# Patient Record
Sex: FEMALE | Race: WHITE | NOT HISPANIC OR LATINO | Employment: OTHER | URBAN - NONMETROPOLITAN AREA
[De-identification: names, ages, dates, MRNs, and addresses within clinical notes are randomized per-mention and may not be internally consistent; named-entity substitution may affect disease eponyms.]

---

## 2017-03-29 ENCOUNTER — AMBULATORY - GICH (OUTPATIENT)
Dept: FAMILY MEDICINE | Facility: OTHER | Age: 72
End: 2017-03-29

## 2017-03-29 ENCOUNTER — AMBULATORY - GICH (OUTPATIENT)
Dept: LAB | Facility: OTHER | Age: 72
End: 2017-03-29

## 2017-03-29 DIAGNOSIS — I48.91 ATRIAL FIBRILLATION (H): ICD-10-CM

## 2017-03-29 LAB
ALT (SGPT) - HISTORICAL: 20 IU/L (ref 7–52)
AST SERPL-CCNC: 20 IU/L (ref 13–39)
TSH - HISTORICAL: 2.32 UIU/ML (ref 0.35–4.94)

## 2017-04-04 ENCOUNTER — AMBULATORY - GICH (OUTPATIENT)
Dept: SCHEDULING | Facility: OTHER | Age: 72
End: 2017-04-04

## 2017-04-05 ENCOUNTER — HISTORIC RESULTS (OUTPATIENT)
Dept: ADMINISTRATIVE | Age: 72
End: 2017-04-05

## 2017-04-24 ENCOUNTER — OFFICE VISIT - GICH (OUTPATIENT)
Dept: FAMILY MEDICINE | Facility: OTHER | Age: 72
End: 2017-04-24

## 2017-04-24 ENCOUNTER — HISTORY (OUTPATIENT)
Dept: FAMILY MEDICINE | Facility: OTHER | Age: 72
End: 2017-04-24

## 2017-04-24 DIAGNOSIS — T14.8XXA OTHER INJURY OF UNSPECIFIED BODY REGION: ICD-10-CM

## 2017-04-24 DIAGNOSIS — I42.8 OTHER CARDIOMYOPATHIES (CODE): ICD-10-CM

## 2017-04-24 DIAGNOSIS — I80.02 PHLEBITIS OF SUPERFICIAL VEIN OF LEFT LOWER EXTREMITY: ICD-10-CM

## 2017-06-27 ENCOUNTER — HISTORIC RESULTS (OUTPATIENT)
Dept: ADMINISTRATIVE | Age: 72
End: 2017-06-27

## 2017-07-21 ENCOUNTER — COMMUNICATION - GICH (OUTPATIENT)
Dept: FAMILY MEDICINE | Facility: OTHER | Age: 72
End: 2017-07-21

## 2017-07-21 DIAGNOSIS — I49.3 VENTRICULAR PREMATURE DEPOLARIZATION: ICD-10-CM

## 2017-08-28 ENCOUNTER — OFFICE VISIT - GICH (OUTPATIENT)
Dept: INTERNAL MEDICINE | Facility: OTHER | Age: 72
End: 2017-08-28

## 2017-08-28 ENCOUNTER — HISTORY (OUTPATIENT)
Dept: INTERNAL MEDICINE | Facility: OTHER | Age: 72
End: 2017-08-28

## 2017-08-28 DIAGNOSIS — M25.562 PAIN IN LEFT KNEE: ICD-10-CM

## 2017-08-28 DIAGNOSIS — N18.30 CHRONIC KIDNEY DISEASE, STAGE III (MODERATE) (H): ICD-10-CM

## 2017-08-28 DIAGNOSIS — G89.29 OTHER CHRONIC PAIN: ICD-10-CM

## 2017-08-28 DIAGNOSIS — I10 ESSENTIAL (PRIMARY) HYPERTENSION: ICD-10-CM

## 2017-08-28 DIAGNOSIS — I49.3 VENTRICULAR PREMATURE DEPOLARIZATION: ICD-10-CM

## 2017-08-28 DIAGNOSIS — Z01.818 ENCOUNTER FOR OTHER PREPROCEDURAL EXAMINATION: ICD-10-CM

## 2017-08-28 DIAGNOSIS — I42.8 OTHER CARDIOMYOPATHIES (CODE): ICD-10-CM

## 2017-08-28 DIAGNOSIS — E78.5 HYPERLIPIDEMIA: ICD-10-CM

## 2017-08-28 LAB
ABSOLUTE BASOPHILS - HISTORICAL: 0 THOU/CU MM
ABSOLUTE EOSINOPHILS - HISTORICAL: 0.2 THOU/CU MM
ABSOLUTE IMMATURE GRANULOCYTES(METAS,MYELOS,PROS) - HISTORICAL: 0 THOU/CU MM
ABSOLUTE LYMPHOCYTES - HISTORICAL: 3.3 THOU/CU MM (ref 0.9–2.9)
ABSOLUTE MONOCYTES - HISTORICAL: 0.6 THOU/CU MM
ABSOLUTE NEUTROPHILS - HISTORICAL: 4.2 THOU/CU MM (ref 1.7–7)
ANION GAP - HISTORICAL: 11 (ref 5–18)
BASOPHILS # BLD AUTO: 0.5 %
BUN SERPL-MCNC: 20 MG/DL (ref 7–25)
BUN/CREAT RATIO - HISTORICAL: 17
CALCIUM SERPL-MCNC: 9.3 MG/DL (ref 8.6–10.3)
CHLORIDE SERPLBLD-SCNC: 103 MMOL/L (ref 98–107)
CO2 SERPL-SCNC: 27 MMOL/L (ref 21–31)
CREAT SERPL-MCNC: 1.18 MG/DL (ref 0.7–1.3)
EOSINOPHIL NFR BLD AUTO: 2.5 %
ERYTHROCYTE [DISTWIDTH] IN BLOOD BY AUTOMATED COUNT: 13.4 % (ref 11.5–15.5)
GFR IF NOT AFRICAN AMERICAN - HISTORICAL: 45 ML/MIN/1.73M2
GLUCOSE SERPL-MCNC: 105 MG/DL (ref 70–105)
HCT VFR BLD AUTO: 38.3 % (ref 33–51)
HEMOGLOBIN: 13.1 G/DL (ref 12–16)
IMMATURE GRANULOCYTES(METAS,MYELOS,PROS) - HISTORICAL: 0.2 %
LYMPHOCYTES NFR BLD AUTO: 38.9 % (ref 20–44)
MCH RBC QN AUTO: 31.4 PG (ref 26–34)
MCHC RBC AUTO-ENTMCNC: 34.2 G/DL (ref 32–36)
MCV RBC AUTO: 92 FL (ref 80–100)
MONOCYTES NFR BLD AUTO: 7.3 %
NEUTROPHILS NFR BLD AUTO: 50.6 % (ref 42–72)
PLATELET # BLD AUTO: 235 THOU/CU MM (ref 140–440)
PMV BLD: 9.9 FL (ref 6.5–11)
POTASSIUM SERPL-SCNC: 3.4 MMOL/L (ref 3.5–5.1)
RED BLOOD COUNT - HISTORICAL: 4.17 MIL/CU MM (ref 4–5.2)
SODIUM SERPL-SCNC: 141 MMOL/L (ref 133–143)
WHITE BLOOD COUNT - HISTORICAL: 8.4 THOU/CU MM (ref 4.5–11)

## 2017-08-28 ASSESSMENT — PATIENT HEALTH QUESTIONNAIRE - PHQ9: SUM OF ALL RESPONSES TO PHQ QUESTIONS 1-9: 0

## 2017-09-27 ENCOUNTER — AMBULATORY - GICH (OUTPATIENT)
Dept: FAMILY MEDICINE | Facility: OTHER | Age: 72
End: 2017-09-27

## 2017-09-27 ENCOUNTER — AMBULATORY - GICH (OUTPATIENT)
Dept: LAB | Facility: OTHER | Age: 72
End: 2017-09-27

## 2017-09-27 DIAGNOSIS — Z79.899 OTHER LONG TERM (CURRENT) DRUG THERAPY: ICD-10-CM

## 2017-09-27 LAB
ALT (SGPT) - HISTORICAL: 20 IU/L (ref 7–52)
AST SERPL-CCNC: 21 IU/L (ref 13–39)
TSH - HISTORICAL: 2.39 UIU/ML (ref 0.35–4.94)

## 2017-10-28 ENCOUNTER — OFFICE VISIT - GICH (OUTPATIENT)
Dept: FAMILY MEDICINE | Facility: OTHER | Age: 72
End: 2017-10-28

## 2017-10-28 ENCOUNTER — HISTORY (OUTPATIENT)
Dept: FAMILY MEDICINE | Facility: OTHER | Age: 72
End: 2017-10-28

## 2017-10-28 DIAGNOSIS — J40 BRONCHITIS: ICD-10-CM

## 2017-12-27 NOTE — PROGRESS NOTES
Patient Information     Patient Name MRN Hailey Reina [id number] Female 1945      Progress Notes by Latasha Ye RN at 2017  9:06 AM     Author:  Latasha Ye RN Service:  (none) Author Type:  NURS - Nurse Clinician     Filed:  2017  9:06 AM Encounter Date:  2017 Status:  Signed     :  Latasha Ye RN (NURS - Nurse Clinician)            Patient was given results via phone, see phone note. Latasha Ye RN ....................  2017   9:06 AM

## 2017-12-27 NOTE — PROGRESS NOTES
Patient Information     Patient Name MRN Sex Hailey Spangler [id number] Female 1945      Progress Notes by Josselin Garrido PA-C at 10/28/2017 10:15 AM     Author:  Josselin Garrido PA-C Service:  (none) Author Type:  PHYS- Physician Assistant     Filed:  10/28/2017  6:20 PM Encounter Date:  10/28/2017 Status:  Signed     :  Josselin Garrido PA-C (PHYS- Physician Assistant)            SUBJECTIVE:    Hailey Shah is a 71 y.o. female who presents for upper respiratory infection.  HPI Comments: Hailey became ill 10 days ago with nasal congestion and coughing.  She has occasionally noticed some fevers in the last couple of days and she seems to be getting worse in the last 2 days.  She is coughing quite often and feels like she has mucus caught in her chest that she cannot get out.  She feels somewhat short of breath.  She also has some maxillary sinus tenderness and down over the ears.            Patient Active Problem List     Diagnosis  Code     RAYNAUD'S DISEASE I73.00     Hyperlipidemia E78.5     LOW BACK PAIN, CHRONIC M54.5     Family history of ischemic heart disease Z82.49     CKD (chronic kidney disease) stage 3, GFR 30-59 ml/min N18.3     Idiopathic cardiomyopathy (HC) I42.8     Arthralgia of right hip M25.551     At risk for amiodarone toxicity with long term use Z79.899     PVC's (premature ventricular contractions) I49.3     Hypertension I10     Hyperlipidemia E78.5   Atrial fibrillation    Current Outpatient Prescriptions on File Prior to Visit       Medication  Sig Dispense Refill     amiodarone 100 mg tablet Take 1 tablet by mouth once daily. 90 tablet 1     amLODIPine (NORVASC) 2.5 mg tablet Take 1 tablet by mouth once daily. 90 tablet 2     ascorbic acid (VITAMIN C) 500 mg tablet Take 1 tablet by mouth once daily.  0     aspirin enteric coated 81 mg tablet Take 1 tablet by mouth once daily with a meal.  0     Calcium-Cholecalciferol, D3, (CALCIUM 500 + D, D3,) 500-125 mg-unit Tab Take 1  "Tab by mouth 2 times daily.  0     furosemide (LASIX) 40 mg tablet Take 1 tablet by mouth every morning. 90 tablet 3     metoprolol succinate (TOPROL XL) 50 mg sustained-release tablet Take 1 tablet by mouth once daily. 90 tablet 4     multivitamin (MVI) tablet Take 1 tablet by mouth once daily.  0     omega-3 fatty acids-vitamin E (FISH OIL) 1,000 mg cap Take 1 capsule by mouth 2 times daily.  0     rosuvastatin (CRESTOR) 40 mg tablet Take 1 tablet by mouth at bedtime. 90 tablet 4     spironolactone (ALDACTONE) 25 mg tablet Take 1/2 tablet by mouth daily. 45 tablet 4     No current facility-administered medications on file prior to visit.      Allergies     Allergen  Reactions     Sulfa (Sulfonamide Antibiotics) *Unknown - Childhood Rxn     Tylenol W Codeine [Acetaminophen-Codeine] Nausea And Vomiting     SH: nonsmoker.     REVIEW OF SYSTEMS:  Review of Systems   Constitutional: Positive for chills, fever and malaise/fatigue.   HENT: Positive for congestion, ear discharge and sinus pain. Negative for sore throat.    Respiratory: Positive for cough.    Cardiovascular:        Some sternal chest pain just with coughing.     Gastrointestinal: Negative for nausea and vomiting.   Musculoskeletal: Negative for back pain, myalgias and neck pain.       OBJECTIVE:  /78  Pulse 80  Temp 100  F (37.8  C) (Tympanic)  Resp 16  Ht 1.626 m (5' 4\")  Wt 87.7 kg (193 lb 6.4 oz)  Breastfeeding? No  BMI 33.2 kg/m2    EXAM:   Physical Exam   Constitutional: She is well-developed, well-nourished, and in no distress.   She appears mildly ill.    HENT:   Right Ear: External ear normal.   Left Ear: External ear normal.   Mouth/Throat: Oropharynx is clear and moist.   Nose congested. Maxillary sinuses tender. TMs intact and gray.   Eyes: Conjunctivae are normal. Pupils are equal, round, and reactive to light. Right eye exhibits no discharge. Left eye exhibits no discharge.   Neck: Normal range of motion. Neck supple. "   Cardiovascular: Normal rate, regular rhythm and normal heart sounds.    Pulmonary/Chest: Effort normal and breath sounds normal. No respiratory distress. She has no wheezes.   She has coarse breath sounds but no julianna wheeze, rales, rhonchi.    Abdominal: Soft. Bowel sounds are normal. There is no tenderness.   Lymphadenopathy:     She has no cervical adenopathy.       ASSESSMENT/PLAN:    ICD-10-CM    1. Bronchitis J40 amoxicillin-clavulanate 875-125 mg tablet (AUGMENTIN)        Plan:   Due to worsening with fever after 10 days of illness, suspect bacterial etiology so will treat with antibiotics. Recommended rest, oral fluids, steam treatments and/or humidified bedroom air.  Follow up if not improving in the next few days, or sooner if higher fevers or other worsening symptoms. She understands and agrees with this plan.     Josselin Garrido PA-C ....................  10/28/2017   6:20 PM

## 2017-12-28 NOTE — PATIENT INSTRUCTIONS
Patient Information     Patient Name Hailey Westfall [id number] Female 1945      Patient Instructions by Josselin Garrido PA-C at 10/28/2017 10:15 AM     Author:  Josselin Garrido PA-C  Service:  (none) Author Type:  PHYS- Physician Assistant     Filed:  10/28/2017 10:40 AM  Encounter Date:  10/28/2017 Status:  Addendum     :  Josselin Garrido PA-C (PHYS- Physician Assistant)        Related Notes: Original Note by Josselin Garrido PA-C (PHYS- Physician Assistant) filed at 10/28/2017 10:40 AM            Hailey,  Because your lung problems are getting worse and you are now running fevers instead of getting better, we are opting to use an antibiotic.  Try drinking warm fluids and placing a  humidifier in your bedroom or just heat up water on the stove and breathe in the steam to loosen mucus.    Return if you get worse or if you are not improving within the next week.            Index Iraqi   Acute Bronchitis: Brief Version   What is acute bronchitis?  When you have acute bronchitis, the airways between your windpipe and your lungs are swollen and irritated. It is also called a chest cold.  What is the cause?  Acute bronchitis is most often caused by a virus, like a cold or the flu. Less often, it can be caused by bacteria.  Most of the time, it clears up in several days, but the cough can take longer to go away. It may take you longer to get better if:    You smoke cigarettes.    You have a heart or lung disease or other health problems.    There s a lot of air pollution or secondhand smoke where you live or work.  What are the symptoms?  You may:    Have a deep cough with yellowish or greenish mucus.    Feel pain in your chest when you breathe deeply or cough.    Wheeze or feel short of breath.    Have a fever or chills.  How can I take care of myself?  Rest at home and drink plenty of fluids to keep the mucus loose. Don t smoke, and stay away from others who are smoking. You should get better in  a several days.  If your symptoms are severe or you have heart or lung disease or diabetes, you may need to see your healthcare provider or take medicine.  How can I help prevent acute bronchitis?  You can lower your chances of getting bronchitis if you wash your hands after using the restroom, coughing, sneezing, or blowing your nose. Also wash your hands before eating or touching your eyes.  Developed by [employer]  Adult Advisor 2016.3 published by [employer]  Last modified: 2016-06-29  Last reviewed: 2016-06-08  This content is reviewed periodically and is subject to change as new health information becomes available. The information is intended to inform and educate and is not a replacement for medical evaluation, advice, diagnosis or treatment by a healthcare professional.  References   Adult Advisor 2016.3 Index    Copyright   2016 [employer] a division of [employer] All rights reserved.                Product 32 Application Directions: Apply to feet nightly

## 2017-12-28 NOTE — TELEPHONE ENCOUNTER
Patient Information     Patient Name MRN Hailey Reina [id number] Female 1945      Telephone Encounter by Fidelia Shultz RN at 2017  9:23 AM     Author:  Fidelia Shultz RN Service:  (none) Author Type:  NURS- Registered Nurse     Filed:  2017  9:24 AM Encounter Date:  2017 Status:  Signed     :  Fidelia Shultz RN (NURS- Registered Nurse)            Pharmacy notified.    Fidelia Shultz RN........2017 9:23 AM

## 2017-12-28 NOTE — TELEPHONE ENCOUNTER
Patient Information     Patient Name MRN Hailey Reina [id number] Female 1945      Telephone Encounter by Lilian Owens RN at 2017  2:32 PM     Author:  Lilian Owens RN Service:  (none) Author Type:  NURS- Registered Nurse     Filed:  2017  2:46 PM Encounter Date:  2017 Status:  Signed     :  Lilian Owens RN (NURS- Registered Nurse)            This is a Refill request from: [facility]  Name of Medication: Amiodarone  Quantity requested: 90  Last fill date: 2017  Due for refill: yes  Last visit with MARLA ERIC was on: 2017 in [facility]  PCP:  Marla Eric MD  Controlled Substance Agreement:  NA   Diagnosis r/t this medication request: PVC's     Unable to complete prescription refill per RN Medication Refill Policy.................... Lilian Owens RN ....................  2017   2:44 PM

## 2017-12-28 NOTE — PROGRESS NOTES
Patient Information     Patient Name MRN Hailey Reina [id number] Female 1945      Progress Notes by Latasha Ye RN at 2017  9:06 AM     Author:  Latasha Ye RN Service:  (none) Author Type:  NURS - Nurse Clinician     Filed:  2017  9:06 AM Encounter Date:  2017 Status:  Signed     :  Latasha Ye RN (NURS - Nurse Clinician)            Patient was given results via phone, see phone note. Latasha Ye RN ....................  2017   9:06 AM

## 2017-12-28 NOTE — TELEPHONE ENCOUNTER
Patient Information     Patient Name MRN Hailey Reina [id number] Female 1945      Telephone Encounter by Ester Eric MD at 2017  9:06 AM     Author:  Ester Eric MD Service:  (none) Author Type:  Physician     Filed:  2017  9:07 AM Encounter Date:  2017 Status:  Signed     :  Ester Eric MD (Physician)            I do not fill her amiodarone. This has been filled by her cardiology group!  Ester Eric MD  9:06 AM 2017

## 2017-12-30 NOTE — NURSING NOTE
Patient Information     Patient Name MRN Hailey Reina [id number] Female 1945      Nursing Note by Audrey Sims at 10/28/2017 10:15 AM     Author:  Audrey Sims Service:  (none) Author Type:  (none)     Filed:  10/28/2017 10:29 AM Encounter Date:  10/28/2017 Status:  Signed     :  Audrey Sims            Patient has been having URI sx for about a week, sinus pressure  Audrey Sims LPN...................10/28/2017   10:24 AM

## 2017-12-30 NOTE — NURSING NOTE
Patient Information     Patient Name MRN Sex Hailey Spangler [id number] Female 1945      Nursing Note by Yanira Maza LPN at 2017  4:00 PM     Author:  Yanira Maza LPN Service:  (none) Author Type:  NURS- Licensed Practical Nurse     Filed:  2017  3:56 PM Encounter Date:  2017 Status:  Signed     :  Yanira Maza LPN (NURS- Licensed Practical Nurse)            This patient presents today for a Preoperative exam for this procedure: left knee arthroscopy and debridement    Date of Surgery: 2017   Surgeon:  Dr. Vo   Facility:  [facility]     Yanira Maza LPN..................2017  3:49 PM

## 2017-12-30 NOTE — NURSING NOTE
Patient Information     Patient Name MRHailey Toledo [id number] Female 1945      Nursing Note by Stephanie Posey RN at 2017  8:30 AM     Author:  Stephanie Posey RN Service:  (none) Author Type:  NURS- Registered Nurse     Filed:  2017 10:23 AM Encounter Date:  2017 Status:  Signed     :  Stephanie Posey RN (NURS- Registered Nurse)            Patient denies SOB and chest pain. Patient stated she is feeling well.    Results faxed to Dr. Granados at [phone]. Fax confirmation received.     Stephanie Posey RN .............. 2017  10:01 AM

## 2018-01-04 NOTE — PATIENT INSTRUCTIONS
Patient Information     Patient Name MRHailey Toledo [id number] Female 1945      Patient Instructions by Ester Eric MD at 2017  3:03 PM     Author:  Ester Eric MD  Service:  (none) Author Type:  Physician     Filed:  2017  3:03 PM  Encounter Date:  2017 Status:  Addendum     :  Ester Eric MD (Physician)        Related Notes: Original Note by Ester Eric MD (Physician) filed at 2017  3:03 PM               Index Hebrew Related topics   Superficial Thrombophlebitis   ________________________________________________________________________  KEY POINTS    Superficial thrombophlebitis is swelling and irritation of a vein near the surface of your body caused by a small blood clot.    You may need anti-inflammatory medicine, warm, moist compresses on the inflamed area, and compression stockings.    Your healthcare provider will tell you what kinds of physical activity are safe for you as you heal.  ________________________________________________________________________  What is superficial thrombophlebitis?  Superficial thrombophlebitis (ST or SVT) is swelling and irritation of a vein near the surface of your body caused by a small blood clot. It is often in the arm or leg.  What is the cause?  A clot may form in a vein because blood flow in the vein slows down or stops. This may happen after:    Injury to the vein, such as a bruise    An IV (medicine or fluid given through a vein)  Often a clot develops in varicose veins, usually in the leg or arm. Varicose veins are enlarged veins close to the surface of the skin.  Things that increase your risk for blood clots also increase your risk for ST. For example:    Blood clotting disorders that you are born with    Pregnancy and delivery    Infections    Certain cancers    Sitting or standing for long periods or continued bed rest    Taking birth control pills or hormone replacement  therapy    Surgery especially orthopedic surgery or surgery in the pelvic area    Varicose veins  What are the symptoms?  Symptoms may include:    A tender cordlike vein that is very sensitive to touch or pressure    Redness, warmth, and swelling in the area around the vein  How is it diagnosed?  Your healthcare provider will ask about your symptoms and medical history and examine you. You may have an ultrasound or X-ray to check for clots in deeper veins, which are more serious than problems with the veins near the surface of your skin. Often, no tests are needed.  How is it treated?  Your healthcare provider may recommend that you:    Take an anti-inflammatory drug, such as aspirin or ibuprofen. Nonsteroidal anti-inflammatory medicines (NSAIDs), such as ibuprofen and aspirin, may cause stomach bleeding and other problems. These risks increase with age. Read the label and take as directed. Unless recommended by your healthcare provider, do not take for more than 10 days.    Put warm, moist compresses on the inflamed area. Be careful to avoid burns. If you are using a heating pad, do not lie on it or fall asleep with it plugged in.    Keep your legs up above your heart as much as possible when you sit or lie down.  Your provider may prescribe a blood thinner medicine, especially if you are at increased risk for developing a blood clot in a deep vein.  Unless you have a lot of pain, your healthcare provider may tell you stay active to avoid the superficial clot becoming a deep clot, which is a much more serious problem.  With proper treatment, ST usually lasts 1 to 2 weeks. In rare cases, the vein may get infected. If this happens, you may need to take antibiotics, or have a procedure to treat or remove the vein.  How can I take care of myself?  Follow the full course of treatment prescribed by your healthcare provider. Ask your provider:    How and when you will get your test results    How long it will take to  recover    If there are activities you should avoid and when you can return to your normal activities    How to take care of yourself at home    What symptoms or problems you should watch for and what to do if you have them  If you have varicose veins, ask your healthcare provider if you should wear special support stockings. Ask what type of stockings you may need, when you should start using them, and how often you should wear them.  Make sure you know when you should come back for a checkup. Keep all appointments for provider visits or tests.  Developed by [employer].  Adult Advisor 2016.3 published by [employer].  Last modified: 2016-07-26  Last reviewed: 2016-07-19  This content is reviewed periodically and is subject to change as new health information becomes available. The information is intended to inform and educate and is not a replacement for medical evaluation, advice, diagnosis or treatment by a healthcare professional.  References   Adult Advisor 2016.3 Index    Copyright   2016 [employer], a division of [employer] All rights reserved.

## 2018-01-04 NOTE — PROGRESS NOTES
"Patient Information     Patient Name MRN Hailey Reina [id number] Female 1945      Progress Notes by Ester Eric MD at 2017  3:00 PM     Author:  Ester Eric MD Service:  (none) Author Type:  Physician     Filed:  2017  3:07 PM Encounter Date:  2017 Status:  Signed     :  Ester Eric MD (Physician)            SUBJECTIVE:   Hailey Shah is a 71 y.o. female who had a lump on left leg that was there at least a month and thinks she hit it on the bottom of her bed     OBJECTIVE:   /82  Pulse 58  Ht 1.63 m (5' 4.17\")  Wt 85.1 kg (187 lb 9.6 oz)  BMI 32.03 kg/m2    Appears well, alert, oriented, pleasant and cooperative.  Skin of left lower leg with many superficial spider type veins.      ASSESSMENT:  1. Hematoma    2. Thrombophlebitis of leg, left, superficial    3. Idiopathic cardiomyopathy (HC)          PLAN:   Reassurance. Continue daily aspirin.  Follow up with cardiology at your discretion.   sEter Eric MD  3:06 PM 2017           "

## 2018-01-04 NOTE — NURSING NOTE
Patient Information     Patient Name MRN Hailey Reina [id number] Female 1945      Nursing Note by Babita Bradley RN at 3/29/2017  8:45 AM     Author:  Babita Bradley RN Service:  (none) Author Type:  NURS- Registered Nurse     Filed:  3/29/2017  8:40 AM Encounter Date:  3/29/2017 Status:  Signed     :  Babita Bradley RN (NURS- Registered Nurse)            Pt here for routine EKG at a year basis now. No symptoms today. Pt is on Amiodarone. Ekg completed  And sent to [facility] to be faxed to Dr Queen today. BABITA BRADLEY RN ....................  3/29/2017   8:39 AM

## 2018-01-16 PROBLEM — M54.50 LUMBAGO: Status: ACTIVE | Noted: 2018-01-16

## 2018-01-16 PROBLEM — Z82.49 FAMILY HISTORY OF ISCHEMIC HEART DISEASE: Status: ACTIVE | Noted: 2018-01-16

## 2018-01-16 PROBLEM — I10 HYPERTENSION: Status: ACTIVE | Noted: 2017-08-28

## 2018-01-16 PROBLEM — E78.5 HYPERLIPIDEMIA: Status: ACTIVE | Noted: 2017-08-28

## 2018-01-16 RX ORDER — DIPHENOXYLATE HYDROCHLORIDE AND ATROPINE SULFATE 2.5; .025 MG/1; MG/1
1 TABLET ORAL
COMMUNITY
Start: 2013-01-16

## 2018-01-16 RX ORDER — ROSUVASTATIN CALCIUM 40 MG/1
40 TABLET, COATED ORAL
COMMUNITY
Start: 2017-04-24 | End: 2018-05-29

## 2018-01-16 RX ORDER — ASPIRIN 81 MG/1
81 TABLET ORAL DAILY
COMMUNITY
Start: 2013-01-16 | End: 2024-01-09

## 2018-01-16 RX ORDER — AMLODIPINE BESYLATE 2.5 MG/1
2.5 TABLET ORAL DAILY
COMMUNITY
Start: 2017-09-26 | End: 2021-04-27

## 2018-01-16 RX ORDER — AMIODARONE HYDROCHLORIDE 100 MG/1
100 TABLET ORAL
COMMUNITY
Start: 2017-10-18 | End: 2020-01-16

## 2018-01-16 RX ORDER — ASCORBIC ACID 500 MG
500 TABLET ORAL DAILY
COMMUNITY
Start: 2013-01-16

## 2018-01-16 RX ORDER — FUROSEMIDE 40 MG
40 TABLET ORAL DAILY
COMMUNITY
Start: 2017-11-01 | End: 2024-01-09

## 2018-01-16 RX ORDER — SPIRONOLACTONE 25 MG/1
TABLET ORAL
COMMUNITY
Start: 2017-04-24 | End: 2018-07-25

## 2018-01-16 RX ORDER — METOPROLOL SUCCINATE 50 MG/1
50 TABLET, EXTENDED RELEASE ORAL
COMMUNITY
Start: 2017-04-24 | End: 2018-05-22

## 2018-01-25 VITALS
SYSTOLIC BLOOD PRESSURE: 112 MMHG | SYSTOLIC BLOOD PRESSURE: 132 MMHG | WEIGHT: 193.4 LBS | TEMPERATURE: 100 F | HEART RATE: 80 BPM | BODY MASS INDEX: 33.02 KG/M2 | WEIGHT: 197.4 LBS | DIASTOLIC BLOOD PRESSURE: 78 MMHG | RESPIRATION RATE: 16 BRPM | DIASTOLIC BLOOD PRESSURE: 74 MMHG | BODY MASS INDEX: 33.7 KG/M2 | HEART RATE: 64 BPM | HEIGHT: 64 IN | HEIGHT: 64 IN | OXYGEN SATURATION: 92 %

## 2018-01-25 VITALS
BODY MASS INDEX: 32.03 KG/M2 | DIASTOLIC BLOOD PRESSURE: 82 MMHG | HEART RATE: 58 BPM | SYSTOLIC BLOOD PRESSURE: 130 MMHG | HEIGHT: 64 IN | WEIGHT: 187.6 LBS

## 2018-01-30 ASSESSMENT — PATIENT HEALTH QUESTIONNAIRE - PHQ9: SUM OF ALL RESPONSES TO PHQ QUESTIONS 1-9: 0

## 2018-03-21 ENCOUNTER — MEDICAL CORRESPONDENCE (OUTPATIENT)
Dept: HEALTH INFORMATION MANAGEMENT | Facility: OTHER | Age: 73
End: 2018-03-21

## 2018-03-21 DIAGNOSIS — Z79.899 HIGH RISK MEDICATION USE: Primary | ICD-10-CM

## 2018-03-21 LAB
ALT SERPL W P-5'-P-CCNC: 17 U/L (ref 7–52)
AST SERPL W P-5'-P-CCNC: 19 U/L (ref 13–39)
TSH SERPL DL<=0.005 MIU/L-ACNC: 1.29 MU/L (ref 0.4–4)

## 2018-03-21 PROCEDURE — 84443 ASSAY THYROID STIM HORMONE: CPT

## 2018-03-21 PROCEDURE — 84450 TRANSFERASE (AST) (SGOT): CPT

## 2018-03-21 PROCEDURE — 84460 ALANINE AMINO (ALT) (SGPT): CPT

## 2018-03-21 PROCEDURE — 36415 COLL VENOUS BLD VENIPUNCTURE: CPT

## 2018-04-24 ENCOUNTER — TRANSFERRED RECORDS (OUTPATIENT)
Dept: HEALTH INFORMATION MANAGEMENT | Facility: OTHER | Age: 73
End: 2018-04-24

## 2018-05-22 DIAGNOSIS — I10 ESSENTIAL HYPERTENSION: Primary | ICD-10-CM

## 2018-05-25 RX ORDER — METOPROLOL SUCCINATE 50 MG/1
TABLET, EXTENDED RELEASE ORAL
Qty: 90 TABLET | Refills: 1 | Status: SHIPPED | OUTPATIENT
Start: 2018-05-25 | End: 2018-11-27

## 2018-05-25 NOTE — TELEPHONE ENCOUNTER
Prescription approved per [facility] Refill Protocol.  Lilian Owens RN.............................5/25/2018 10:25 AM

## 2018-06-15 ENCOUNTER — OFFICE VISIT (OUTPATIENT)
Dept: FAMILY MEDICINE | Facility: OTHER | Age: 73
End: 2018-06-15
Attending: PHYSICIAN ASSISTANT
Payer: COMMERCIAL

## 2018-06-15 VITALS
DIASTOLIC BLOOD PRESSURE: 72 MMHG | WEIGHT: 164 LBS | HEART RATE: 56 BPM | SYSTOLIC BLOOD PRESSURE: 116 MMHG | BODY MASS INDEX: 28.15 KG/M2

## 2018-06-15 DIAGNOSIS — L03.311 CELLULITIS, ABDOMINAL WALL: Primary | ICD-10-CM

## 2018-06-15 DIAGNOSIS — R22.42 LUMP OF SKIN OF LEFT LOWER EXTREMITY: ICD-10-CM

## 2018-06-15 PROCEDURE — 99213 OFFICE O/P EST LOW 20 MIN: CPT | Performed by: PHYSICIAN ASSISTANT

## 2018-06-15 PROCEDURE — G0463 HOSPITAL OUTPT CLINIC VISIT: HCPCS

## 2018-06-15 RX ORDER — DOXYCYCLINE 100 MG/1
100 CAPSULE ORAL 2 TIMES DAILY
Qty: 28 CAPSULE | Refills: 0 | Status: SHIPPED | OUTPATIENT
Start: 2018-06-15 | End: 2018-06-29

## 2018-06-15 NOTE — PATIENT INSTRUCTIONS
Cellulitis - Take the antibiotic as prescribed. Antibiotic has been sent to pharmacy. Please take full course of the antibiotic even if symptoms have completely resolved. This helps prevent against antibiotic resistance.     Watch for any signs of increasing infection (redness, pus, increased pain (may be along the tendon and back of hand if the hand is involved), increased swelling or fever). Call if any of these signs occur. Monitor for fevers or chills.    You may draw a line around the area of redness to monitor the area. Please return to clinic if redness is continuing to spread after 2-3 days.    Call or return to clinic as needed if your symptoms worsen or fail to improve as anticipated.    Encouraged to take ibuprofen for relief up to 4 times per day.    Encouraged to use heat 15 minutes at a time several times per day to decrease pain. Return to clinic with any change or worsening of symptoms.       Tick bites:  Monitor for fevers, chills, fatigue, bulls eye lesion, body aches or pains, joint pain, swelling, stomach concerns, pus, drainage, heart problems such as a slowed heart rate, headache, stiff neck, feeling of pain, weakness or numbness. Return to clinic with change/worsening of symptoms. Gave warning signs/symptoms. Gave tick treatment.    Discussed symptoms of lymes and instructed to be seen and evaluated if they develop. Encouraged prevention with use of mosquito spray with DEET and checking for ticks nightly. Instructed to return if not improving. Patient was agreeable to this plan. All questions were answered.

## 2018-06-15 NOTE — PROGRESS NOTES
Nursing Notes:   Breanna Perez LPN  6/15/2018  9:28 AM  Signed  Patient presents to clinic with but bite on left side of hip/stomach.    Kristina Chris HUSSEIN ...... 6/15/2018 9:21 AM        HPI:    Hailey Shah is a 72 year old female who presents for skin concern. Bite on left side of hip/stomach. Hot flashes in the past week. Getting bigger. Hot, red. Looser stool.  Possible tick bite a week ago.  The red area has been getting larger.  Left lower lateral abdomen.  No history of Lyme's in the past.  No fevers, chest pain, palpitations, problems breathing, stomachaches, nausea, vomiting, constipation, headaches, fatigue, body aches, muscle aches, joint pain or swelling.  No bull's-eye lesion.    Fell a month ago.  Still have a lump on the left medial ankle. Slipped on stairs. Hit cement.  .  No fracture concerns.  The lump is mildly tender.  Able to walk normally.  Was able to walk away from the incident without concerns.  Declines x-rays today.  No foot swelling.  No calf tenderness.    Past Medical History:   Diagnosis Date     Chronic diastolic heart failure (H)     2/24/2014,ECHO 10/30/2013 - Final Impressions:  1. Mild left ventricular enlargement with a mild decrease in systolic function, estimated ejection fraction 45%.  2. Mild right ventricular enlargement with normal systolic function.  3. Mild left atrial enlargement.  4. Mild mitral regurgitation.  5. Trace tricuspid regurgitation.  6. Mild left ventricular diastolic dysfunction.  7. When compared t*     Chronic kidney disease, stage III (moderate)     2/24/2014     Essential (primary) hypertension     No Comments Provided     Hyperlipidemia     No Comments Provided     Low back pain     Chronic low back pain secondary to injury, 1995, with chronic pain syndrome.     Other cardiomyopathies (CODE) (H)     1/29/2013,Diagnosed by angio and cath study 1/2013 at [facility]. EF of 35% at first study     Personal history of other medical  treatment (CODE)      with all vaginal deliveries     Raynaud's syndrome without gangrene     No Comments Provided     Tinnitus     No Comments Provided     Ventricular premature depolarization     3/25/2013     Ventricular tachycardia (H)     3/4/2014       Past Surgical History:   Procedure Laterality Date     ARTHROSCOPY SHOULDER      ,Right shoulder surgery for impingement     ARTHROSCOPY SHOULDER      , Left shoulder surgery for impingement     ARTHROSCOPY SHOULDER      ,and debridement, distal clavical excision     COLONOSCOPY      06,Inflammation noted, no further issues     COLONOSCOPY      2016,wnl no need f/u     FINGER SURGERY      10/89, left fourth finger     HYSTERECTOMY TOTAL ABDOMINAL, BILATERAL SALPINGO-OOPHORECTOMY, COMBINED      ,Soren-Clifton procedure     MAMMOPLASTY AUGMENTATION           OTHER SURGICAL HISTORY      2051,DILATION AND CURETTAGE,D&C for retained placentas x2     OTHER SURGICAL HISTORY      2086,ABLATION,PVC ablation procedure--not successful     REPAIR BLADDER      1999, with [facility] and Juan Miguel procedure with cystoscopy       Family History   Problem Relation Age of Onset     HEART DISEASE Mother      Heart Disease,CHF     Other - See Comments Father      Old age     Breast Cancer Sister      Cancer-breast     HEART DISEASE Brother      Heart Disease,MI     Hypertension Mother      Hypertension     Other - See Comments Mother      rheumatic fever     Blood Disease No family hx of      Blood Disease,no clotting disorders       Social History     Social History     Marital status:      Spouse name: N/A     Number of children: N/A     Years of education: N/A     Occupational History     Not on file.     Social History Main Topics     Smoking status: Never Smoker     Smokeless tobacco: Never Used      Comment: Quit smoking: 3-4 diet coke daily     Alcohol use No     Drug use: Not on file      Comment: Drug use: No     Sexual activity: Not  on file     Other Topics Concern     Not on file     Social History Narrative    , employed at [employer] as a -retired 1/18/2012  Children: Fariba Umana, 1963, [place], [place]                 Shreya Sierra, 1965, [place]                 Domniic Alyssa, 1967, [place]                 Fouzia Robbie, 1968, [place]       Current Outpatient Prescriptions   Medication Sig Dispense Refill     amiodarone (PACERONE/CODARONE) 100 MG TABS tablet Take 100 mg by mouth       amLODIPine (NORVASC) 2.5 MG tablet Take 2.5 mg by mouth       ascorbic acid (VITAMIN C) 500 MG tablet Take 500 mg by mouth       aspirin EC 81 MG EC tablet Take 81 mg by mouth       Calcium Carbonate-Vitamin D (CALCIUM 500 + D) 500-125 MG-UNIT TABS        doxycycline (VIBRAMYCIN) 100 MG capsule Take 1 capsule (100 mg) by mouth 2 times daily for 14 days 28 capsule 0     furosemide (LASIX) 40 MG tablet Take 40 mg by mouth       metoprolol succinate (TOPROL-XL) 50 MG 24 hr tablet TAKE 1 TABLET BY MOUTH ONCE DAILY 90 tablet 1     Multiple Vitamin (MULTI-VITAMINS) TABS Take 1 tablet by mouth       rosuvastatin (CRESTOR) 40 MG tablet TAKE 1 TABLET BY MOUTH AT BEDTIME 90 tablet 3     spironolactone (ALDACTONE) 25 MG tablet Take 1/2 tablet by mouth daily.         Allergies   Allergen Reactions     Acetaminophen-Codeine Nausea and Vomiting     Sulfa Drugs      Other reaction(s): *Unknown - Childhood Rxn       REVIEW OF SYSTEMS:  Refer to HPI.    EXAM:   Vitals:    /72 (BP Location: Right arm, Patient Position: Sitting, Cuff Size: Adult Regular)  Pulse 56  Wt 164 lb (74.4 kg)  BMI 28.15 kg/m2    General Appearance: Pleasant, alert, appropriate appearance for age. No acute distress  Chest/Respiratory Exam: Normal chest wall and respirations. Clear to auscultation.  Cardiovascular Exam: Regular rate and rhythm. S1, S2, no murmur, click, gallop, or rubs.  Skin: Left lower lateral abdomen has an erythematous papule with a bite lucy in the  center.  Papule is approximately 10 x 14 cm in diameter.  Warm to the touch.  No pus or drainage.  No pain with palpation.  Musculoskeletal Exam: Approximate 2 x 2 centimeter mildly tender lump appreciated on the medial side of the left lower leg.  No bruising appreciated.  No pain upon palpation of the tibia or fibula.  No foot swelling or calf tenderness with palpation.  No pain with left ankle flexion and extension.  No edema appreciated.  Foot Exam: Left and right foot: no lesions, nail hygiene good.  Neurologic Exam: Nonfocal, normal gross motor, tone coordination and no tremor.  Psychiatric Exam: Alert and oriented - appropriate affect.    PHQ Depression Screen  PHQ-9 SCORE 12/23/2015 4/26/2016 8/28/2017   Total Score 5 9 0       ASSESSMENT AND PLAN:    1. Cellulitis, abdominal wall    2. Lump of skin of left lower extremity        Abdominal cellulitis -started on doxycycline for cellulitis and possible tickborne illness.  Take the antibiotic as prescribed. Antibiotic has been sent to pharmacy. Please take full course of the antibiotic even if symptoms have completely resolved. This helps prevent against antibiotic resistance.     Watch for any signs of increasing infection (redness, pus, increased pain (may be along the tendon and back of hand if the hand is involved), increased swelling or fever). Call if any of these signs occur. Monitor for fevers or chills.    You may draw a line around the area of redness to monitor the area. Please return to clinic if redness is continuing to spread after 2-3 days.    Call or return to clinic as needed if your symptoms worsen or fail to improve as anticipated.    Encouraged to take ibuprofen for relief up to 4 times per day.    Encouraged to use heat 15 minutes at a time several times per day to decrease pain. Return to clinic with any change or worsening of symptoms.       Tick bites:  Monitor for fevers, chills, fatigue, bulls eye lesion, body aches or pains, joint  pain, swelling, stomach concerns, pus, drainage, heart problems such as a slowed heart rate, headache, stiff neck, feeling of pain, weakness or numbness. Return to clinic with change/worsening of symptoms. Gave warning signs/symptoms. Gave tick treatment.    Discussed symptoms of lymes and instructed to be seen and evaluated if they develop. Encouraged prevention with use of mosquito spray with DEET and checking for ticks nightly. Instructed to return if not improving. Patient was agreeable to this plan. All questions were answered.       Left lower leg lump:  Likely small hematoma that is slowly healing.  Declines x-ray at this time.  Gave warning signs and symptoms.  Encourage warm heat along with elevation several times a day.  No blood clot concerns or fracture concerns at this time.  Return to clinic with change/worsening of symptoms.     Patient Instructions   Cellulitis - Take the antibiotic as prescribed. Antibiotic has been sent to pharmacy. Please take full course of the antibiotic even if symptoms have completely resolved. This helps prevent against antibiotic resistance.     Watch for any signs of increasing infection (redness, pus, increased pain (may be along the tendon and back of hand if the hand is involved), increased swelling or fever). Call if any of these signs occur. Monitor for fevers or chills.    You may draw a line around the area of redness to monitor the area. Please return to clinic if redness is continuing to spread after 2-3 days.    Call or return to clinic as needed if your symptoms worsen or fail to improve as anticipated.    Encouraged to take ibuprofen for relief up to 4 times per day.    Encouraged to use heat 15 minutes at a time several times per day to decrease pain. Return to clinic with any change or worsening of symptoms.       Tick bites:  Monitor for fevers, chills, fatigue, bulls eye lesion, body aches or pains, joint pain, swelling, stomach concerns, pus, drainage,  heart problems such as a slowed heart rate, headache, stiff neck, feeling of pain, weakness or numbness. Return to clinic with change/worsening of symptoms. Gave warning signs/symptoms. Gave tick treatment.    Discussed symptoms of lymes and instructed to be seen and evaluated if they develop. Encouraged prevention with use of mosquito spray with DEET and checking for ticks nightly. Instructed to return if not improving. Patient was agreeable to this plan. All questions were answered.         Raegan Gomez PA-C..................6/15/2018 9:27 AM

## 2018-06-15 NOTE — NURSING NOTE
Patient presents to clinic with but bite on left side of hip/stomach.    Kristina Perez LPN ...... 6/15/2018 9:21 AM

## 2018-06-15 NOTE — MR AVS SNAPSHOT
After Visit Summary   6/15/2018    Hailey Shah    MRN: [id number]           Patient Information     Date Of Birth          1945        Visit Information        Provider Department      6/15/2018 9:15 AM Raegan Gomez PA-C [facility]        Today's Diagnoses     Cellulitis, abdominal wall    -  1      Care Instructions    Cellulitis - Take the antibiotic as prescribed. Antibiotic has been sent to pharmacy. Please take full course of the antibiotic even if symptoms have completely resolved. This helps prevent against antibiotic resistance.     Watch for any signs of increasing infection (redness, pus, increased pain (may be along the tendon and back of hand if the hand is involved), increased swelling or fever). Call if any of these signs occur. Monitor for fevers or chills.    You may draw a line around the area of redness to monitor the area. Please return to clinic if redness is continuing to spread after 2-3 days.    Call or return to clinic as needed if your symptoms worsen or fail to improve as anticipated.    Encouraged to take ibuprofen for relief up to 4 times per day.    Encouraged to use heat 15 minutes at a time several times per day to decrease pain. Return to clinic with any change or worsening of symptoms.       Tick bites:  Monitor for fevers, chills, fatigue, bulls eye lesion, body aches or pains, joint pain, swelling, stomach concerns, pus, drainage, heart problems such as a slowed heart rate, headache, stiff neck, feeling of pain, weakness or numbness. Return to clinic with change/worsening of symptoms. Gave warning signs/symptoms. Gave tick treatment.    Discussed symptoms of lymes and instructed to be seen and evaluated if they develop. Encouraged prevention with use of mosquito spray with DEET and checking for ticks nightly. Instructed to return if not improving. Patient was agreeable to this plan. All questions were answered.              "Follow-ups after your visit        Follow-up notes from your care team     Return if symptoms worsen or fail to improve.      Who to contact     If you have questions or need follow up information about today's clinic visit or your schedule please contact [facility] directly at [phone].  Normal or non-critical lab and imaging results will be communicated to you by MyChart, letter or phone within 4 business days after the clinic has received the results. If you do not hear from us within 7 days, please contact the clinic through MyChart or phone. If you have a critical or abnormal lab result, we will notify you by phone as soon as possible.  Submit refill requests through [employer] or call your pharmacy and they will forward the refill request to us. Please allow 3 business days for your refill to be completed.          Additional Information About Your Visit        MyChart Information     [employer] lets you send messages to your doctor, view your test results, renew your prescriptions, schedule appointments and more. To sign up, go to [email or web address] . Click on \"Log in\" on the left side of the screen, which will take you to the Welcome page. Then click on \"Sign up Now\" on the right side of the page.     You will be asked to enter the access code listed below, as well as some personal information. Please follow the directions to create your username and password.     Your access code is: [email or web address]  Expires: 2018  9:39 AM     Your access code will  in 90 days. If you need help or a new code, please call your [facility] clinic or [phone].        Care EveryWhere ID     This is your Care EveryWhere ID. This could be used by other organizations to access your [facility] medical records  [id number]        Your Vitals Were     Pulse BMI (Body Mass Index)                56 28.15 kg/m2           Blood Pressure from Last 3 Encounters:   06/15/18 116/72   10/28/17 132/78 "   08/28/17 112/74    Weight from Last 3 Encounters:   06/15/18 164 lb (74.4 kg)   10/28/17 193 lb 6.4 oz (87.7 kg)   08/28/17 197 lb 6.4 oz (89.5 kg)              Today, you had the following     No orders found for display         Today's Medication Changes          These changes are accurate as of 6/15/18  9:41 AM.  If you have any questions, ask your nurse or doctor.               Start taking these medicines.        Dose/Directions    doxycycline 100 MG capsule   Commonly known as:  VIBRAMYCIN   Used for:  Cellulitis, abdominal wall   Started by:  Raegan Gomez PA-C        Dose:  100 mg   Take 1 capsule (100 mg) by mouth 2 times daily for 14 days   Quantity:  28 capsule   Refills:  0            Where to get your medicines      These medications were sent to [facility] - [place], [place] - [address]  [address], [facility] [address]     Phone:  [phone]     doxycycline 100 MG capsule                Primary Care Provider Office Phone # Fax #    Ester Melvi Eric [phone]       [address]  [facility] [address]        Equal Access to Services     JAYESH ARNOLD AH: Hadii betty larao Soele, waaxda luqadaha, qaybta kaalmada adeegyada, gareth ge. So [facility] [phone].    ATENCIÓN: Si habla español, tiene a alvarado disposición servicios gratuitos de asistencia lingüística. Llame al [phone].    We comply with applicable federal civil rights laws and [place] laws. We do not discriminate on the basis of race, color, national origin, age, disability, sex, sexual orientation, or gender identity.            Thank you!     Thank you for choosing [facility]  for your care. Our goal is always to provide you with excellent care. Hearing back from our patients is one way we can continue to improve our services. Please take a few minutes to complete the written survey that you may receive in  the mail after your visit with us. Thank you!             Your Updated Medication List - Protect others around you: Learn how to safely use, store and throw away your medicines at www.disposemymeds.org.          This list is accurate as of 6/15/18  9:41 AM.  Always use your most recent med list.                   Brand Name Dispense Instructions for use Diagnosis    amiodarone 100 MG Tabs tablet    PACERONE/CODARONE     Take 100 mg by mouth        amLODIPine 2.5 MG tablet    NORVASC     Take 2.5 mg by mouth        ascorbic acid 500 MG tablet    VITAMIN C     Take 500 mg by mouth        aspirin 81 MG EC tablet      Take 81 mg by mouth        CALCIUM 500 + D 500-125 MG-UNIT Tabs   Generic drug:  Calcium Carbonate-Vitamin D           doxycycline 100 MG capsule    VIBRAMYCIN    28 capsule    Take 1 capsule (100 mg) by mouth 2 times daily for 14 days    Cellulitis, abdominal wall       furosemide 40 MG tablet    LASIX     Take 40 mg by mouth        metoprolol succinate 50 MG 24 hr tablet    TOPROL-XL    90 tablet    TAKE 1 TABLET BY MOUTH ONCE DAILY    Essential hypertension       MULTI-VITAMINS Tabs      Take 1 tablet by mouth        rosuvastatin 40 MG tablet    CRESTOR    90 tablet    TAKE 1 TABLET BY MOUTH AT BEDTIME    Mixed hyperlipidemia       spironolactone 25 MG tablet    ALDACTONE     Take 1/2 tablet by mouth daily.

## 2018-07-24 NOTE — PROGRESS NOTES
Patient Information     Patient Name  Hailey Shah MRN  [id number] Sex  Female   1945      Letter by Case Dooley MD at      Author:  Case Dooley MD Service:  (none) Author Type:  (none)    Filed:   Encounter Date:  2017 Status:  (Other)           Hailey Shah  [address] [facility] [address]          2017    Dear Ms. Shah:    Following are the tests completed during your last clinic visit:    Results for orders placed or performed in visit on 17      BASIC METABOLIC PANEL      Result  Value Ref Range    SODIUM 141 133 - 143 mmol/L    POTASSIUM 3.4 (L) 3.5 - 5.1 mmol/L    CHLORIDE 103 98 - 107 mmol/L    CO2,TOTAL 27 21 - 31 mmol/L    ANION GAP 11 5 - 18                    GLUCOSE 105 70 - 105 mg/dL    CALCIUM 9.3 8.6 - 10.3 mg/dL    BUN 20 7 - 25 mg/dL    CREATININE 1.18 0.70 - 1.30 mg/dL    BUN/CREAT RATIO           17                    GFR if African American 55 (L) >60 ml/min/1.73m2    GFR if not African American 45 (L) >60 ml/min/1.73m2   CBC WITH AUTO DIFFERENTIAL      Result  Value Ref Range    WHITE BLOOD COUNT         8.4 4.5 - 11.0 thou/cu mm    RED BLOOD COUNT           4.17 4.00 - 5.20 mil/cu mm    HEMOGLOBIN                13.1 12.0 - 16.0 g/dL    HEMATOCRIT                38.3 33.0 - 51.0 %    MCV                       92 80 - 100 fL    MCH                       31.4 26.0 - 34.0 pg    MCHC                      34.2 32.0 - 36.0 g/dL    RDW                       13.4 11.5 - 15.5 %    PLATELET COUNT            235 140 - 440 thou/cu mm    MPV                       9.9 6.5 - 11.0 fL    NEUTROPHILS               50.6 42.0 - 72.0 %    LYMPHOCYTES               38.9 20.0 - 44.0 %    MONOCYTES                 7.3 <12.0 %    EOSINOPHILS               2.5 <8.0 %    BASOPHILS                 0.5 <3.0 %    IMMATURE GRANULOCYTES(METAS,MYELOS,PROS) 0.2 %    ABSOLUTE NEUTROPHILS      4.2 1.7 - 7.0 thou/cu mm    ABSOLUTE LYMPHOCYTES      3.3 (H) 0.9 - 2.9 thou/cu mm     ABSOLUTE MONOCYTES        0.6 <0.9 thou/cu mm    ABSOLUTE EOSINOPHILS      0.2 <0.5 thou/cu mm    ABSOLUTE BASOPHILS        0.0 <0.3 thou/cu mm    ABSOLUTE IMMATURE GRANULOCYTES(METAS,MYELOS,PROS) 0.0 <=0.3 thou/cu mm         Your blood tests look acceptable. Congratulations on this report. Should you have any questions about your results, feel free to contact me.    Sincerely,      Case Dooley MD  Internal Medicine  [facility]

## 2018-07-24 NOTE — PROGRESS NOTES
Patient Information     Patient Name  Hailey Shah MRN  [id number] Sex  Female   1945      Letter by Ester Eric MD at      Author:  Ester Eric MD Service:  (none) Author Type:  (none)    Filed:   Encounter Date:  2017 Status:  (Other)           Hailey Shah  [address] [facility] [address]          2017    Dear Ms. Shah:    This letter is to remind you that you are due for your annual exam with Ester Eric MD. Your last comprehensive visit was more than 12 months ago.    A LIMITED refill of amiodarone 100 mg tablet has been called into your pharmacy. Additional refills require you to complete this appointment.    Please call the clinic at [phone] to schedule your appointment.    If you should require additional refills before your scheduled appointment, please contact your pharmacy and we will refill your medication until the date of your appointment.    If you are no longer seeing Ester Eric MD for primary care, please call to let us know. Doing so will remove you from our call/contact list.      Thank you for choosing [facility] for your health care needs.    Sincerely,    Refill RN  [facility]

## 2018-08-17 LAB — EJECTION FRACTION: 58 %

## 2018-09-11 DIAGNOSIS — I42.9 CARDIOMYOPATHY, IDIOPATHIC (H): Primary | ICD-10-CM

## 2018-09-11 DIAGNOSIS — Z79.899 HIGH RISK MEDICATION USE: ICD-10-CM

## 2018-09-11 LAB
ALT SERPL W P-5'-P-CCNC: 18 U/L (ref 7–52)
ANION GAP SERPL CALCULATED.3IONS-SCNC: 15 MMOL/L (ref 3–14)
AST SERPL W P-5'-P-CCNC: 23 U/L (ref 13–39)
BUN SERPL-MCNC: 18 MG/DL (ref 7–25)
CALCIUM SERPL-MCNC: 9.5 MG/DL (ref 8.6–10.3)
CHLORIDE SERPL-SCNC: 103 MMOL/L (ref 98–107)
CO2 SERPL-SCNC: 25 MMOL/L (ref 21–31)
CREAT SERPL-MCNC: 1.14 MG/DL (ref 0.6–1.2)
GFR SERPL CREATININE-BSD FRML MDRD: 47 ML/MIN/1.7M2
GLUCOSE SERPL-MCNC: 91 MG/DL (ref 70–105)
POTASSIUM SERPL-SCNC: 3.7 MMOL/L (ref 3.5–5.1)
SODIUM SERPL-SCNC: 143 MMOL/L (ref 134–144)
TSH SERPL DL<=0.05 MIU/L-ACNC: 3.77 IU/ML (ref 0.34–5.6)

## 2018-09-11 PROCEDURE — 80048 BASIC METABOLIC PNL TOTAL CA: CPT

## 2018-09-11 PROCEDURE — 84443 ASSAY THYROID STIM HORMONE: CPT

## 2018-09-11 PROCEDURE — 84460 ALANINE AMINO (ALT) (SGPT): CPT

## 2018-09-11 PROCEDURE — 36415 COLL VENOUS BLD VENIPUNCTURE: CPT

## 2018-09-11 PROCEDURE — 84450 TRANSFERASE (AST) (SGOT): CPT

## 2019-01-02 DIAGNOSIS — I10 ESSENTIAL HYPERTENSION: ICD-10-CM

## 2019-01-07 RX ORDER — METOPROLOL SUCCINATE 50 MG/1
TABLET, EXTENDED RELEASE ORAL
Qty: 30 TABLET | Refills: 0 | OUTPATIENT
Start: 2019-01-07

## 2019-01-07 NOTE — TELEPHONE ENCOUNTER
[facility] #[id number] sent Rx request for the following:     METOPROLOL SUCC 50MG ER TAB  Sig: TAKE 1 TABLET BY MOUTH EVERY DAY  Last Written Prescription Date:  11/28/18  Last Fill Quantity: 30,   # refills: 0    Last Office Visit: 6/15/18 (Raegan Gomez)  Future Office visit: None.    Per note to pharmacy with last refill, Dr. Whyte stated Pt needed to be seen for further refills. Refill request refused, with note to pharmacy, that Pt needs appointment. Unable to complete prescription refill per RN Medication Refill Policy. Stephanie Posey RN .............. 1/7/2019  10:25 AM

## 2019-03-11 ENCOUNTER — MEDICAL CORRESPONDENCE (OUTPATIENT)
Dept: LAB | Facility: OTHER | Age: 74
End: 2019-03-11

## 2019-03-11 DIAGNOSIS — Z79.899 HIGH RISK MEDICATION USE: ICD-10-CM

## 2019-03-11 LAB
ALT SERPL W P-5'-P-CCNC: 17 U/L (ref 7–52)
AST SERPL W P-5'-P-CCNC: 21 U/L (ref 13–39)
TSH SERPL DL<=0.05 MIU/L-ACNC: 3.52 IU/ML (ref 0.34–5.6)

## 2019-03-11 PROCEDURE — 84450 TRANSFERASE (AST) (SGOT): CPT

## 2019-03-11 PROCEDURE — 36415 COLL VENOUS BLD VENIPUNCTURE: CPT

## 2019-03-11 PROCEDURE — 84460 ALANINE AMINO (ALT) (SGPT): CPT

## 2019-03-11 PROCEDURE — 84443 ASSAY THYROID STIM HORMONE: CPT

## 2019-04-01 DIAGNOSIS — E78.2 MIXED HYPERLIPIDEMIA: ICD-10-CM

## 2019-04-01 NOTE — LETTER
April 5, 2019      Hailey Shah  [address] [facility] [address]        Dear Hailey,     This letter is to remind you that you are over-due for your annual exam with Ester Eric. Your last comprehensive visit was more than 12 months ago.    A LIMITED refill of Rosuvastatin 40 mg tablet has been called into your pharmacy. Additional refills require you to complete this appointment.    Please call the clinic at [phone] to schedule your appointment.    If you should require additional refills before your scheduled appointment, please contact your pharmacy and we will refill your medication until the date of your appointment.    If you are no longer seeing Ester Eric for primary care, please call to let us know. Doing so will remove you from our call/contact list.      Thank you for choosing [facility] for your health care needs.    Sincerely,    Refill RN  [facility]

## 2019-04-04 NOTE — TELEPHONE ENCOUNTER
[facility] #[id number] sent Rx request for the following:    From pharmacy: CAN WE GET A NEW RX FOR 90 DAYS FOR PATIENTS MEDSYNC? THANKS     ROSUVASTATIN 40MG TABLET   Sig: TAKE 1 TABLET BY MOUTH NIGHTLY AT BEDTIME  Last Prescription Date:   5/30/18  Last Fill Qty/Refills:         90, R-3 (at [facility])    Last Office Visit:              6/15/18 (JRO-insect bites)         8/28/17 (Physical)  Future Office visit:           None.    No changes noted to medication at 8/17/18 cardiology appointment.    Statins Protocol Failed4/4 8:25 AM   LDL on file in past 12 months     Patient overdue for annual exam and labs. Unable to reach Patient to assist her in scheduling appointment. Reminder letter sent. Prescription approved per [facility] Refill Protocol for 90 day destinee refill. Stephanie Posey RN .............. 4/5/2019  10:20 AM

## 2019-04-05 ENCOUNTER — TELEPHONE (OUTPATIENT)
Dept: FAMILY MEDICINE | Facility: OTHER | Age: 74
End: 2019-04-05

## 2019-04-05 RX ORDER — ROSUVASTATIN CALCIUM 40 MG/1
TABLET, COATED ORAL
Qty: 90 TABLET | Refills: 0 | Status: SHIPPED | OUTPATIENT
Start: 2019-04-05 | End: 2019-04-25

## 2019-04-05 NOTE — TELEPHONE ENCOUNTER
Contacted pt and relayed message from refill encounter 4/1/2019-refill for rosuvastatin-pt due for appt.    Pt verbalized understanding and states that she has been seeing cardiology [place] and that he may be able to fill medication but would also in agreement with scheduling an appt with PCP. Relayed to pt that rosuvastatin was filled today for limited 90 day supply.  Pt appreciative. Offered to transfer to scheduling.  PT will call back to schedule med management with Dr. Eric after she looks at her schedule.    Betzy Dean RN  ....................  4/5/2019   1:16 PM

## 2019-04-25 ENCOUNTER — OFFICE VISIT (OUTPATIENT)
Dept: FAMILY MEDICINE | Facility: OTHER | Age: 74
End: 2019-04-25
Attending: FAMILY MEDICINE
Payer: COMMERCIAL

## 2019-04-25 ENCOUNTER — HOSPITAL ENCOUNTER (OUTPATIENT)
Dept: GENERAL RADIOLOGY | Facility: OTHER | Age: 74
Discharge: HOME | End: 2019-04-25
Attending: FAMILY MEDICINE | Admitting: FAMILY MEDICINE
Payer: COMMERCIAL

## 2019-04-25 VITALS
WEIGHT: 182.2 LBS | TEMPERATURE: 98.4 F | DIASTOLIC BLOOD PRESSURE: 62 MMHG | HEART RATE: 76 BPM | SYSTOLIC BLOOD PRESSURE: 117 MMHG | BODY MASS INDEX: 31.27 KG/M2 | RESPIRATION RATE: 14 BRPM

## 2019-04-25 DIAGNOSIS — M17.0 PRIMARY OSTEOARTHRITIS OF BOTH KNEES: ICD-10-CM

## 2019-04-25 DIAGNOSIS — M17.0 PRIMARY OSTEOARTHRITIS OF BOTH KNEES: Primary | ICD-10-CM

## 2019-04-25 DIAGNOSIS — E78.2 MIXED HYPERLIPIDEMIA: ICD-10-CM

## 2019-04-25 PROCEDURE — G0463 HOSPITAL OUTPT CLINIC VISIT: HCPCS

## 2019-04-25 PROCEDURE — 99213 OFFICE O/P EST LOW 20 MIN: CPT | Performed by: FAMILY MEDICINE

## 2019-04-25 PROCEDURE — 73560 X-RAY EXAM OF KNEE 1 OR 2: CPT | Mod: LT

## 2019-04-25 PROCEDURE — G0463 HOSPITAL OUTPT CLINIC VISIT: HCPCS | Mod: 25

## 2019-04-25 RX ORDER — ROSUVASTATIN CALCIUM 40 MG/1
40 TABLET, COATED ORAL AT BEDTIME
Qty: 90 TABLET | Refills: 4 | Status: SHIPPED | OUTPATIENT
Start: 2019-04-25 | End: 2020-03-18

## 2019-04-25 ASSESSMENT — PAIN SCALES - GENERAL: PAINLEVEL: NO PAIN (1)

## 2019-04-25 NOTE — NURSING NOTE
"Chief Complaint   Patient presents with     Recheck Medication   Patient presents today for med check and BP check.   Initial /62 (BP Location: Right arm, Patient Position: Sitting, Cuff Size: Adult Regular)   Pulse 76   Temp 98.4  F (36.9  C) (Tympanic)   Resp 14   Wt 82.6 kg (182 lb 3.2 oz)   Breastfeeding? No   BMI 31.27 kg/m   Estimated body mass index is 31.27 kg/m  as calculated from the following:    Height as of 10/28/17: 1.626 m (5' 4\").    Weight as of this encounter: 82.6 kg (182 lb 3.2 oz).  Medication Reconciliation: complete    Ladonna Miguel LPN  "

## 2019-04-25 NOTE — PATIENT INSTRUCTIONS
Patient Education     Osteoarthritis  Osteoarthritis (also called degenerative joint disease) happens when the cartilage in a joint becomes damaged and worn. This may be due to age, wear and tear, overuse of the joint, or other problems. Osteoarthritis can affect any joint. But it is most common in hands, knees, spine, hips, and feet. Symptoms include joint stiffness, pain, and swelling.  Home care    When a joint is more sore than usual, rest it for a day or two.    Heat can help relieve stiffness. Take a hot bath or apply a heating pad for up to 30 minutes at a time. If symptoms are worse in the morning, using heat just after awakening can help relax the muscle and soothe the joints.     Ice helps relieve pain and swelling. It is often used after activity. Use a cold pack wrapped in a thin cloth on the joint for 10 to 15 minutes at a time.     Alternating hot and cold can also help relieve pain. Try this for 20 minutes at a time, several times per day.    Exercise helps prevent the muscles and ligaments around the joint from becoming weak. It also helps maintain function in the joint.  Be as active as you can. Talk to your healthcare provider about what activity program is best for you.    Excess weight puts a lot of extra strain on weight-bearing joints of the lower back, hips, knees, feet and ankles. If you are overweight, talk to your healthcare provider about a safe and effective weight loss program.    Use anti-inflammatory medicines as prescribed for pain. This includes acetaminophen or NSAIDs such as ibuprofen or naproxen. If needed, topical or injected medicines may be recommended. Talk to your healthcare provider if these options are not enough to manage your pain.    Talk with your healthcare provider about devices that might help improve your function and reduce pain.  Follow-up care  Follow up with your healthcare provider as advised by our staff.  When to seek medical advice  Call your healthcare  provider right away if any of these occur:    Redness or swelling of a painful joint    Discharge or pus from a painful joint    Fever of 100.4 F (38 C) or higher, or as directed by your healthcare provider    Worsening joint pain    Decreased ability to move the joint or bear weight on the joint  Date Last Reviewed: 3/1/2017    4570-3452 [employer]. [address] [place] [address]. All rights reserved. This information is not intended as a substitute for professional medical care. Always follow your healthcare professional's instructions.

## 2019-04-25 NOTE — PROGRESS NOTES
"  SUBJECTIVE:   Hailey Shah is a 73 year old female who presents to clinic today for the following health issues:     Patient is here today after receiving a letter stating that she needed to come in for follow-up and refills.  She actually gets most of her medications of cardiology.  Her Crestor was refilled by our RN staff it is usually filled by her provider in [place] and sees cardiology in late summer.  She also has her mammograms done in [place].      Has recently been having more issues with knee pain.  She has had scopes done in the past by Dr. Vo and we do not have those records.  Right knee is more bothersome than the left and sometimes it hurts to get up and weight-bear especially right away in the morning.  As she is up and moving it is a little bit better.  She thinks she will likely have to \"have something done\" in the future.  Suggested we update x-rays and that we could refer her locally since she does not want to travel to [place].    HPI    Patient Active Problem List   Diagnosis     Arthralgia of right hip     At risk for amiodarone toxicity with long term use     CKD (chronic kidney disease) stage 3, GFR 30-59 ml/min (H)     Family history of ischemic heart disease     Hyperlipidemia     Essential hypertension     Idiopathic cardiomyopathy (H)     Lumbago     PVC's (premature ventricular contractions)     Raynaud's disease     Arthropathy of shoulder region     Obesity     Pain in joint, lower leg     Raynaud's phenomenon (by history or observed)     RCT (rotator cuff tear)     Shoulder pain     Past Surgical History:   Procedure Laterality Date     ARTHROSCOPY SHOULDER Right 1989    Shoulder surgery for impingement     ARTHROSCOPY SHOULDER Left 1988     Left shoulder surgery for impingement     ARTHROSCOPY SHOULDER  08/2009    and debridement, distal clavical excision     Cardiac ablation for pvc      not successful     COLONOSCOPY      4/24/06,Inflammation noted, no further issues     " COLONOSCOPY  07/28/2016 7/28/2016,wnl no need f/u     DILATION AND CURETTAGE      D&C for retained placentas x2     FINGER SURGERY Left 10/1989    fourth finger, cyst removal     HYSTERECTOMY TOTAL ABDOMINAL, BILATERAL SALPINGO-OOPHORECTOMY, COMBINED      1991,Soren-Clifton procedure     MAMMOPLASTY AUGMENTATION      1983     REPAIR BLADDER      7/1999, with [facility] and Juan Miguel procedure with cystoscopy       Social History     Tobacco Use     Smoking status: Never Smoker     Smokeless tobacco: Never Used     Tobacco comment: Quit smoking: 3-4 diet coke daily   Substance Use Topics     Alcohol use: No     Alcohol/week: 0.0 oz     Family History   Problem Relation Age of Onset     Heart Disease Mother         Heart Disease,CHF     Hypertension Mother         Hypertension     Other - See Comments Mother         rheumatic fever     Other - See Comments Father         Old age     Breast Cancer Sister         Cancer-breast     Heart Disease Brother         Heart Disease,MI     Blood Disease No family hx of         Blood Disease,no clotting disorders         Current Outpatient Medications   Medication Sig Dispense Refill     amiodarone (PACERONE/CODARONE) 100 MG TABS tablet Take 100 mg by mouth       amLODIPine (NORVASC) 2.5 MG tablet Take 2.5 mg by mouth       ascorbic acid (VITAMIN C) 500 MG tablet Take 500 mg by mouth       aspirin EC 81 MG EC tablet Take 81 mg by mouth       Calcium Carbonate-Vitamin D (CALCIUM 500 + D) 500-125 MG-UNIT TABS        furosemide (LASIX) 40 MG tablet Take 40 mg by mouth       metoprolol succinate (TOPROL-XL) 50 MG 24 hr tablet TAKE 1 TABLET BY MOUTH EVERY DAY 30 tablet 0     Multiple Vitamin (MULTI-VITAMINS) TABS Take 1 tablet by mouth       rosuvastatin (CRESTOR) 40 MG tablet Take 1 tablet (40 mg) by mouth At Bedtime 90 tablet 4     spironolactone (ALDACTONE) 25 MG tablet TAKE 1/2 TABLET BY MOUTH DAILY 45 tablet 4     Allergies   Allergen Reactions     Acetaminophen-Codeine Nausea and  Vomiting     Sulfa Drugs      Other reaction(s): *Unknown - Childhood Rxn     Social History     Social History Narrative    , employed at [employer] as a -retired 1/18/2012      Children: Fariba Umana, 1963, Ravindra, [place], Shreya Sierra, 1965, Tricia Hong, Dominic Shah, 1967, Tricia Hong, Fouzia Avalos, 1968, Miles       Review of Systems     OBJECTIVE:     /62 (BP Location: Right arm, Patient Position: Sitting, Cuff Size: Adult Regular)   Pulse 76   Temp 98.4  F (36.9  C) (Tympanic)   Resp 14   Wt 82.6 kg (182 lb 3.2 oz)   Breastfeeding? No   BMI 31.27 kg/m    Body mass index is 31.27 kg/m .  Physical Exam   Constitutional: She appears well-developed. No distress.   Musculoskeletal:   No effusions of knees.  No significant valgus deformity but does have some widening of the joint spaces.   Nursing note and vitals reviewed.      Diagnostic Test Results: X-ray reading of the knees is pending    ASSESSMENT/PLAN:         ICD-10-CM    1. Primary osteoarthritis of both knees M17.0 XR Knee Standing 2v  Bilateral & 2v Right   2. Mixed hyperlipidemia E78.2 rosuvastatin (CRESTOR) 40 MG tablet     Plan:  Patient will be contacted with reading of her x-rays.  We will then arrange consult if desired to orthopedics.  Follow-up with cardiology annually.  No labs indicated today since her outside labs are reviewed.  Mammogram planned for tomorrow.  Ester Eric MD  [facility]    Portions of this dictation were created using the Dragon [employer] voice recognition system. Proofreading was completed but there may be errors in text.

## 2019-04-26 ENCOUNTER — TRANSFERRED RECORDS (OUTPATIENT)
Dept: HEALTH INFORMATION MANAGEMENT | Facility: OTHER | Age: 74
End: 2019-04-26

## 2019-04-29 DIAGNOSIS — G89.29 CHRONIC PAIN OF RIGHT KNEE: ICD-10-CM

## 2019-04-29 DIAGNOSIS — M17.0 PRIMARY OSTEOARTHRITIS OF BOTH KNEES: Primary | ICD-10-CM

## 2019-04-29 DIAGNOSIS — M25.561 CHRONIC PAIN OF RIGHT KNEE: ICD-10-CM

## 2019-06-10 ENCOUNTER — OFFICE VISIT (OUTPATIENT)
Dept: ORTHOPEDICS | Facility: OTHER | Age: 74
End: 2019-06-10
Attending: FAMILY MEDICINE
Payer: COMMERCIAL

## 2019-06-10 DIAGNOSIS — M17.0 PRIMARY OSTEOARTHRITIS OF BOTH KNEES: ICD-10-CM

## 2019-06-10 DIAGNOSIS — M25.561 CHRONIC PAIN OF RIGHT KNEE: ICD-10-CM

## 2019-06-10 DIAGNOSIS — G89.29 CHRONIC PAIN OF RIGHT KNEE: ICD-10-CM

## 2019-06-10 PROCEDURE — G0463 HOSPITAL OUTPT CLINIC VISIT: HCPCS

## 2019-06-13 NOTE — PROGRESS NOTES
VITALS:   Height:   64  Weight:   186  Pulse rate:  60  Blood Pressure:  112 / 72      CHIEF COMPLAINT: Bilateral Knee Pain, Right Worse than Left    PROBLEMS:   Patient has no noted problems.    PATIENT REPORTED MEDICATIONS:  SPIRONOLACTONE 25 MG ORAL TABLET (SPIRONOLACTONE) ; Route: ORAL  ROSUVASTATIN CALCIUM 40 MG ORAL TABLET (ROSUVASTATIN CALCIUM) ; Route: ORAL  MULTIVITAMIN ADULT ORAL TABLET (MULTIPLE VITAMINS-MINERALS) ; Route: ORAL  METOPROLOL SUCCINATE ER 50 MG ORAL TABLET EXTENDED RELEASE 24 HOUR (METOPROLOL SUCCINATE) ; Route: ORAL  FUROSEMIDE 40 MG ORAL TABLET (FUROSEMIDE) ; Route: ORAL  CALCIUM 500-125 MG-UNIT ORAL TABLET (CALCIUM CARBONATE-VITAMIN D) ; Route: ORAL  ASPIRIN EC 81 MG ORAL TABLET DELAYED RELEASE (ASPIRIN) ; Route: ORAL  ASCORBIC ACID 500 MG ORAL TABLET (ASCORBIC ACID) ; Route: ORAL  AMLODIPINE BESYLATE 2.5 MG ORAL TABLET (AMLODIPINE BESYLATE) ; Route: ORAL  AMIODARONE  MG ORAL TABLET (AMIODARONE HCL) ; Route: ORAL    PATIENT REPORTED ALLERGIES:  * ACETAMINOPHEN-CODEINE (SevereReaction: Nausea and Vomiting)  SULFA (SevereReaction: No reaction details noted)    RISK FACTORS:  Tobacco use:   never smoker  Passive smoke exposure: No  Alcohol Use:   No    HISTORY OF PRESENT ILLNESS:    Hailey Shah is a 73-year-old female with a history of bilateral partial medial meniscectomies in the past.  She has long-standing arthritis going back for years now.  She is having more pain when she is walking and when she is climbing stairs.  It can be sharp and dull all at the same time.  It has been going on for many, many years.  The severity of the pain varies depending upon what she is doing.    A complete review of systems is negative other than the history of present illness and past medical history.     The patient's health history form dated  06/10/2019 was reviewed and signed.  Past medical history, surgical history, social history, family history, and review of systems noted.     PAST MEDICAL  HISTORY:    High Blood Pressure  Heart Trouble  Arthritis    PAST ORTHOPEDIC SURGICAL HISTORY:    Shoulder  Knee    PAST SURGICAL HISTORY:    Fourth Finger Cyst Removal  Total Abdominal Hysterectomy/Bilateral Salpingo-Oophorectomy ([place]) 1991  Mammoplasty Augmentation 1983  Bladder Repair 07/1999, [facility] and Juan Miguel Procedure with Cystoscopy    FAMILY HISTORY:    Mother:  Heart Disease, CHF, Hypertension, Rheumatic Fever  Sister:  Breast Cancer  Brother:  Heart Disease, MI    SOCIAL HISTORY:   Occupation:  Retired  Marital Status:    Alcohol Use:  No  Tobacco Use:  Never  Secondhand Smoke Exposure:  No  History of HIV:  No  History of Hepatitis:  No    PHYSICAL EXAMINATION:    On examination today this is a 73-year-old female in no acute distress.  She is very pleasant on examination.  She has good range of motion of bilateral knees, full flexion and full extension.  She has a little bit of medial opening with valgus stress.  She otherwise has good endpoints with ligamentous testing bilateral knees.  She is tender to palpation over the medial joint line bilaterally.    X-RAY:  X-ray examination taken 23 April 2019 shows primarily medial compartment osteoarthritis of the bilateral knees.  The cartilage height is not completely obliterated as of yet, but she definitely has a varus appearance to the knees with loss of cartilage height in the medial compartment.    ASSESSMENT:    A 73-year-old female with knee osteoarthritis.      PROCEDURES:   Risks, benefits and alternatives of the procedure were reviewed with the patient.  After informed consent was obtained, the patient's right knee was prepped in a sterile fashion with alcohol.  Under sterile technique 40 mg Kenalog and 50 mg of lidocaine were injected in the knee using an anteromedial approach.  The patient tolerated the procedure well.    Risks, benefits and alternatives of the procedure were reviewed with the patient.  After informed consent was  obtained, the patient's left knee was prepped in a sterile fashion with alcohol.  Under sterile technique 40 mg Kenalog and 50 mg of lidocaine were injected in the knee using an anteromedial approach.  The patient tolerated the procedure well.     PLAN:   She does wish to have an injection today.  All the risks and benefits of a cortisone injection to each knee were discussed with her and she wished to proceed.  She did have good relief of her pain.  We will see her back on an as needed basis.    Dictated by Dawson Richardson MD  cc:  MD Dr. Yessenia Gallo at [facility]           D:  06/10/2019  T:  06/12/2019    Typed and/or reviewed and corrected by signing  below, and sent to the Physician for final review and signature.    This report was created using voice recording software and computer-generated templates. Although every effort has been made to review for and eliminate errors, some errors may still occur.         Electronically signed by Obey uKmar -  on 06/13/2019 at 1:17 PM    Electronically signed by Dawson Richardson MD on 06/13/2019 at 1:43 PM

## 2019-09-06 DIAGNOSIS — I49.9 VENTRICULAR ARRHYTHMIA: ICD-10-CM

## 2019-09-06 DIAGNOSIS — I49.3 VENTRICULAR PREMATURE BEATS: Primary | ICD-10-CM

## 2019-09-06 LAB
ALT SERPL W P-5'-P-CCNC: 18 U/L (ref 7–52)
AST SERPL W P-5'-P-CCNC: 20 U/L (ref 13–39)
TSH SERPL DL<=0.05 MIU/L-ACNC: 3.26 IU/ML (ref 0.34–5.6)

## 2019-09-06 PROCEDURE — 36415 COLL VENOUS BLD VENIPUNCTURE: CPT | Mod: ZL | Performed by: INTERNAL MEDICINE

## 2019-09-06 PROCEDURE — 84460 ALANINE AMINO (ALT) (SGPT): CPT | Mod: ZL | Performed by: INTERNAL MEDICINE

## 2019-09-06 PROCEDURE — 84450 TRANSFERASE (AST) (SGOT): CPT | Mod: ZL | Performed by: INTERNAL MEDICINE

## 2019-09-06 PROCEDURE — 84443 ASSAY THYROID STIM HORMONE: CPT | Mod: ZL | Performed by: INTERNAL MEDICINE

## 2019-09-18 DIAGNOSIS — I10 ESSENTIAL HYPERTENSION: ICD-10-CM

## 2019-09-23 RX ORDER — SPIRONOLACTONE 25 MG/1
TABLET ORAL
Qty: 45 TABLET | Refills: 4 | OUTPATIENT
Start: 2019-09-23

## 2019-09-23 NOTE — TELEPHONE ENCOUNTER
"Called and spoke with pharmacist, Suha, regarding refills of cardiology meds. Per LOV note of 4-25-19 with Dr. Eric, most of her meds are \"filled by her provider in [place]...cardiology.\"  Let pharmacy know to send to Dr. Thiago Granados as patient has not established with new provider here yet.   Stephanie Trevizo RN on 9/23/2019 at 9:26 AM    "

## 2019-10-30 ENCOUNTER — ALLIED HEALTH/NURSE VISIT (OUTPATIENT)
Dept: FAMILY MEDICINE | Facility: OTHER | Age: 74
End: 2019-10-30
Payer: COMMERCIAL

## 2019-10-30 DIAGNOSIS — Z23 NEED FOR PROPHYLACTIC VACCINATION AND INOCULATION AGAINST INFLUENZA: Primary | ICD-10-CM

## 2019-10-30 PROCEDURE — 90662 IIV NO PRSV INCREASED AG IM: CPT

## 2019-10-30 PROCEDURE — 90471 IMMUNIZATION ADMIN: CPT

## 2020-01-16 ENCOUNTER — OFFICE VISIT (OUTPATIENT)
Dept: FAMILY MEDICINE | Facility: OTHER | Age: 75
End: 2020-01-16
Attending: FAMILY MEDICINE
Payer: COMMERCIAL

## 2020-01-16 ENCOUNTER — HOSPITAL ENCOUNTER (OUTPATIENT)
Dept: GENERAL RADIOLOGY | Facility: OTHER | Age: 75
Discharge: HOME | End: 2020-01-16
Attending: FAMILY MEDICINE | Admitting: FAMILY MEDICINE
Payer: COMMERCIAL

## 2020-01-16 VITALS
OXYGEN SATURATION: 96 % | WEIGHT: 179 LBS | BODY MASS INDEX: 30.56 KG/M2 | TEMPERATURE: 97.6 F | DIASTOLIC BLOOD PRESSURE: 80 MMHG | HEIGHT: 64 IN | SYSTOLIC BLOOD PRESSURE: 124 MMHG | RESPIRATION RATE: 20 BRPM | HEART RATE: 55 BPM

## 2020-01-16 DIAGNOSIS — S22.31XA CLOSED FRACTURE OF ONE RIB OF RIGHT SIDE, INITIAL ENCOUNTER: ICD-10-CM

## 2020-01-16 DIAGNOSIS — R91.1 NODULE OF RIGHT LUNG: ICD-10-CM

## 2020-01-16 DIAGNOSIS — R07.89 RIGHT-SIDED CHEST WALL PAIN: ICD-10-CM

## 2020-01-16 DIAGNOSIS — R07.89 RIGHT-SIDED CHEST WALL PAIN: Primary | ICD-10-CM

## 2020-01-16 PROCEDURE — 71101 X-RAY EXAM UNILAT RIBS/CHEST: CPT | Mod: RT

## 2020-01-16 PROCEDURE — 99214 OFFICE O/P EST MOD 30 MIN: CPT | Performed by: FAMILY MEDICINE

## 2020-01-16 PROCEDURE — G0463 HOSPITAL OUTPT CLINIC VISIT: HCPCS | Mod: 25

## 2020-01-16 PROCEDURE — G0463 HOSPITAL OUTPT CLINIC VISIT: HCPCS

## 2020-01-16 RX ORDER — AMIODARONE HYDROCHLORIDE 100 MG/1
150 TABLET ORAL DAILY
Qty: 135 TABLET | Refills: 3 | COMMUNITY
Start: 2020-01-16 | End: 2024-01-09

## 2020-01-16 ASSESSMENT — MIFFLIN-ST. JEOR: SCORE: 1291.94

## 2020-01-16 ASSESSMENT — PAIN SCALES - GENERAL: PAINLEVEL: NO PAIN (1)

## 2020-01-16 NOTE — PROGRESS NOTES
"  SUBJECTIVE:   Nursing Notes:   Suha Diehl LPN  1/16/2020  9:02 AM  Sign at exiting of workspace  Chief Complaint   Patient presents with     Pain     side pain , establish care     Patient had been having right side pain and rates at a 1 on pain scale. She wants to discuss what it could have been .   Initial /80 (BP Location: Right arm, Patient Position: Sitting, Cuff Size: Adult Regular)   Pulse 55   Temp 97.6  F (36.4  C) (Tympanic)   Resp 20   Ht 1.626 m (5' 4\")   Wt 81.2 kg (179 lb)   SpO2 96%   BMI 30.73 kg/m    Estimated body mass index is 30.73 kg/m  as calculated from the following:    Height as of this encounter: 1.626 m (5' 4\").    Weight as of this encounter: 81.2 kg (179 lb).  Medication Reconciliation: complete    Suha Diehl LPN    Hailey Shah is a 74 year old female who presents to clinic today for a complaint of of right chest wall pain.  Had a right side pain that started about 6 weeks ago.  It was excruciating to begin with.  Couldn't lay on that side.  Denies any trauma.  Did not have any cough or cold symptoms at that time.  She doesn't recall doing any other exertional activities that would have caused a pain like this.  About 2 weeks ago, she remembered that she had a similar pain from shingles in a similar location.  She did not get any sores this time around.  If she just barely touched the area, it hurt tremendously.  There was no itching.  It has gradually tapered off and is almost gone now.  The pain initially was present with moving, but later was present whether she was moving or not.      Follows with Dr. Granados in [place] for her heart.    HPI    I personally reviewed medications/allergies/history listed below:    Patient Active Problem List    Diagnosis Date Noted     Family history of ischemic heart disease 01/16/2018     Priority: Medium     Overview:   Brother had fatal myocardial infarction at age 60.       Lumbago 01/16/2018     Priority: Medium     " Overview:   Chronic low back pain secondary to injury, 1995, with chronic pain syndrome.       Hyperlipidemia 08/28/2017     Priority: Medium     Overview:   IMO Update 10/11       Essential hypertension 08/28/2017     Priority: Medium     Overview:   IMO Update       PVC's (premature ventricular contractions) 12/14/2016     Priority: Medium     At risk for amiodarone toxicity with long term use 06/09/2016     Priority: Medium     Arthralgia of right hip 04/26/2016     Priority: Medium     Idiopathic cardiomyopathy (H) 09/24/2014     Priority: Medium     CKD (chronic kidney disease) stage 3, GFR 30-59 ml/min (H) 02/24/2014     Priority: Medium     Raynaud's disease 01/25/2012     Priority: Medium     Arthropathy of shoulder region 05/28/2010     Priority: Medium     Overview:   IMO Update 10/11       RCT (rotator cuff tear) 05/28/2010     Priority: Medium     Overview:   IMO Update 10/11       Shoulder pain 04/15/2010     Priority: Medium     Obesity 11/12/2008     Priority: Medium     Overview:   Updated per 10/1/17 IMO import       Pain in joint, lower leg 11/12/2008     Priority: Medium     Overview:   IMO Update 10/11       Raynaud's phenomenon (by history or observed) 11/12/2008     Priority: Medium     Overview:   IMO Update 10/11       Past Medical History:   Diagnosis Date     Chronic diastolic heart failure (H)     2/24/2014,ECHO 10/30/2013 - Final Impressions:  1. Mild left ventricular enlargement with a mild decrease in systolic function, estimated ejection fraction 45%.  2. Mild right ventricular enlargement with normal systolic function.  3. Mild left atrial enlargement.  4. Mild mitral regurgitation.  5. Trace tricuspid regurgitation.  6. Mild left ventricular diastolic dysfunction.  7. When compared t*     Chronic kidney disease, stage III (moderate) (H)     2/24/2014     Essential (primary) hypertension     No Comments Provided     Hyperlipidemia     No Comments Provided     Low back pain     Chronic  low back pain secondary to injury, , with chronic pain syndrome.     Other cardiomyopathies (CODE)     2013,Diagnosed by angio and cath study 2013 at [facility]. EF of 35% at first study     Personal history of other medical treatment (CODE)      with all vaginal deliveries     Raynaud's syndrome without gangrene     No Comments Provided     Tinnitus     No Comments Provided     Ventricular premature depolarization     3/25/2013     Ventricular tachycardia (H)     3/4/2014      Past Surgical History:   Procedure Laterality Date     ARTHROSCOPY SHOULDER Right     Shoulder surgery for impingement     ARTHROSCOPY SHOULDER Left      Left shoulder surgery for impingement     ARTHROSCOPY SHOULDER  2009    and debridement, distal clavical excision     Cardiac ablation for pvc      not successful     COLONOSCOPY      06,Inflammation noted, no further issues     COLONOSCOPY  2016,wnl no need f/u     DILATION AND CURETTAGE      D&C for retained placentas x2     FINGER SURGERY Left 10/1989    fourth finger, cyst removal     HYSTERECTOMY TOTAL ABDOMINAL, BILATERAL SALPINGO-OOPHORECTOMY, COMBINED      ,Soren-Clifton procedure     MAMMOPLASTY AUGMENTATION           REPAIR BLADDER      1999, with [facility] and Juan Miguel procedure with cystoscopy     Family History   Problem Relation Age of Onset     Heart Disease Mother         Heart Disease,CHF     Hypertension Mother         Hypertension     Other - See Comments Mother         rheumatic fever     Other - See Comments Father         Old age     Breast Cancer Sister         Cancer-breast     Heart Disease Brother         Heart Disease,MI     Blood Disease No family hx of         Blood Disease,no clotting disorders     Social History     Tobacco Use     Smoking status: Never Smoker     Smokeless tobacco: Never Used     Tobacco comment: Quit smoking: 3-4 diet coke daily   Substance Use Topics     Alcohol use: No     Alcohol/week: 0.0  "standard drinks     Social History     Social History Narrative    . Gerardo - .  Retired from [employer] as a -retired 1/18/2012.  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    Children: Fariba Dong, 1963, Ravindra, [place], Shreyaemma Esquivel, 1965, Tricia HongDominic, 1967, [place], Fouzia Robbie, 1968, [place]     Current Outpatient Medications   Medication Sig Dispense Refill     amiodarone (PACERONE/CODARONE) 100 MG TABS tablet Take 1.5 tablets (150 mg) by mouth daily Take 1 1/2 tablet daily 135 tablet 3     amLODIPine (NORVASC) 2.5 MG tablet Take 2.5 mg by mouth       ascorbic acid (VITAMIN C) 500 MG tablet Take 500 mg by mouth       aspirin EC 81 MG EC tablet Take 81 mg by mouth       Calcium Carbonate-Vitamin D (CALCIUM 500 + D) 500-125 MG-UNIT TABS        furosemide (LASIX) 40 MG tablet Take 40 mg by mouth       metoprolol succinate (TOPROL-XL) 50 MG 24 hr tablet TAKE 1 TABLET BY MOUTH EVERY DAY 30 tablet 0     Multiple Vitamin (MULTI-VITAMINS) TABS Take 1 tablet by mouth       rosuvastatin (CRESTOR) 40 MG tablet Take 1 tablet (40 mg) by mouth At Bedtime 90 tablet 4     spironolactone (ALDACTONE) 25 MG tablet TAKE 1/2 TABLET BY MOUTH DAILY 45 tablet 4     Allergies   Allergen Reactions     Acetaminophen-Codeine Nausea and Vomiting     Sulfa Drugs      Other reaction(s): *Unknown - Childhood Rxn       Review of Systems   Constitutional: Negative for chills and fever.   Respiratory: Negative for cough, chest tightness and shortness of breath.    Cardiovascular: Positive for chest pain (right lateral thoracic region). Negative for peripheral edema.   Psychiatric/Behavioral: Negative for mood changes. The patient is not nervous/anxious.         OBJECTIVE:     /80 (BP Location: Right arm, Patient Position: Sitting, Cuff Size: Adult Regular)   Pulse 55   Temp 97.6  F (36.4  C) (Tympanic)   Resp 20   Ht 1.626 m (5' 4\")   Wt 81.2 kg (179 lb)   SpO2 96%   BMI 30.73 " kg/m    Body mass index is 30.73 kg/m .  Physical Exam  Constitutional:       Appearance: Normal appearance.   HENT:      Head: Normocephalic and atraumatic.   Eyes:      Extraocular Movements: Extraocular movements intact.      Pupils: Pupils are equal, round, and reactive to light.   Neck:      Musculoskeletal: Normal range of motion and neck supple.   Cardiovascular:      Rate and Rhythm: Normal rate and regular rhythm.      Pulses: Normal pulses.      Heart sounds: No murmur.   Pulmonary:      Effort: Pulmonary effort is normal.      Breath sounds: Normal breath sounds. No wheezing, rhonchi or rales.   Chest:      Chest wall: Tenderness (point tenderness in right lateral lower chest wall. ) present.   Musculoskeletal:      Right lower leg: No edema.      Left lower leg: No edema.   Lymphadenopathy:      Cervical: No cervical adenopathy.   Skin:     Capillary Refill: Capillary refill takes less than 2 seconds.   Neurological:      Mental Status: She is alert.   Psychiatric:         Mood and Affect: Mood normal.         Behavior: Behavior normal.           PHQ-9 SCORE 12/23/2015 4/26/2016 8/28/2017   PHQ-9 Total Score 5 9 0       PHQ-2 Score:     PHQ-2 ( 1999 Pfizer) 4/25/2019 6/15/2018   Q1: Little interest or pleasure in doing things 0 0   Q2: Feeling down, depressed or hopeless 0 0   PHQ-2 Score 0 0         I personally reviewed results withpatient as listed below:   Diagnostic Test Results:  PROCEDURE: XR RIBS & CHEST RT 3VW 1/16/2020 9:52 AM     HISTORY: Right-sided chest wall pain     COMPARISONS: None.     TECHNIQUE: PA view of chest and 3 views of right ribs.     FINDINGS: Heart and pulmonary vasculature are normal. There is some  elongation of the thoracic aorta. No acute infiltrate or effusion is  seen.     There is a nodular density at the right lung base, possibly a nipple  shadow.. Recommend repeat image with nipple markers in place.     There is a subtle fracture through the far lateral right ninth  rib.  There is no pneumothorax.                                                                        IMPRESSION:   1. Subtle right posterolateral ninth rib fracture.  2. Possible nipple shadow right lung base.     SYED OROZCO MD      ASSESSMENT/PLAN:       ICD-10-CM    1. Right-sided chest wall pain R07.89 XR Ribs & Chest Rt 3vw     CT Chest w/o Contrast   2. Closed fracture of one rib of right side, initial encounter S22.31XA CT Chest w/o Contrast   3. Nodule of right lung R91.1 CT Chest w/o Contrast       1.  She does have a 9th rib fracture on the right side, which may explain the pain she has.  The nodule she has that was marked on her chest x-ray in her opinion looks too high for it to be a nipple.  I am concerned that she has a completely unprovoked rib fracture and a possible lung nodule in close proximity.  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  Will evaluate further with CT of her chest.  2.  See #1.  3.  See #1.    Roxana Maki MD  [facility]    Portions of this dictation were created using the Dragon Nuance voice recognition system. Proofreading was completed but there may be errors in text.

## 2020-01-16 NOTE — NURSING NOTE
"Chief Complaint   Patient presents with     Pain     side pain , establish care     Patient had been having right side pain and rates at a 1 on pain scale. She wants to discuss what it could have been .   Initial /80 (BP Location: Right arm, Patient Position: Sitting, Cuff Size: Adult Regular)   Pulse 55   Temp 97.6  F (36.4  C) (Tympanic)   Resp 20   Ht 1.626 m (5' 4\")   Wt 81.2 kg (179 lb)   SpO2 96%   BMI 30.73 kg/m   Estimated body mass index is 30.73 kg/m  as calculated from the following:    Height as of this encounter: 1.626 m (5' 4\").    Weight as of this encounter: 81.2 kg (179 lb).  Medication Reconciliation: complete    Suha Diehl LPN  "

## 2020-01-17 ASSESSMENT — ENCOUNTER SYMPTOMS
CHEST TIGHTNESS: 0
FEVER: 0
NERVOUS/ANXIOUS: 0
CHILLS: 0
SHORTNESS OF BREATH: 0
COUGH: 0

## 2020-01-20 ENCOUNTER — HOSPITAL ENCOUNTER (OUTPATIENT)
Dept: CT IMAGING | Facility: OTHER | Age: 75
Discharge: HOME | End: 2020-01-20
Attending: FAMILY MEDICINE | Admitting: FAMILY MEDICINE
Payer: COMMERCIAL

## 2020-01-20 DIAGNOSIS — S22.31XA CLOSED FRACTURE OF ONE RIB OF RIGHT SIDE, INITIAL ENCOUNTER: ICD-10-CM

## 2020-01-20 DIAGNOSIS — R07.89 RIGHT-SIDED CHEST WALL PAIN: ICD-10-CM

## 2020-01-20 DIAGNOSIS — R91.1 NODULE OF RIGHT LUNG: ICD-10-CM

## 2020-01-20 DIAGNOSIS — R07.89 RIGHT-SIDED CHEST WALL PAIN: Primary | ICD-10-CM

## 2020-01-20 PROCEDURE — 71250 CT THORAX DX C-: CPT

## 2020-01-22 ENCOUNTER — HOSPITAL ENCOUNTER (OUTPATIENT)
Dept: PET IMAGING | Facility: HOSPITAL | Age: 75
Discharge: HOME | End: 2020-01-22
Attending: FAMILY MEDICINE | Admitting: FAMILY MEDICINE
Payer: COMMERCIAL

## 2020-01-22 DIAGNOSIS — R91.1 NODULE OF RIGHT LUNG: ICD-10-CM

## 2020-01-22 DIAGNOSIS — R07.89 RIGHT-SIDED CHEST WALL PAIN: ICD-10-CM

## 2020-01-22 DIAGNOSIS — S22.31XA CLOSED FRACTURE OF ONE RIB OF RIGHT SIDE, INITIAL ENCOUNTER: ICD-10-CM

## 2020-01-22 PROCEDURE — 34300033 ZZH RX 343: Performed by: FAMILY MEDICINE

## 2020-01-22 PROCEDURE — 78815 PET IMAGE W/CT SKULL-THIGH: CPT | Mod: TC,PI

## 2020-01-22 PROCEDURE — A9552 F18 FDG: HCPCS | Performed by: FAMILY MEDICINE

## 2020-01-22 RX ADMIN — FLUDEOXYGLUCOSE F-18 13.55 MCI.: 500 INJECTION, SOLUTION INTRAVENOUS at 10:41

## 2020-01-23 DIAGNOSIS — S22.31XA CLOSED FRACTURE OF ONE RIB OF RIGHT SIDE, INITIAL ENCOUNTER: Primary | ICD-10-CM

## 2020-01-23 DIAGNOSIS — R91.1 NODULE OF RIGHT LUNG: ICD-10-CM

## 2020-02-10 ENCOUNTER — TELEPHONE (OUTPATIENT)
Dept: FAMILY MEDICINE | Facility: OTHER | Age: 75
End: 2020-02-10

## 2020-02-25 ENCOUNTER — TRANSFERRED RECORDS (OUTPATIENT)
Dept: HEALTH INFORMATION MANAGEMENT | Facility: OTHER | Age: 75
End: 2020-02-25

## 2020-02-25 DIAGNOSIS — R91.8 PULMONARY NODULES: Primary | ICD-10-CM

## 2020-02-27 DIAGNOSIS — R91.8 PULMONARY NODULES: ICD-10-CM

## 2020-02-27 LAB
ERYTHROCYTE [SEDIMENTATION RATE] IN BLOOD BY WESTERGREN METHOD: 14 MM/H (ref 1–15)
RHEUMATOID FACT SER NEPH-ACNC: <14 IU/ML (ref 0–20)

## 2020-02-27 PROCEDURE — 85652 RBC SED RATE AUTOMATED: CPT | Mod: ZL | Performed by: INTERNAL MEDICINE

## 2020-02-27 PROCEDURE — 86612 BLASTOMYCES ANTIBODY: CPT | Mod: ZL | Performed by: INTERNAL MEDICINE

## 2020-02-27 PROCEDURE — 86200 CCP ANTIBODY: CPT | Mod: ZL | Performed by: INTERNAL MEDICINE

## 2020-02-27 PROCEDURE — 36415 COLL VENOUS BLD VENIPUNCTURE: CPT | Mod: ZL | Performed by: INTERNAL MEDICINE

## 2020-02-27 PROCEDURE — 86431 RHEUMATOID FACTOR QUANT: CPT | Mod: ZL | Performed by: INTERNAL MEDICINE

## 2020-02-27 PROCEDURE — 86038 ANTINUCLEAR ANTIBODIES: CPT | Mod: ZL | Performed by: INTERNAL MEDICINE

## 2020-02-27 PROCEDURE — 87449 NOS EACH ORGANISM AG IA: CPT | Mod: ZL | Performed by: INTERNAL MEDICINE

## 2020-02-27 PROCEDURE — 86255 FLUORESCENT ANTIBODY SCREEN: CPT | Mod: ZL | Performed by: INTERNAL MEDICINE

## 2020-02-27 PROCEDURE — 86635 COCCIDIOIDES ANTIBODY: CPT | Mod: ZL | Performed by: INTERNAL MEDICINE

## 2020-02-27 PROCEDURE — 87385 HISTOPLASMA CAPSUL AG IA: CPT | Mod: ZL | Performed by: INTERNAL MEDICINE

## 2020-02-28 LAB
ANA SER QL IF: NEGATIVE
ANCA AB PATTERN SER IF-IMP: NORMAL
C-ANCA TITR SER IF: NORMAL {TITER}
CCP AB SER IA-ACNC: 1 U/ML

## 2020-03-02 LAB
B DERMAT AB SER QL ID: NORMAL
COCCIDIOIDES AB SPEC QL ID: NORMAL
LAB SCANNED RESULT: NORMAL
LAB SCANNED RESULT: NORMAL

## 2020-03-03 ENCOUNTER — TELEPHONE (OUTPATIENT)
Dept: FAMILY MEDICINE | Facility: OTHER | Age: 75
End: 2020-03-03

## 2020-03-03 DIAGNOSIS — Z79.899 HIGH RISK MEDICATION USE: Primary | ICD-10-CM

## 2020-03-03 DIAGNOSIS — Z78.0 POSTMENOPAUSE: Primary | ICD-10-CM

## 2020-03-03 LAB — LAB SCANNED RESULT: NORMAL

## 2020-03-03 NOTE — TELEPHONE ENCOUNTER
Wondering if [facility] would order a bone density test. States she spoke with patient and patient is willing to have one done.

## 2020-03-03 NOTE — TELEPHONE ENCOUNTER
Attempted to call Stephanie at [facility].  Jim Velazquez LPN,..............3/3/2020 1:15 PM

## 2020-03-05 NOTE — TELEPHONE ENCOUNTER
Attempted to reach Stephanie at [facility]. Got secure VM so Left message letting her know the Dexa was ordered for the patient.  Jim Velazquez LPN,..............3/5/2020 2:30 PM

## 2020-03-06 DIAGNOSIS — Z79.899 HIGH RISK MEDICATION USE: ICD-10-CM

## 2020-03-06 DIAGNOSIS — R91.8 PULMONARY NODULES: ICD-10-CM

## 2020-03-06 LAB
ALT SERPL W P-5'-P-CCNC: 14 U/L (ref 7–52)
AST SERPL W P-5'-P-CCNC: 19 U/L (ref 13–39)
TSH SERPL DL<=0.05 MIU/L-ACNC: 4.35 IU/ML (ref 0.34–5.6)

## 2020-03-06 PROCEDURE — 36415 COLL VENOUS BLD VENIPUNCTURE: CPT | Mod: ZL | Performed by: INTERNAL MEDICINE

## 2020-03-06 PROCEDURE — 84443 ASSAY THYROID STIM HORMONE: CPT | Mod: ZL | Performed by: INTERNAL MEDICINE

## 2020-03-06 PROCEDURE — 84450 TRANSFERASE (AST) (SGOT): CPT | Mod: ZL | Performed by: INTERNAL MEDICINE

## 2020-03-06 PROCEDURE — 84460 ALANINE AMINO (ALT) (SGPT): CPT | Mod: ZL | Performed by: INTERNAL MEDICINE

## 2020-03-24 ENCOUNTER — VIRTUAL VISIT (OUTPATIENT)
Dept: FAMILY MEDICINE | Facility: OTHER | Age: 75
End: 2020-03-24
Attending: FAMILY MEDICINE
Payer: COMMERCIAL

## 2020-03-24 VITALS
WEIGHT: 178 LBS | DIASTOLIC BLOOD PRESSURE: 60 MMHG | BODY MASS INDEX: 30.55 KG/M2 | HEART RATE: 66 BPM | SYSTOLIC BLOOD PRESSURE: 112 MMHG

## 2020-03-24 DIAGNOSIS — R91.1 NODULE OF RIGHT LUNG: ICD-10-CM

## 2020-03-24 DIAGNOSIS — Z78.0 POSTMENOPAUSE: Primary | ICD-10-CM

## 2020-03-24 DIAGNOSIS — S22.31XA CLOSED FRACTURE OF ONE RIB OF RIGHT SIDE, INITIAL ENCOUNTER: ICD-10-CM

## 2020-03-24 PROCEDURE — 99213 OFFICE O/P EST LOW 20 MIN: CPT | Mod: TEL | Performed by: FAMILY MEDICINE

## 2020-03-24 NOTE — PROGRESS NOTES
"Hailey Shah is a 74 year old female who is being evaluated via a billable telephone visit.      The patient has been notified of following:     \"This telephone visit will be conducted via a call between you and your physician/provider. We have found that certain health care needs can be provided without the need for a physical exam.  This service lets us provide the care you need with a short phone conversation.  If a prescription is necessary we can send it directly to your pharmacy.  If lab work is needed we can place an order for that and you can then stop by our lab to have the test done at a later time.    If during the course of the call the physician/provider feels a telephone visit is not appropriate, you will not be charged for this service.\"     Hailey Shah complains of    Chief Complaint   Patient presents with     Follow Up     Rib Fracture       I have reviewed and updated the patient's Past Medical History, Social History, Family History and Medication List.    ALLERGIES  Acetaminophen-codeine and Sulfa drugs      Additional provider notes:     Hailey is following up with me today regarding her right sided rib fracture and the ensuing follow up.  She had a nodule in her right lung on the chest x-ray.  She went on to have a CT of her chest, which noted a 1.6 cm right lower lobe nodule.  This prompted a PET CT which showed a 15 x9 mm bilobed subpleural nodule in her right lower lobe.  This was felt to have a benign appearance.  She had had right sided right fractures of her 8th and 9th ribs, which were unprovoked.  Because of the nodule, she had been referred to pulmonology.  She had seen Dr. Louis Grijalva on 2/27/2020.  He had done testing for histoplasma, blastomyces, coccidioides, ANCA, CCP, rheumatoid factor, ESR and NATALIE.  All of this testing was normal.   He felt that her lung lesion looks benign, but recommended that she have another x-ray in 2 months to ensure that it is not enlarging.  She " had been contacted by her insurance to have a DEXA scan scheduled.  She has had one in the past, but it was quite a few years ago, before she had turned 64.  She would like this ordered.      Assessment/Plan:  1. Postmenopause  DEXA scan ordered as noted.  She is post-menopausal and also has had unprovoked rib fractures as noted above.  - DX Hip/Pelvis/Spine; Future    2. Closed fracture of one rib of right side, initial encounter  See #1.    3. Nodule of right lung  As noted above, she will be following up with Dr. Louis Grijalva at the end of May to repeat imaging of her chest to follow up on this nodule.      Phone call duration:  7 minutes    Roxana Maki MD

## 2020-04-24 ENCOUNTER — HOSPITAL ENCOUNTER (OUTPATIENT)
Dept: CT IMAGING | Facility: OTHER | Age: 75
Discharge: HOME | End: 2020-04-24
Attending: INTERNAL MEDICINE | Admitting: INTERNAL MEDICINE
Payer: COMMERCIAL

## 2020-04-24 PROCEDURE — 71250 CT THORAX DX C-: CPT

## 2020-04-28 ENCOUNTER — TRANSFERRED RECORDS (OUTPATIENT)
Dept: HEALTH INFORMATION MANAGEMENT | Facility: OTHER | Age: 75
End: 2020-04-28

## 2020-04-28 DIAGNOSIS — R91.8 PULMONARY NODULES: Primary | ICD-10-CM

## 2020-06-24 ENCOUNTER — TELEPHONE (OUTPATIENT)
Dept: FAMILY MEDICINE | Facility: OTHER | Age: 75
End: 2020-06-24

## 2020-06-24 DIAGNOSIS — E78.2 MIXED HYPERLIPIDEMIA: ICD-10-CM

## 2020-06-24 RX ORDER — ROSUVASTATIN CALCIUM 40 MG/1
40 TABLET, COATED ORAL AT BEDTIME
Qty: 90 TABLET | Refills: 1 | Status: SHIPPED | OUTPATIENT
Start: 2020-06-24 | End: 2020-12-11

## 2020-06-24 NOTE — LETTER
[facility]  [address]  [place] [id number] [phone]      June 24, 2020    Hailey Shah  [address]  [facility] [address]    Dear Hailey Shah,     We recently received a refill request for rosuvastatin (Crestor) 40 mg tablet prescribed by Roxana Maki.      We cannot refill this due to annual labs are needed.    In order to get any additional refills, call our scheduling center at [phone]  to request a visit with your primary care provider for an annual physical and labs.    Thank you,     Refill Nurse

## 2020-06-24 NOTE — TELEPHONE ENCOUNTER
"Requested Prescriptions   Pending Prescriptions Disp Refills     rosuvastatin (CRESTOR) 40 MG tablet 90 tablet 1     Sig: Take 1 tablet (40 mg) by mouth At Bedtime       Statins Protocol Failed - 6/24/2020 10:57 AM        Failed - LDL on file in past 12 months     Recent Labs   Lab Test 04/26/16  1115   LDL 85             Passed - No abnormal creatine kinase in past 12 months     No lab results found.             Passed - Recent (12 mo) or future (30 days) visit within the authorizing provider's specialty     Patient has had an office visit with the authorizing provider or a provider within the authorizing providers department within the previous 12 mos or has a future within next 30 days. See \"Patient Info\" tab in inbasket, or \"Choose Columns\" in Meds & Orders section of the refill encounter.              Passed - Medication is active on med list        Passed - Patient is age 18 or older        Passed - No active pregnancy on record        Passed - No positive pregnancy test in past 12 months         Last Written Prescription Date:  3/18/20  Last Fill Quantity: 90,   # refills: 0  Last Office Visit: 3/24/20 Zoey Laguerre  Future Office visit: none    Routing refill request to provider for review/approval because:  Unable to complete prescription refill per RN Medication Refill Policy.  Letter sent to patient re: need for annual exam along with labs--LDLro.  Brenda J. Goodell, RN on 6/24/2020 at 3:31 PM        "

## 2020-06-30 ENCOUNTER — HOSPITAL ENCOUNTER (OUTPATIENT)
Dept: BONE DENSITY | Facility: OTHER | Age: 75
Discharge: HOME | End: 2020-06-30
Attending: FAMILY MEDICINE | Admitting: FAMILY MEDICINE
Payer: COMMERCIAL

## 2020-06-30 DIAGNOSIS — Z78.0 POSTMENOPAUSE: ICD-10-CM

## 2020-06-30 PROCEDURE — 77080 DXA BONE DENSITY AXIAL: CPT

## 2020-07-22 ENCOUNTER — HOSPITAL ENCOUNTER (OUTPATIENT)
Dept: GENERAL RADIOLOGY | Facility: OTHER | Age: 75
End: 2020-07-22
Attending: PHYSICIAN ASSISTANT
Payer: COMMERCIAL

## 2020-07-22 ENCOUNTER — OFFICE VISIT (OUTPATIENT)
Dept: FAMILY MEDICINE | Facility: OTHER | Age: 75
End: 2020-07-22
Attending: PHYSICIAN ASSISTANT
Payer: COMMERCIAL

## 2020-07-22 VITALS
DIASTOLIC BLOOD PRESSURE: 80 MMHG | OXYGEN SATURATION: 98 % | TEMPERATURE: 98.6 F | WEIGHT: 179 LBS | SYSTOLIC BLOOD PRESSURE: 126 MMHG | HEART RATE: 56 BPM | RESPIRATION RATE: 18 BRPM | BODY MASS INDEX: 30.73 KG/M2

## 2020-07-22 DIAGNOSIS — M54.2 NECK PAIN: Primary | ICD-10-CM

## 2020-07-22 DIAGNOSIS — M25.511 ACUTE PAIN OF RIGHT SHOULDER: ICD-10-CM

## 2020-07-22 PROCEDURE — 25000132 ZZH RX MED GY IP 250 OP 250 PS 637: Performed by: PHYSICIAN ASSISTANT

## 2020-07-22 PROCEDURE — G0463 HOSPITAL OUTPT CLINIC VISIT: HCPCS

## 2020-07-22 PROCEDURE — 73030 X-RAY EXAM OF SHOULDER: CPT | Mod: RT

## 2020-07-22 PROCEDURE — 99213 OFFICE O/P EST LOW 20 MIN: CPT | Performed by: PHYSICIAN ASSISTANT

## 2020-07-22 PROCEDURE — 72040 X-RAY EXAM NECK SPINE 2-3 VW: CPT

## 2020-07-22 RX ORDER — ACETAMINOPHEN 500 MG
1000 TABLET ORAL ONCE
Status: COMPLETED | OUTPATIENT
Start: 2020-07-22 | End: 2020-07-22

## 2020-07-22 RX ADMIN — ACETAMINOPHEN 1000 MG: 500 TABLET, FILM COATED ORAL at 11:36

## 2020-07-22 ASSESSMENT — PATIENT HEALTH QUESTIONNAIRE - PHQ9: 5. POOR APPETITE OR OVEREATING: NOT AT ALL

## 2020-07-22 ASSESSMENT — ANXIETY QUESTIONNAIRES
6. BECOMING EASILY ANNOYED OR IRRITABLE: NOT AT ALL
3. WORRYING TOO MUCH ABOUT DIFFERENT THINGS: NOT AT ALL
2. NOT BEING ABLE TO STOP OR CONTROL WORRYING: NOT AT ALL
GAD7 TOTAL SCORE: 0
IF YOU CHECKED OFF ANY PROBLEMS ON THIS QUESTIONNAIRE, HOW DIFFICULT HAVE THESE PROBLEMS MADE IT FOR YOU TO DO YOUR WORK, TAKE CARE OF THINGS AT HOME, OR GET ALONG WITH OTHER PEOPLE: NOT DIFFICULT AT ALL
5. BEING SO RESTLESS THAT IT IS HARD TO SIT STILL: NOT AT ALL
1. FEELING NERVOUS, ANXIOUS, OR ON EDGE: NOT AT ALL
7. FEELING AFRAID AS IF SOMETHING AWFUL MIGHT HAPPEN: NOT AT ALL

## 2020-07-22 ASSESSMENT — PAIN SCALES - GENERAL: PAINLEVEL: EXTREME PAIN (9)

## 2020-07-22 NOTE — NURSING NOTE
"Coming inn with right shoulder pain that started this morning   Sharp pain with just sitting   Pain that could drop her to her knees with movement  No injury    Chief Complaint   Patient presents with     Shoulder Pain     this morning, getting worse       Initial /80   Pulse 56   Temp 98.6  F (37  C)   Resp 18   Wt 81.2 kg (179 lb)   SpO2 98%   BMI 30.73 kg/m   Estimated body mass index is 30.73 kg/m  as calculated from the following:    Height as of 1/16/20: 1.626 m (5' 4\").    Weight as of this encounter: 81.2 kg (179 lb).  Medication Reconciliation: complete    Hortencia Zamora, LPN  "

## 2020-07-22 NOTE — PATIENT INSTRUCTIONS
Right shoulder and neck pain  XR cervical spine: IMPRESSION: Diffuse degenerative changes of the cervical spine. No  evidence of acute fracture.  XR right shoulder: IMPRESSION: Calcific tendinopathy or bursitis. Osteoarthritis.      Will treat symptomatically for neck strain/shoulder strain likely from washing floor or possibly from sleeping wrong.   Apply ice 10-20 minutes 4-5 times daily, after 2 days can apply heat  Scheduled tylenol  650-1000 mg every 4-6 hours, max 4000 mg/day  OTC topical voltaren to affected areas, take as directed  Can also try topical icyhot, take as directed  Follow up with PCP if symptoms persist or worsen    Patient Education     Neck Pain  There are several possible causes of neck pain when there is no injury:    You can get a minor ligament sprain or muscle strain from a sudden minor neck movement. Sleeping with your neck in an awkward position can also cause this.    Some people respond to emotional stress by tensing the muscles of their neck, shoulders, and upper back. Chronic spasm in these muscles can cause neck pain and sometimes headaches.    Gradual wear and tear of the joints in the spine can cause degenerative arthritis. This can be a source of occasional or chronic neck pain.    The spinal disks may bulge and put pressure on a nearby spinal nerve. This can happen as a natural result of aging or repeated small injuries to the neck. The spinal disks are the cushions between each spinal bone. This causes tingling, pain, or numbness that spreads from the neck to the shoulder, arm, or hand on one side.  Acute neck pain usually gets better in 1 to 2 weeks. Neck pain related to disk disease, arthritis in the spinal joints, or spinal stenosis can become chronic and last for months or years. Spinal stenosis is narrowing of the spinal canal.  X-rays are usually not ordered for the initial evaluation of neck pain. However, X-rays may be done if you had a forceful physical injury, such  as a car accident or fall. If pain continues and doesn t respond to medical treatment, X-rays and other tests may be done at a later time.  Home care    Rest and relax the muscles. Use a comfortable pillow that supports the head. It should also help keep the spine in a neutral position. The position of the head should not be tilted forward or backward. A rolled up towel may help for a custom fit.    Some people find relief with heat. Heat can be applied with either a warm shower or bath or a moist towel heated in the microwave and massage. Others prefer cold packs. You can make an ice pack by filling a plastic bag that seals at the top with ice cubes or crushed ice and then wrapping it with a thin towel. Try both and use the method that feels best for 15 to 20 minutes, several times a day.    Whether using ice or heat, be careful that you do not injure your skin. Never put ice directly on the skin. Always wrap the ice in a towel or other type of cloth.This is very important, especially in people with poor skin sensations.     Try to reduce your stress level. Emotional stress can lead to neck muscle tension and get in the way of or delay the healing process.    You may use over-the-counter pain medicine to control pain, unless another medicine was prescribed. If you have chronic liver or kidney disease or ever had a stomach ulcer or GI bleeding, talk with your healthcare provider before using these medicines.  Follow-up care  Follow up with your healthcare provider if your symptoms do not show signs of improvement after one week. Physical therapy or further tests may be needed.  If X-rays, CT scans, or MRI scans were taken, you will be told of any new findings that may affect your care.  Call 911  Call 911 if you have:    Sudden weakness or numbness in one or both arms    Neck swelling, difficulty or painful swallowing    Difficulty breathing    Chest pain  When to seek medical advice  Call your healthcare provider  right away if any of these occur:    Pain becomes worse or spreads into one or both arm    Increasing headache    Fever of 100.4 F (38 C) or higher, or as directed by your healthcare provider  Date Last Reviewed: 7/1/2016 2000-2019 [employer]. [address] [place] [id number]. All rights reserved. This information is not intended as a substitute for professional medical care. Always follow your healthcare professional's instructions.           Patient Education     Tendonitis  A tendon is the thick fibrous cord that joins muscle to bone and allows joints to move. When a tendon becomes inflamed, it is called tendonitis. This can occur from overuse, injury, or infection. This usually involves the shoulders, forearm, wrist, hands and feet. Symptoms include pain, swelling and tenderness to the touch. Moving the joint increases the pain.  It takes 4 to 6 weeks or more for tendonitis to heal. It is treated by preventing motion of the tendon, occasionally with a splint or brace, and the use of anti-inflammatory medicine.  Home care    Some people find relief with ice packs. These can be crushed or cubed ice in a plastic bag or a bag of frozen vegetables wrapped in a thin towel. Other people get better relief with heat. This can include a hot shower, hot bath, or a moist towel warmed in a microwave. Try each and use the method that feels best, for 15 to 20 minutes several times a day.    Rest the inflamed joint and protect it from movement.    You may use over-the-counter ibuprofen or naproxen to treat pain and inflammation, unless another medicine was prescribed. If you can't take these medicines, acetaminophen may help with the pain, but does not treat inflammation. If you have chronic liver or kidney disease or ever had a stomach ulcer or gastrointestinal bleeding, talk with your doctor before using these medicines.    As your symptoms improve, begin gradual motion at the involved  joint.  Follow-up care  Follow up with your healthcare provider if you are not improving after 5 to 7 days of treatment.  When to seek medical advice  Call your healthcare provider right away if any of these occur:    Redness over the painful area    Increasing pain or swelling at the joint    Fever lasting 24 to 48 hours or chills, or as advised by your healthcare provider  Date Last Reviewed: 5/1/2018 2000-2019 [employer]. [address] [place] [address], [place] [id number]. All rights reserved. This information is not intended as a substitute for professional medical care. Always follow your healthcare professional's instructions.           Patient Education     Osteoarthritis  Osteoarthritis (also called degenerative joint disease) happens when the cartilage in a joint becomes damaged and worn. This may be due to age, wear and tear, overuse of the joint, or other problems. Osteoarthritis can affect any joint. But it is most common in hands, knees, spine, hips, and feet. Symptoms include joint stiffness, pain, and swelling.  Home care    When a joint is more sore than usual, rest it for a day or two.    Heat can help relieve stiffness. Take a hot bath or apply a heating pad for up to 30 minutes at a time. If symptoms are worse in the morning, using heat just after awakening can help relax the muscle and soothe the joints.     Ice helps relieve pain and swelling. It is often used after activity. Use a cold pack wrapped in a thin cloth on the joint for 10 to 15 minutes at a time.     Alternating hot and cold can also help relieve pain. Try this for 20 minutes at a time, several times per day.    Exercise helps prevent the muscles and ligaments around the joint from becoming weak. It also helps maintain function in the joint.  Be as active as you can. Talk to your healthcare provider about what activity program is best for you.    Excess weight puts a lot of extra strain on weight-bearing joints of the lower  back, hips, knees, feet and ankles. If you are overweight, talk to your healthcare provider about a safe and effective weight loss program.    Use anti-inflammatory medicines as prescribed for pain. This includes acetaminophen or NSAIDs such as ibuprofen or naproxen. If needed, topical or injected medicines may be recommended. Talk to your healthcare provider if these options are not enough to manage your pain.    Talk with your healthcare provider about devices that might help improve your function and reduce pain.  Follow-up care  Follow up with your healthcare provider as advised by our staff.  When to seek medical advice  Call your healthcare provider right away if any of these occur:    Redness or swelling of a painful joint    Discharge or pus from a painful joint    Fever of 100.4 F (38 C) or higher, or as directed by your healthcare provider    Worsening joint pain    Decreased ability to move the joint or bear weight on the joint  Date Last Reviewed: 3/1/2017    0758-1584 [employer]. [address] [place] [address], [place] [address]. All rights reserved. This information is not intended as a substitute for professional medical care. Always follow your healthcare professional's instructions.

## 2020-07-22 NOTE — PROGRESS NOTES
HPI:    Hailey Shah is a 74 year old female who presents to clinic today for evaluation of right neck and right shoulder pain  Onset  She was uncomfortable sleeping last night and when she woke up this AM it was worse  Associated symptoms: pain right shoulder. Quality: ache, throb, sharp jab. Frequency: constant ache 4/5 out of 10 to 10/10 sharp jab with arm/shoulder movement.   Mechanism of injury: she washed the floors yesterday. She denies any fall or trauma. No prior episodes. No known history of osteopetrosis. She did have a rib fracture without knowing it once so she wanted to be seen for possible fracture.   Previous surgery for shoulder impingement on right & left    Past Medical History:   Diagnosis Date     Chronic diastolic heart failure (H)     2014,ECHO 10/30/2013 - Final Impressions:  1. Mild left ventricular enlargement with a mild decrease in systolic function, estimated ejection fraction 45%.  2. Mild right ventricular enlargement with normal systolic function.  3. Mild left atrial enlargement.  4. Mild mitral regurgitation.  5. Trace tricuspid regurgitation.  6. Mild left ventricular diastolic dysfunction.  7. When compared t*     Chronic kidney disease, stage III (moderate) (H)     2014     Essential (primary) hypertension     No Comments Provided     Hyperlipidemia     No Comments Provided     Low back pain     Chronic low back pain secondary to injury, , with chronic pain syndrome.     Other cardiomyopathies (CODE)     2013,Diagnosed by angio and cath study 2013 at [facility]. EF of 35% at first study     Personal history of other medical treatment (CODE)      with all vaginal deliveries     Raynaud's syndrome without gangrene     No Comments Provided     Tinnitus     No Comments Provided     Ventricular premature depolarization     3/25/2013     Ventricular tachycardia (H)     3/4/2014       Past Surgical History:   Procedure Laterality Date     ARTHROSCOPY SHOULDER Right  1989    Shoulder surgery for impingement     ARTHROSCOPY SHOULDER Left 1988     Left shoulder surgery for impingement     ARTHROSCOPY SHOULDER  08/2009    and debridement, distal clavical excision     Cardiac ablation for pvc      not successful     COLONOSCOPY      4/24/06,Inflammation noted, no further issues     COLONOSCOPY  07/28/2016 7/28/2016,wnl no need f/u     DILATION AND CURETTAGE      D&C for retained placentas x2     FINGER SURGERY Left 10/1989    fourth finger, cyst removal     HYSTERECTOMY TOTAL ABDOMINAL, BILATERAL SALPINGO-OOPHORECTOMY, COMBINED      1991,Soren-Clifton procedure     MAMMOPLASTY AUGMENTATION      1983     REPAIR BLADDER      7/1999, with [facility] and Juan Miguel procedure with cystoscopy       Current Outpatient Medications   Medication Sig Dispense Refill     amiodarone (PACERONE/CODARONE) 100 MG TABS tablet Take 1.5 tablets (150 mg) by mouth daily Take 1 1/2 tablet daily 135 tablet 3     amLODIPine (NORVASC) 2.5 MG tablet Take 2.5 mg by mouth       ascorbic acid (VITAMIN C) 500 MG tablet Take 500 mg by mouth       aspirin EC 81 MG EC tablet Take 81 mg by mouth       Calcium Carbonate-Vitamin D (CALCIUM 500 + D) 500-125 MG-UNIT TABS        furosemide (LASIX) 40 MG tablet Take 40 mg by mouth       metoprolol succinate (TOPROL-XL) 50 MG 24 hr tablet TAKE 1 TABLET BY MOUTH EVERY DAY 30 tablet 0     Multiple Vitamin (MULTI-VITAMINS) TABS Take 1 tablet by mouth       rosuvastatin (CRESTOR) 40 MG tablet Take 1 tablet (40 mg) by mouth At Bedtime 90 tablet 1     spironolactone (ALDACTONE) 25 MG tablet TAKE 1/2 TABLET BY MOUTH DAILY 45 tablet 4       Allergies   Allergen Reactions     Acetaminophen-Codeine Nausea and Vomiting     Sulfa Drugs      Other reaction(s): *Unknown - Childhood Rxn       ROS:  General: feels well, no fever  HENT: negative  Respiratory: engative  Musculoskeletal: injury   Skin: no rash or tick bites    EXAM:  Vitals:    07/22/20 1056   BP: 126/80   Pulse: 56   Resp: 18    Temp: 98.6  F (37  C)   SpO2: 98%   Weight: 81.2 kg (179 lb)     General appearance: well appearing female in moderate distress/tears with movement of neck and shoulder  Head: normocephalic, atraumatic  Neck: no noted abnormality. TTP cervical and cervical paraspinals on right side. Painful ROM, no radiculopathy  Respiratory: normal respiration  Musculoskeletal: Spine - no noted abnormality. Normal posture and gait. Non tender Thoracic and lumbar spine.   Shoulder: holding right shoulder slightly forward. No other noted abnormality. TTP trapezius, AC joint and biceps groove.   ROM: limited extension due to pain  Dermatological: no rashes or lesions  Psychological: normal affect, alert and pleasant    Results for orders placed or performed in visit on 07/22/20   XR Shoulder Right G/E 3 Views     Status: None    Narrative    PROCEDURE:  XR SHOULDER RT G/E 3 VW    HISTORY: pain AC joint, biceps groove; Acute pain of right shoulder.    COMPARISON:  3/14/2014.    TECHNIQUE:  4 views right shoulder.    FINDINGS:  No fracture or dislocation is identified. Multiple calcific  deposits project over the greater tuberosity, some of which are  chronic. There is moderate degenerative hypertrophy at the  acromioclavicular joint. There is mild glenohumeral osteophytosis.       Impression    IMPRESSION: Calcific tendinopathy or bursitis. Osteoarthritis.      OPAL TRAVIS MD   XR Cervical Spine 2/3 Views     Status: None    Narrative    PROCEDURE: XR CERVICAL SPINE 2/3 VWS    HISTORY: c -spine TTP; Neck pain.    COMPARISON: None.    TECHNIQUE: 3 views of the cervical spine were obtained.    FINDINGS: No acute fracture or subluxation is seen. There is trace  degenerative anterolisthesis of C4 on C5. The cervical lordosis is  otherwise maintained. The odontoid is intact. Diffuse degenerative  changes are present, with mild disc height loss and uncovertebral  hypertrophy most pronounced at C5-6. Diffuse facet hypertrophy  is  present.      Impression    IMPRESSION: Diffuse degenerative changes of the cervical spine. No  evidence of acute fracture.    OPAL TRAVIS MD         ASSESSMENT AND PLAN:     Diagnosis Comments   1. Neck pain  XR Cervical Spine 2/3 Views, acetaminophen (TYLENOL) tablet 1,000 mg    2. Acute pain of right shoulder  XR Shoulder Right G/E 3 Views, acetaminophen (TYLENOL) tablet 1,000 mg      Right shoulder and neck pain on exam  Tylenol 1000 mg and ice pack given in clinic  XR cervical spine: IMPRESSION: Diffuse degenerative changes of the cervical spine. No  evidence of acute fracture.  XR right shoulder: IMPRESSION: Calcific tendinopathy or bursitis. Osteoarthritis.      Will treat symptomatically for neck strain/shoulder strain likely from washing floor or possibly from sleeping wrong.   Apply ice 10-20 minutes 4-5 times daily, after 2 days can apply heat  Scheduled tylenol  650-1000 mg every 4-6 hours, max 4000 mg/day  OTC topical voltaren to affected areas, take as directed  Can also try topical icyhot, take as directed  Follow up with PCP if symptoms persist or worsen  Patient received verbal and written instruction including review of warning signs    Mary García PA-C on 7/22/2020 at 2:01 PM

## 2020-07-23 ASSESSMENT — ANXIETY QUESTIONNAIRES: GAD7 TOTAL SCORE: 0

## 2020-07-27 ENCOUNTER — OFFICE VISIT (OUTPATIENT)
Dept: FAMILY MEDICINE | Facility: OTHER | Age: 75
End: 2020-07-27
Attending: FAMILY MEDICINE
Payer: COMMERCIAL

## 2020-07-27 VITALS
SYSTOLIC BLOOD PRESSURE: 128 MMHG | DIASTOLIC BLOOD PRESSURE: 76 MMHG | WEIGHT: 178.25 LBS | TEMPERATURE: 98.5 F | BODY MASS INDEX: 30.43 KG/M2 | HEIGHT: 64 IN | HEART RATE: 60 BPM | RESPIRATION RATE: 16 BRPM

## 2020-07-27 DIAGNOSIS — M25.511 CHRONIC RIGHT SHOULDER PAIN: ICD-10-CM

## 2020-07-27 DIAGNOSIS — G89.29 CHRONIC RIGHT SHOULDER PAIN: ICD-10-CM

## 2020-07-27 DIAGNOSIS — M54.2 NECK PAIN: Primary | ICD-10-CM

## 2020-07-27 PROCEDURE — 99213 OFFICE O/P EST LOW 20 MIN: CPT | Performed by: FAMILY MEDICINE

## 2020-07-27 PROCEDURE — G0463 HOSPITAL OUTPT CLINIC VISIT: HCPCS

## 2020-07-27 ASSESSMENT — ENCOUNTER SYMPTOMS
ARTHRALGIAS: 1
CHILLS: 0
NECK PAIN: 1
SHORTNESS OF BREATH: 0
FEVER: 0
COUGH: 0

## 2020-07-27 ASSESSMENT — MIFFLIN-ST. JEOR: SCORE: 1288.54

## 2020-07-27 ASSESSMENT — PAIN SCALES - GENERAL: PAINLEVEL: MILD PAIN (3)

## 2020-07-27 NOTE — NURSING NOTE
Patient presents to clinic for follow up with ongoing pain and swelling to neck and shoulders.  Pain rated at 3 and movement makes pain worsen.  Medication Reconciliation: complete    Stephanie Miller LPN

## 2020-08-07 ENCOUNTER — TRANSFERRED RECORDS (OUTPATIENT)
Dept: HEALTH INFORMATION MANAGEMENT | Facility: OTHER | Age: 75
End: 2020-08-07

## 2020-08-10 DIAGNOSIS — I42.9 CARDIOMYOPATHY, IDIOPATHIC (H): Primary | ICD-10-CM

## 2020-08-11 DIAGNOSIS — I42.9 CARDIOMYOPATHY, UNSPECIFIED TYPE (H): Primary | ICD-10-CM

## 2020-08-17 DIAGNOSIS — I42.9 CARDIOMYOPATHY, UNSPECIFIED TYPE (H): ICD-10-CM

## 2020-08-17 LAB
CREAT SERPL-MCNC: 1.89 MG/DL (ref 0.6–1.2)
GFR SERPL CREATININE-BSD FRML MDRD: 26 ML/MIN/{1.73_M2}
POTASSIUM SERPL-SCNC: 3.9 MMOL/L (ref 3.5–5.1)
SODIUM SERPL-SCNC: 137 MMOL/L (ref 134–144)

## 2020-08-17 PROCEDURE — 84132 ASSAY OF SERUM POTASSIUM: CPT | Mod: ZL

## 2020-08-17 PROCEDURE — 84295 ASSAY OF SERUM SODIUM: CPT | Mod: ZL

## 2020-08-17 PROCEDURE — 36415 COLL VENOUS BLD VENIPUNCTURE: CPT | Mod: ZL

## 2020-08-17 PROCEDURE — 82565 ASSAY OF CREATININE: CPT | Mod: ZL

## 2020-08-25 DIAGNOSIS — N18.30 CHRONIC KIDNEY DISEASE, STAGE III (MODERATE) (H): Primary | ICD-10-CM

## 2020-08-27 ENCOUNTER — ALLIED HEALTH/NURSE VISIT (OUTPATIENT)
Dept: FAMILY MEDICINE | Facility: OTHER | Age: 75
End: 2020-08-27
Attending: FAMILY MEDICINE
Payer: COMMERCIAL

## 2020-08-27 VITALS — SYSTOLIC BLOOD PRESSURE: 118 MMHG | DIASTOLIC BLOOD PRESSURE: 60 MMHG

## 2020-08-27 DIAGNOSIS — I10 ESSENTIAL HYPERTENSION: Primary | ICD-10-CM

## 2020-08-27 DIAGNOSIS — I42.9 CARDIOMYOPATHY, IDIOPATHIC (H): ICD-10-CM

## 2020-08-27 LAB
CREAT SERPL-MCNC: 1.16 MG/DL (ref 0.6–1.2)
GFR SERPL CREATININE-BSD FRML MDRD: 46 ML/MIN/{1.73_M2}
POTASSIUM SERPL-SCNC: 3.4 MMOL/L (ref 3.5–5.1)
SODIUM SERPL-SCNC: 141 MMOL/L (ref 134–144)

## 2020-08-27 PROCEDURE — 36415 COLL VENOUS BLD VENIPUNCTURE: CPT | Mod: ZL | Performed by: INTERNAL MEDICINE

## 2020-08-27 PROCEDURE — 84295 ASSAY OF SERUM SODIUM: CPT | Mod: ZL | Performed by: INTERNAL MEDICINE

## 2020-08-27 PROCEDURE — 84132 ASSAY OF SERUM POTASSIUM: CPT | Mod: ZL | Performed by: INTERNAL MEDICINE

## 2020-08-27 PROCEDURE — 82565 ASSAY OF CREATININE: CPT | Mod: ZL | Performed by: INTERNAL MEDICINE

## 2020-08-27 NOTE — NURSING NOTE
Manual by nurse 118/60  Patients own machine 129/60    Kristina Perez LPN ...... 8/27/2020 10:09 AM

## 2020-09-17 DIAGNOSIS — I49.3 VENTRICULAR PREMATURE BEATS: Primary | ICD-10-CM

## 2020-09-17 DIAGNOSIS — Z91.89 PERSONAL HISTORY OF POISONING, PRESENTING HAZARDS TO HEALTH: ICD-10-CM

## 2020-09-17 DIAGNOSIS — Z79.899 ENCOUNTER FOR LONG-TERM (CURRENT) USE OF OTHER MEDICATIONS: ICD-10-CM

## 2020-09-23 ENCOUNTER — ALLIED HEALTH/NURSE VISIT (OUTPATIENT)
Dept: FAMILY MEDICINE | Facility: OTHER | Age: 75
End: 2020-09-23
Attending: INTERNAL MEDICINE
Payer: COMMERCIAL

## 2020-09-23 DIAGNOSIS — I49.9 CARDIAC ARRHYTHMIA, UNSPECIFIED CARDIAC ARRHYTHMIA TYPE: Primary | ICD-10-CM

## 2020-09-23 DIAGNOSIS — I49.3 VENTRICULAR PREMATURE BEATS: Primary | ICD-10-CM

## 2020-09-23 DIAGNOSIS — N18.30 CHRONIC KIDNEY DISEASE, STAGE III (MODERATE) (H): ICD-10-CM

## 2020-09-23 LAB
CREAT SERPL-MCNC: 1.21 MG/DL (ref 0.6–1.2)
GFR SERPL CREATININE-BSD FRML MDRD: 43 ML/MIN/{1.73_M2}

## 2020-09-23 PROCEDURE — 93005 ELECTROCARDIOGRAM TRACING: CPT | Performed by: REGISTERED NURSE

## 2020-09-23 PROCEDURE — 36415 COLL VENOUS BLD VENIPUNCTURE: CPT | Mod: ZL | Performed by: INTERNAL MEDICINE

## 2020-09-23 PROCEDURE — 93010 ELECTROCARDIOGRAM REPORT: CPT | Performed by: INTERNAL MEDICINE

## 2020-09-23 PROCEDURE — 82565 ASSAY OF CREATININE: CPT | Mod: ZL | Performed by: INTERNAL MEDICINE

## 2020-09-23 PROCEDURE — G0463 HOSPITAL OUTPT CLINIC VISIT: HCPCS | Mod: 25 | Performed by: REGISTERED NURSE

## 2020-09-23 NOTE — PROGRESS NOTES
EKG  Verified patient's name and . Patient stated reason for visit today is to have EKG completed. Written order from Dr Thiago Granados at [facility] received via fax. EKG order placed. EKG completed and transferred to [facility] EKG reading pool. Written order sent to HIM be scanned into EHR. Copy of EKG faxed to # [phone] as requested.       Jolly Perdomo RN, BSN on 2020 at 10:55 AM

## 2020-09-24 DIAGNOSIS — I49.3 VENTRICULAR PREMATURE BEATS: ICD-10-CM

## 2020-09-24 DIAGNOSIS — N18.30 CHRONIC KIDNEY DISEASE, STAGE III (MODERATE) (H): ICD-10-CM

## 2020-09-24 LAB
ALT SERPL W P-5'-P-CCNC: 19 U/L (ref 7–52)
AST SERPL W P-5'-P-CCNC: 25 U/L (ref 13–39)
TSH SERPL DL<=0.05 MIU/L-ACNC: 3.09 IU/ML (ref 0.34–5.6)

## 2020-09-24 PROCEDURE — 84450 TRANSFERASE (AST) (SGOT): CPT | Mod: ZL

## 2020-09-24 PROCEDURE — 84460 ALANINE AMINO (ALT) (SGPT): CPT | Mod: ZL

## 2020-09-24 PROCEDURE — 84443 ASSAY THYROID STIM HORMONE: CPT | Mod: ZL

## 2020-09-25 LAB — INTERPRETATION ECG - MUSE: NORMAL

## 2020-10-05 ENCOUNTER — ALLIED HEALTH/NURSE VISIT (OUTPATIENT)
Dept: FAMILY MEDICINE | Facility: OTHER | Age: 75
End: 2020-10-05
Attending: FAMILY MEDICINE
Payer: COMMERCIAL

## 2020-10-05 DIAGNOSIS — Z23 NEED FOR PROPHYLACTIC VACCINATION AND INOCULATION AGAINST INFLUENZA: Primary | ICD-10-CM

## 2020-10-05 PROCEDURE — 90662 IIV NO PRSV INCREASED AG IM: CPT

## 2020-10-05 NOTE — PROGRESS NOTES
Immunization Documentation    Prior to Immunization administration, verified patients identity using patient's name and date of birth. Please see IMMUNIZATIONS  and order for additional information.  Patient / Parent instructed to remain in clinic for 15 minutes and report any adverse reaction to staff immediately.    Was entire vial of medication used? Yes  Vial/Syringe: Doug King LPN  10/5/2020   9:15 AM

## 2020-10-11 ENCOUNTER — APPOINTMENT (OUTPATIENT)
Dept: CT IMAGING | Facility: OTHER | Age: 75
End: 2020-10-11
Attending: EMERGENCY MEDICINE
Payer: COMMERCIAL

## 2020-10-11 ENCOUNTER — HOSPITAL ENCOUNTER (EMERGENCY)
Facility: OTHER | Age: 75
Discharge: HOME | End: 2020-10-11
Attending: EMERGENCY MEDICINE | Admitting: EMERGENCY MEDICINE
Payer: COMMERCIAL

## 2020-10-11 VITALS
HEIGHT: 64 IN | TEMPERATURE: 97.8 F | HEART RATE: 55 BPM | DIASTOLIC BLOOD PRESSURE: 57 MMHG | OXYGEN SATURATION: 97 % | RESPIRATION RATE: 16 BRPM | SYSTOLIC BLOOD PRESSURE: 122 MMHG | WEIGHT: 178 LBS | BODY MASS INDEX: 30.39 KG/M2

## 2020-10-11 DIAGNOSIS — S22.42XA: ICD-10-CM

## 2020-10-11 LAB
ALBUMIN SERPL-MCNC: 4.1 G/DL (ref 3.5–5.7)
ALP SERPL-CCNC: 40 U/L (ref 34–104)
ALT SERPL W P-5'-P-CCNC: 17 U/L (ref 7–52)
ANION GAP SERPL CALCULATED.3IONS-SCNC: 7 MMOL/L (ref 3–14)
AST SERPL W P-5'-P-CCNC: 22 U/L (ref 13–39)
BASOPHILS # BLD AUTO: 0.1 10E9/L (ref 0–0.2)
BASOPHILS NFR BLD AUTO: 0.6 %
BILIRUB SERPL-MCNC: 0.5 MG/DL (ref 0.3–1)
BUN SERPL-MCNC: 17 MG/DL (ref 7–25)
CALCIUM SERPL-MCNC: 8.8 MG/DL (ref 8.6–10.3)
CHLORIDE SERPL-SCNC: 107 MMOL/L (ref 98–107)
CO2 SERPL-SCNC: 29 MMOL/L (ref 21–31)
CREAT SERPL-MCNC: 1.29 MG/DL (ref 0.6–1.2)
DIFFERENTIAL METHOD BLD: ABNORMAL
EOSINOPHIL # BLD AUTO: 0.1 10E9/L (ref 0–0.7)
EOSINOPHIL NFR BLD AUTO: 1.7 %
ERYTHROCYTE [DISTWIDTH] IN BLOOD BY AUTOMATED COUNT: 13.5 % (ref 10–15)
GFR SERPL CREATININE-BSD FRML MDRD: 40 ML/MIN/{1.73_M2}
GLUCOSE SERPL-MCNC: 145 MG/DL (ref 70–105)
HCT VFR BLD AUTO: 34.9 % (ref 35–47)
HGB BLD-MCNC: 11.6 G/DL (ref 11.7–15.7)
IMM GRANULOCYTES # BLD: 0.1 10E9/L (ref 0–0.4)
IMM GRANULOCYTES NFR BLD: 0.8 %
LYMPHOCYTES # BLD AUTO: 2.9 10E9/L (ref 0.8–5.3)
LYMPHOCYTES NFR BLD AUTO: 34.9 %
MCH RBC QN AUTO: 30.9 PG (ref 26.5–33)
MCHC RBC AUTO-ENTMCNC: 33.2 G/DL (ref 31.5–36.5)
MCV RBC AUTO: 93 FL (ref 78–100)
MONOCYTES # BLD AUTO: 0.6 10E9/L (ref 0–1.3)
MONOCYTES NFR BLD AUTO: 7.3 %
NEUTROPHILS # BLD AUTO: 4.6 10E9/L (ref 1.6–8.3)
NEUTROPHILS NFR BLD AUTO: 54.7 %
PLATELET # BLD AUTO: 202 10E9/L (ref 150–450)
POTASSIUM SERPL-SCNC: 3.5 MMOL/L (ref 3.5–5.1)
PROT SERPL-MCNC: 6.6 G/DL (ref 6.4–8.9)
RBC # BLD AUTO: 3.75 10E12/L (ref 3.8–5.2)
SODIUM SERPL-SCNC: 143 MMOL/L (ref 134–144)
WBC # BLD AUTO: 8.4 10E9/L (ref 4–11)

## 2020-10-11 PROCEDURE — 85025 COMPLETE CBC W/AUTO DIFF WBC: CPT | Performed by: EMERGENCY MEDICINE

## 2020-10-11 PROCEDURE — 250N000011 HC RX IP 250 OP 636: Performed by: EMERGENCY MEDICINE

## 2020-10-11 PROCEDURE — 96376 TX/PRO/DX INJ SAME DRUG ADON: CPT | Mod: XU | Performed by: EMERGENCY MEDICINE

## 2020-10-11 PROCEDURE — 250N000013 HC RX MED GY IP 250 OP 250 PS 637: Performed by: EMERGENCY MEDICINE

## 2020-10-11 PROCEDURE — 36415 COLL VENOUS BLD VENIPUNCTURE: CPT | Performed by: EMERGENCY MEDICINE

## 2020-10-11 PROCEDURE — 74177 CT ABD & PELVIS W/CONTRAST: CPT

## 2020-10-11 PROCEDURE — 99283 EMERGENCY DEPT VISIT LOW MDM: CPT | Performed by: EMERGENCY MEDICINE

## 2020-10-11 PROCEDURE — 71260 CT THORAX DX C+: CPT

## 2020-10-11 PROCEDURE — 255N000002 HC RX 255 OP 636: Performed by: EMERGENCY MEDICINE

## 2020-10-11 PROCEDURE — 96374 THER/PROPH/DIAG INJ IV PUSH: CPT | Mod: XU | Performed by: EMERGENCY MEDICINE

## 2020-10-11 PROCEDURE — 80053 COMPREHEN METABOLIC PANEL: CPT | Performed by: EMERGENCY MEDICINE

## 2020-10-11 PROCEDURE — 72125 CT NECK SPINE W/O DYE: CPT

## 2020-10-11 PROCEDURE — 99285 EMERGENCY DEPT VISIT HI MDM: CPT | Mod: 25 | Performed by: EMERGENCY MEDICINE

## 2020-10-11 RX ORDER — IODIXANOL 320 MG/ML
100 INJECTION, SOLUTION INTRAVASCULAR ONCE
Status: COMPLETED | OUTPATIENT
Start: 2020-10-11 | End: 2020-10-11

## 2020-10-11 RX ORDER — FENTANYL CITRATE 50 UG/ML
50 INJECTION, SOLUTION INTRAMUSCULAR; INTRAVENOUS ONCE
Status: COMPLETED | OUTPATIENT
Start: 2020-10-11 | End: 2020-10-11

## 2020-10-11 RX ORDER — HYDROCODONE BITARTRATE AND ACETAMINOPHEN 5; 325 MG/1; MG/1
1-2 TABLET ORAL EVERY 6 HOURS PRN
Qty: 18 TABLET | Refills: 0 | Status: SHIPPED | OUTPATIENT
Start: 2020-10-11 | End: 2020-10-15 | Stop reason: ALTCHOICE

## 2020-10-11 RX ORDER — HYDROCODONE BITARTRATE AND ACETAMINOPHEN 5; 325 MG/1; MG/1
1 TABLET ORAL ONCE
Status: COMPLETED | OUTPATIENT
Start: 2020-10-11 | End: 2020-10-11

## 2020-10-11 RX ADMIN — HYDROCODONE BITARTRATE AND ACETAMINOPHEN 1 TABLET: 5; 325 TABLET ORAL at 16:32

## 2020-10-11 RX ADMIN — FENTANYL CITRATE 50 MCG: 50 INJECTION, SOLUTION INTRAMUSCULAR; INTRAVENOUS at 16:31

## 2020-10-11 RX ADMIN — FENTANYL CITRATE 50 MCG: 50 INJECTION, SOLUTION INTRAMUSCULAR; INTRAVENOUS at 15:11

## 2020-10-11 RX ADMIN — IODIXANOL 100 ML: 320 INJECTION, SOLUTION INTRAVASCULAR at 15:37

## 2020-10-11 ASSESSMENT — ENCOUNTER SYMPTOMS
VOMITING: 0
FEVER: 0
DYSURIA: 0
AGITATION: 0
CHEST TIGHTNESS: 0
WOUND: 0
NAUSEA: 0
CHILLS: 0
LIGHT-HEADEDNESS: 0
SHORTNESS OF BREATH: 0
NECK PAIN: 1
ARTHRALGIAS: 1

## 2020-10-11 ASSESSMENT — MIFFLIN-ST. JEOR: SCORE: 1287.4

## 2020-10-11 NOTE — ED TRIAGE NOTES
Pt here with , pt fell in her garden on some cement tiers, pt reports that she is having pain all the way from her neck to her hips, pt reports the greatest pain is in her left sided low back and chest, pt into bay 3 via w.c

## 2020-10-11 NOTE — ED PROVIDER NOTES
History     Chief Complaint   Patient presents with     Fall     Chest Pain     Back Pain     HPI  Hailey Shah is a 74 year old female who is here with her  after having fallen at home.  They were outside working on their tiered garden area.  She was standing on 1 that was about 2 feet above the lower one.  She fell off of this and landed onto the paving stones on the lower wall.  Landed right on her left side.  She was eventually able to get up with assistance and able to walk, however she complains of severe pain all on her left side down to her hip area.  Also some pain in her neck that is in both sides of the neck and also in the spine.  No blow to the head, no loss of consciousness.    Allergies:  Allergies   Allergen Reactions     Acetaminophen-Codeine Nausea and Vomiting     Sulfa Drugs      Other reaction(s): *Unknown - Childhood Rxn       Problem List:    Patient Active Problem List    Diagnosis Date Noted     Family history of ischemic heart disease 01/16/2018     Priority: Medium     Overview:   Brother had fatal myocardial infarction at age 60.       Lumbago 01/16/2018     Priority: Medium     Overview:   Chronic low back pain secondary to injury, 1995, with chronic pain syndrome.       Hyperlipidemia 08/28/2017     Priority: Medium     Overview:   IMO Update 10/11       Essential hypertension 08/28/2017     Priority: Medium     Overview:   IMO Update       PVC's (premature ventricular contractions) 12/14/2016     Priority: Medium     At risk for amiodarone toxicity with long term use 06/09/2016     Priority: Medium     Arthralgia of right hip 04/26/2016     Priority: Medium     Idiopathic cardiomyopathy (H) 09/24/2014     Priority: Medium     CKD (chronic kidney disease) stage 3, GFR 30-59 ml/min 02/24/2014     Priority: Medium     Raynaud's disease 01/25/2012     Priority: Medium     Arthropathy of shoulder region 05/28/2010     Priority: Medium     Overview:   IMO Update 10/11       RCT  (rotator cuff tear) 05/28/2010     Priority: Medium     Overview:   IMO Update 10/11       Shoulder pain 04/15/2010     Priority: Medium     Obesity 11/12/2008     Priority: Medium     Overview:   Updated per 10/1/17 IMO import       Pain in joint, lower leg 11/12/2008     Priority: Medium     Overview:   IMO Update 10/11       Raynaud's phenomenon (by history or observed) 11/12/2008     Priority: Medium     Overview:   IMO Update 10/11          Past Medical History:    Past Medical History:   Diagnosis Date     Chronic diastolic heart failure (H)      Chronic kidney disease, stage III (moderate) (H)      Essential (primary) hypertension      Hyperlipidemia      Low back pain      Other cardiomyopathies (CODE)      Personal history of other medical treatment (CODE)      Raynaud's syndrome without gangrene      Tinnitus      Ventricular premature depolarization      Ventricular tachycardia (H)        Past Surgical History:    Past Surgical History:   Procedure Laterality Date     ARTHROSCOPY SHOULDER Right 1989    Shoulder surgery for impingement     ARTHROSCOPY SHOULDER Left 1988     Left shoulder surgery for impingement     ARTHROSCOPY SHOULDER  08/2009    and debridement, distal clavical excision     Cardiac ablation for pvc      not successful     COLONOSCOPY      4/24/06,Inflammation noted, no further issues     COLONOSCOPY  07/28/2016 7/28/2016,wnl no need f/u     DILATION AND CURETTAGE      D&C for retained placentas x2     FINGER SURGERY Left 10/1989    fourth finger, cyst removal     HYSTERECTOMY TOTAL ABDOMINAL, BILATERAL SALPINGO-OOPHORECTOMY, COMBINED      1991,Soren-Clifton procedure     MAMMOPLASTY AUGMENTATION      1983     REPAIR BLADDER      7/1999, with [facility] and Juan Miguel procedure with cystoscopy       Family History:    Family History   Problem Relation Age of Onset     Heart Disease Mother         Heart Disease,CHF     Hypertension Mother         Hypertension     Other - See Comments Mother       "   rheumatic fever     Other - See Comments Father         Old age     Breast Cancer Sister         Cancer-breast     Heart Disease Brother         Heart Disease,MI     Blood Disease No family hx of         Blood Disease,no clotting disorders       Social History:  Marital Status:   [2]  Social History     Tobacco Use     Smoking status: Never Smoker     Smokeless tobacco: Never Used     Tobacco comment: Quit smoking: 3-4 diet coke daily   Substance Use Topics     Alcohol use: No     Alcohol/week: 0.0 standard drinks     Drug use: Never     Comment: Drug use: No        Medications:         HYDROcodone-acetaminophen (NORCO) 5-325 MG tablet       amiodarone (PACERONE/CODARONE) 100 MG TABS tablet       amLODIPine (NORVASC) 2.5 MG tablet       ascorbic acid (VITAMIN C) 500 MG tablet       aspirin EC 81 MG EC tablet       Calcium Carbonate-Vitamin D (CALCIUM 500 + D) 500-125 MG-UNIT TABS       furosemide (LASIX) 40 MG tablet       metoprolol succinate (TOPROL-XL) 50 MG 24 hr tablet       Multiple Vitamin (MULTI-VITAMINS) TABS       rosuvastatin (CRESTOR) 40 MG tablet       spironolactone (ALDACTONE) 25 MG tablet          Review of Systems   Constitutional: Negative for chills and fever.   HENT: Negative for congestion.    Eyes: Negative for visual disturbance.   Respiratory: Negative for chest tightness and shortness of breath.    Cardiovascular: Negative for chest pain.   Gastrointestinal: Negative for nausea and vomiting.   Genitourinary: Negative for dysuria.   Musculoskeletal: Positive for arthralgias and neck pain.   Skin: Negative for wound.   Neurological: Negative for light-headedness.   Psychiatric/Behavioral: Negative for agitation.       Physical Exam   BP: 138/60  Pulse: 60  Temp: 97.8  F (36.6  C)  Resp: 16  Height: 162.6 cm (5' 4\")  Weight: 80.7 kg (178 lb)  SpO2: 97 %      Physical Exam  Vitals signs and nursing note reviewed.   Constitutional:       Appearance: She is well-developed.   HENT:      " Head: Normocephalic and atraumatic.      Mouth/Throat:      Mouth: Mucous membranes are moist.   Eyes:      Conjunctiva/sclera: Conjunctivae normal.   Cardiovascular:      Rate and Rhythm: Normal rate and regular rhythm.      Heart sounds: Normal heart sounds.   Pulmonary:      Effort: Pulmonary effort is normal.      Breath sounds: Normal breath sounds.      Comments: Very tender to palpation left lower and lateral ribs.  Abdominal:      General: Abdomen is flat.      Tenderness: There is no abdominal tenderness.   Skin:     General: Skin is warm and dry.   Neurological:      Mental Status: She is alert and oriented to person, place, and time.   Psychiatric:         Mood and Affect: Mood normal.         Behavior: Behavior normal.         ED Course        Procedures                 Results for orders placed or performed during the hospital encounter of 10/11/20 (from the past 24 hour(s))   CBC with platelets differential   Result Value Ref Range    WBC 8.4 4.0 - 11.0 10e9/L    RBC Count 3.75 (L) 3.8 - 5.2 10e12/L    Hemoglobin 11.6 (L) 11.7 - 15.7 g/dL    Hematocrit 34.9 (L) 35.0 - 47.0 %    MCV 93 78 - 100 fl    MCH 30.9 26.5 - 33.0 pg    MCHC 33.2 31.5 - 36.5 g/dL    RDW 13.5 10.0 - 15.0 %    Platelet Count 202 150 - 450 10e9/L    Diff Method Automated Method     % Neutrophils 54.7 %    % Lymphocytes 34.9 %    % Monocytes 7.3 %    % Eosinophils 1.7 %    % Basophils 0.6 %    % Immature Granulocytes 0.8 %    Absolute Neutrophil 4.6 1.6 - 8.3 10e9/L    Absolute Lymphocytes 2.9 0.8 - 5.3 10e9/L    Absolute Monocytes 0.6 0.0 - 1.3 10e9/L    Absolute Eosinophils 0.1 0.0 - 0.7 10e9/L    Absolute Basophils 0.1 0.0 - 0.2 10e9/L    Abs Immature Granulocytes 0.1 0 - 0.4 10e9/L   Comprehensive metabolic panel   Result Value Ref Range    Sodium 143 134 - 144 mmol/L    Potassium 3.5 3.5 - 5.1 mmol/L    Chloride 107 98 - 107 mmol/L    Carbon Dioxide 29 21 - 31 mmol/L    Anion Gap 7 3 - 14 mmol/L    Glucose 145 (H) 70 - 105 mg/dL     Urea Nitrogen 17 7 - 25 mg/dL    Creatinine 1.29 (H) 0.60 - 1.20 mg/dL    GFR Estimate 40 (L) >60 mL/min/[1.73_m2]    GFR Estimate If Black 49 (L) >60 mL/min/[1.73_m2]    Calcium 8.8 8.6 - 10.3 mg/dL    Bilirubin Total 0.5 0.3 - 1.0 mg/dL    Albumin 4.1 3.5 - 5.7 g/dL    Protein Total 6.6 6.4 - 8.9 g/dL    Alkaline Phosphatase 40 34 - 104 U/L    ALT 17 7 - 52 U/L    AST 22 13 - 39 U/L   CT Cervical Spine w/o Contrast    Narrative    PROCEDURE: CT CERVICAL SPINE W/O CONTRAST     HISTORY: C-spine trauma, low clinical risk (NEXUS/CCR).    TECHNIQUE: Helical noncontrast CT images of the cervical spine.    COMPARISON: 7/22/20.    FINDINGS:     No acute fracture is identified. The vertebral bodies are normal in  height. The cervical lordosis is preserved. The C1-2 articulation and  the craniocervical junction are intact.     Degenerative changes are mild. No high-grade spinal stenosis is  present.     The paravertebral soft tissues are unremarkable.       Impression    IMPRESSION: No evidence of acute cervical spine fracture.    OPAL TRAVIS MD   CT Chest w Contrast    Addendum: 10/11/2020    Acute fractures of the left sixth, seventh and eighth ribs are seen,  without associated left pneumothorax or significant contusion.    OPAL TRAVIS MD      Narrative    PROCEDURE: CT CHEST W CONTRAST, CT ABDOMEN PELVIS W CONTRAST    HISTORY: Rib fx suspected, post trauma    COMPARISON: 1/20/2020    TECHNIQUE:  Initial pre-contrast  and localizer images were  obtained.  Contrast enhanced helical CT of the chest, abdomen and  pelvis was performed.  Routine transaxial and post-processed  (multiplanar and/or MIP) reformations were obtained.    MEDS/CONTRAST: 100ml visipaque 320    FINDINGS:   CHEST:    No periaortic or mediastinal hematoma is seen. No evidence of acute  aortic injury is seen. The heart is enlarged, unchanged. No  pericardial effusion is present. No abnormal lymphadenopathy is  present. A 17 mm  nodule in the right lower lobe is unchanged.  Dependent atelectasis is seen.    No acute fracture or suspicious osseous lesion is seen.    ABDOMEN/PELVIS:      Numerous calcific gallstones are seen. Early excreted contrast is  seen. There is a left renal cortical cyst. The liver, spleen,  pancreas, and adrenal glands are unremarkable. No evidence of solid  organ laceration or hematoma is seen.     The bladder is unremarkable.  There are no abnormally dilated loops of  bowel. No abnormal lymphadenopathy is present. No free air or free  fluid is seen. The infrarenal aorta is dilated up to 3.1 cm.    No acute fracture or suspicious osseous lesion is seen.      Impression    IMPRESSION:     No evidence of acute traumatic injury to the chest, abdomen or pelvis.    OPAL TRAVIS MD   CT Abdomen Pelvis w Contrast    Addendum: 10/11/2020    Acute fractures of the left sixth, seventh and eighth ribs are seen,  without associated left pneumothorax or significant contusion.    OPAL TRAVIS MD      Narrative    PROCEDURE: CT CHEST W CONTRAST, CT ABDOMEN PELVIS W CONTRAST    HISTORY: Rib fx suspected, post trauma    COMPARISON: 1/20/2020    TECHNIQUE:  Initial pre-contrast  and localizer images were  obtained.  Contrast enhanced helical CT of the chest, abdomen and  pelvis was performed.  Routine transaxial and post-processed  (multiplanar and/or MIP) reformations were obtained.    MEDS/CONTRAST: 100ml visipaque 320    FINDINGS:   CHEST:    No periaortic or mediastinal hematoma is seen. No evidence of acute  aortic injury is seen. The heart is enlarged, unchanged. No  pericardial effusion is present. No abnormal lymphadenopathy is  present. A 17 mm nodule in the right lower lobe is unchanged.  Dependent atelectasis is seen.    No acute fracture or suspicious osseous lesion is seen.    ABDOMEN/PELVIS:      Numerous calcific gallstones are seen. Early excreted contrast is  seen. There is a left renal cortical  cyst. The liver, spleen,  pancreas, and adrenal glands are unremarkable. No evidence of solid  organ laceration or hematoma is seen.     The bladder is unremarkable.  There are no abnormally dilated loops of  bowel. No abnormal lymphadenopathy is present. No free air or free  fluid is seen. The infrarenal aorta is dilated up to 3.1 cm.    No acute fracture or suspicious osseous lesion is seen.      Impression    IMPRESSION:     No evidence of acute traumatic injury to the chest, abdomen or pelvis.    OPAL TRAVIS MD       Medications   fentaNYL (PF) (SUBLIMAZE) injection 50 mcg (50 mcg Intravenous Given 10/11/20 1511)   iodixanol (VISIPAQUE 320) injection 100 mL (100 mLs Intravenous Given 10/11/20 1537)   fentaNYL (PF) (SUBLIMAZE) injection 50 mcg (50 mcg Intravenous Given 10/11/20 1631)   HYDROcodone-acetaminophen (NORCO) 5-325 MG per tablet 1 tablet (1 tablet Oral Given 10/11/20 1632)       Assessments & Plan (with Medical Decision Making)     I have reviewed the nursing notes.    I have reviewed the findings, diagnosis, plan and need for follow up with the patient.  Fractures of the left sixth through eighth ribs as above.  No other acute findings on CT scanning.  She has broken ribs in the past, so she says she knows what to expect.  She is feeling better after some fentanyl here.  We will send her home with prescription for some hydrocodone.  We discussed possible constipation and sedation from these.  Also discussed the need to occasionally take some deep breaths to prevent atelectasis and possible pneumonia.  Follow-up in clinic or ER if worse or not improving.    New Prescriptions    HYDROCODONE-ACETAMINOPHEN (NORCO) 5-325 MG TABLET    Take 1-2 tablets by mouth every 6 hours as needed for pain       Final diagnoses:   Closed fracture of three ribs, left, initial encounter       10/11/2020   [facility] [place]     Joey Hall MD  10/11/20 2941

## 2020-10-11 NOTE — ED AVS SNAPSHOT
[facility]  [address]  [place] [address]  Phone: [phone]  Fax: [phone]                                    Hialey Shah   MRN: [id number]    Department: [facility]   Date of Visit: 10/11/2020           After Visit Summary Signature Page    I have received my discharge instructions, and my questions have been answered. I have discussed any challenges I see with this plan with the nurse or doctor.    ..........................................................................................................................................  Patient/Patient Representative Signature      ..........................................................................................................................................  Patient Representative Print Name and Relationship to Patient    ..................................................               ................................................  Date                                   Time    ..........................................................................................................................................  Reviewed by Signature/Title    ...................................................              ..............................................  Date                                               Time          22EPIC Rev 08/18

## 2020-10-13 ENCOUNTER — NURSE TRIAGE (OUTPATIENT)
Dept: FAMILY MEDICINE | Facility: OTHER | Age: 75
End: 2020-10-13

## 2020-10-13 DIAGNOSIS — R11.2 NAUSEA AND VOMITING, INTRACTABILITY OF VOMITING NOT SPECIFIED, UNSPECIFIED VOMITING TYPE: Primary | ICD-10-CM

## 2020-10-13 RX ORDER — ONDANSETRON 4 MG/1
4 TABLET, FILM COATED ORAL EVERY 8 HOURS PRN
Qty: 30 TABLET | Refills: 3 | Status: SHIPPED | OUTPATIENT
Start: 2020-10-13 | End: 2020-10-27

## 2020-10-13 NOTE — TELEPHONE ENCOUNTER
Patient notified of response and recommendations per Roxana Maki MD and verbally understood.  She will call with any other questions or concerns.    Stephanie Miller LPN............10/13/2020 1:19 PM

## 2020-10-13 NOTE — TELEPHONE ENCOUNTER
Patient was in the ER on Sunday and has three broken ribs.  She is throwing up, nausea and states her BP is low.

## 2020-10-13 NOTE — TELEPHONE ENCOUNTER
"S-(situation): Patient call with concern of nausea and low BP.    B-(background): Fall on 10/11/2020, presented to ER, 3 broken rips.    A-(assessment): Patient prescribed HYDROcodone-acetaminophen (NORCO) 5-325 MG tablet, Take 1-2 tablets by mouth every 6 hours as needed for pain.  Patient has only been able to take 1 tablet at a time. Due to nausea. Nausea and vomiting began 10/12/2020 after attempting to take ibuprofen 600 mg in between hydrocodone dosing. Has not since taken ibuprofen, hydrocodone is also causing patient nausea and vomiting. Patient states vomiting 3 times yesterday, once today. Nausea is constant, vomiting occurs if trying to eat or drink. Only had water in sips since Sunday. Vomits after attempting any food intake. Urinating appropriately. Patient states pain is controlled as long as she is not moving, rating 5/10 currently. Needs assistance from  to get out of bed. Has primarily been laying down since Sunday. BP reading 99/56 yesterday, 93/56 this morning and 116/65 currently. Spasms have started to occur on side of fall, muscle spasms.Patient states \"muscles are extremely sore especially when vomiting occurs.\"    R-(recommendations): Per protocol, discussed dehydration risk, pain control and attempting to eat small amount of bland food prior to medication administration. Patient states she has tried this with no success, vomiting always occurs. Discussed mobility and risk associated with low mobility and current injury including pneumonia, injury healing, pain control. Patient states she feels if nausea was better controlled, pain control and oral intake would be improved.        Additional Information    Vomiting occurs    Vomiting a prescription medication    Protocols used: NAUSEA-A-OH, VOMITING-A-OH    Will route to PCP for recommendation and review. Patient utilizes [facility] for Rx needs.  Lorena Chandra RN ....................  10/13/2020   9:36 AM        "

## 2020-10-13 NOTE — TELEPHONE ENCOUNTER
Prescription for zofran 4 mg by mouth every 8 hrs as needed for nausea/vomiting sent to her pharmacy.  She should try to take a dose 30 minutes prior to trying to eat/drink to see if this helps her keep things down more successfully.  Roxana Maki MD

## 2020-10-15 ENCOUNTER — APPOINTMENT (OUTPATIENT)
Dept: GENERAL RADIOLOGY | Facility: OTHER | Age: 75
End: 2020-10-15
Attending: PHYSICIAN ASSISTANT
Payer: COMMERCIAL

## 2020-10-15 ENCOUNTER — NURSE TRIAGE (OUTPATIENT)
Dept: FAMILY MEDICINE | Facility: OTHER | Age: 75
End: 2020-10-15

## 2020-10-15 ENCOUNTER — HOSPITAL ENCOUNTER (EMERGENCY)
Facility: OTHER | Age: 75
Discharge: HOME | End: 2020-10-15
Attending: PHYSICIAN ASSISTANT | Admitting: PHYSICIAN ASSISTANT
Payer: COMMERCIAL

## 2020-10-15 VITALS
TEMPERATURE: 99.3 F | RESPIRATION RATE: 20 BRPM | HEART RATE: 52 BPM | BODY MASS INDEX: 30.39 KG/M2 | HEIGHT: 64 IN | OXYGEN SATURATION: 93 % | SYSTOLIC BLOOD PRESSURE: 136 MMHG | WEIGHT: 178 LBS | DIASTOLIC BLOOD PRESSURE: 70 MMHG

## 2020-10-15 DIAGNOSIS — T50.905A DRUG-INDUCED NAUSEA AND VOMITING: ICD-10-CM

## 2020-10-15 DIAGNOSIS — R07.89 LEFT-SIDED CHEST WALL PAIN: ICD-10-CM

## 2020-10-15 DIAGNOSIS — J98.11 ATELECTASIS: ICD-10-CM

## 2020-10-15 DIAGNOSIS — R11.2 DRUG-INDUCED NAUSEA AND VOMITING: ICD-10-CM

## 2020-10-15 DIAGNOSIS — S22.42XD CLOSED FRACTURE OF MULTIPLE RIBS OF LEFT SIDE WITH ROUTINE HEALING, SUBSEQUENT ENCOUNTER: ICD-10-CM

## 2020-10-15 PROCEDURE — 250N000011 HC RX IP 250 OP 636: Performed by: PHYSICIAN ASSISTANT

## 2020-10-15 PROCEDURE — 71101 X-RAY EXAM UNILAT RIBS/CHEST: CPT | Mod: LT

## 2020-10-15 PROCEDURE — 250N000013 HC RX MED GY IP 250 OP 250 PS 637: Performed by: PHYSICIAN ASSISTANT

## 2020-10-15 PROCEDURE — 99283 EMERGENCY DEPT VISIT LOW MDM: CPT | Performed by: PHYSICIAN ASSISTANT

## 2020-10-15 RX ORDER — IBUPROFEN 600 MG/1
600 TABLET, FILM COATED ORAL EVERY 6 HOURS PRN
Qty: 60 TABLET | Refills: 0 | Status: SHIPPED | OUTPATIENT
Start: 2020-10-15 | End: 2020-10-27

## 2020-10-15 RX ORDER — AZITHROMYCIN 250 MG/1
TABLET, FILM COATED ORAL
Qty: 6 TABLET | Refills: 0 | Status: SHIPPED | OUTPATIENT
Start: 2020-10-15 | End: 2020-10-27

## 2020-10-15 RX ORDER — PROCHLORPERAZINE MALEATE 5 MG
5 TABLET ORAL EVERY 6 HOURS PRN
Qty: 30 TABLET | Refills: 0 | Status: SHIPPED | OUTPATIENT
Start: 2020-10-15 | End: 2020-10-27

## 2020-10-15 RX ORDER — ONDANSETRON 4 MG/1
4 TABLET, ORALLY DISINTEGRATING ORAL EVERY 6 HOURS PRN
Qty: 30 TABLET | Refills: 0 | Status: SHIPPED | OUTPATIENT
Start: 2020-10-15 | End: 2020-10-27

## 2020-10-15 RX ORDER — ONDANSETRON 4 MG/1
4 TABLET, ORALLY DISINTEGRATING ORAL ONCE
Status: COMPLETED | OUTPATIENT
Start: 2020-10-15 | End: 2020-10-15

## 2020-10-15 RX ORDER — AZITHROMYCIN 250 MG/1
500 TABLET, FILM COATED ORAL ONCE
Status: COMPLETED | OUTPATIENT
Start: 2020-10-15 | End: 2020-10-15

## 2020-10-15 RX ORDER — IBUPROFEN 400 MG/1
800 TABLET, FILM COATED ORAL ONCE
Status: COMPLETED | OUTPATIENT
Start: 2020-10-15 | End: 2020-10-15

## 2020-10-15 RX ORDER — HYDROCODONE BITARTRATE AND ACETAMINOPHEN 5; 325 MG/1; MG/1
1 TABLET ORAL EVERY 6 HOURS PRN
Qty: 20 TABLET | Refills: 0 | Status: SHIPPED | OUTPATIENT
Start: 2020-10-15 | End: 2020-10-27

## 2020-10-15 RX ORDER — OXYCODONE AND ACETAMINOPHEN 5; 325 MG/1; MG/1
1 TABLET ORAL ONCE
Status: DISCONTINUED | OUTPATIENT
Start: 2020-10-15 | End: 2020-10-15 | Stop reason: HOSPADM

## 2020-10-15 RX ORDER — HYDROCODONE BITARTRATE AND ACETAMINOPHEN 5; 325 MG/1; MG/1
1 TABLET ORAL ONCE
Status: COMPLETED | OUTPATIENT
Start: 2020-10-15 | End: 2020-10-15

## 2020-10-15 RX ORDER — PROCHLORPERAZINE MALEATE 5 MG
5 TABLET ORAL ONCE
Status: COMPLETED | OUTPATIENT
Start: 2020-10-15 | End: 2020-10-15

## 2020-10-15 RX ADMIN — ONDANSETRON 4 MG: 4 TABLET, ORALLY DISINTEGRATING ORAL at 12:34

## 2020-10-15 RX ADMIN — PROCHLORPERAZINE MALEATE 5 MG: 5 TABLET ORAL at 14:09

## 2020-10-15 RX ADMIN — HYDROCODONE BITARTRATE AND ACETAMINOPHEN 1 TABLET: 5; 325 TABLET ORAL at 13:48

## 2020-10-15 RX ADMIN — IBUPROFEN 800 MG: 400 TABLET ORAL at 13:03

## 2020-10-15 RX ADMIN — AZITHROMYCIN MONOHYDRATE 500 MG: 250 TABLET ORAL at 13:48

## 2020-10-15 ASSESSMENT — ENCOUNTER SYMPTOMS
WHEEZING: 0
COUGH: 0
SEIZURES: 0
RHINORRHEA: 0
FACIAL SWELLING: 0
NAUSEA: 0
FREQUENCY: 0
SORE THROAT: 0
VOMITING: 0
WEAKNESS: 0
FACIAL ASYMMETRY: 0
TROUBLE SWALLOWING: 0
COLOR CHANGE: 0
CONSTIPATION: 0
EYE PAIN: 0
FEVER: 0
CHEST TIGHTNESS: 0
DIZZINESS: 0
DIARRHEA: 0
NECK STIFFNESS: 0
APPETITE CHANGE: 0
HEADACHES: 0
SHORTNESS OF BREATH: 0
NECK PAIN: 0
SPEECH DIFFICULTY: 0
DYSURIA: 0
BACK PAIN: 0
TREMORS: 0
ACTIVITY CHANGE: 0
LIGHT-HEADEDNESS: 0
ABDOMINAL PAIN: 0
STRIDOR: 0
FATIGUE: 0

## 2020-10-15 ASSESSMENT — MIFFLIN-ST. JEOR: SCORE: 1287.4

## 2020-10-15 NOTE — TELEPHONE ENCOUNTER
Patient notified of providers response.  Yanna Tierney LPN........................10/15/2020  11:23 AM

## 2020-10-15 NOTE — TELEPHONE ENCOUNTER
Pt states that she is still in a lot of pain and she keeps throwing up the pain medication and anti-nausea medication

## 2020-10-15 NOTE — TELEPHONE ENCOUNTER
I would recommend she be seen in clinic or the ER today for recheck to have her symptoms and pain addressed since she is lightheaded and dizzy.    Raegan Gomez PA-C.......... 10/15/2020 11:14 AM

## 2020-10-15 NOTE — ED TRIAGE NOTES
"Pt had a fall on Sunday and was seen in the ED. Pt has a large contusion on left flank. Pt presents to ED today with a chief complaint of nausea and not able to eat or drink much since. Pt states \"Feel sick to my stomach\".   "

## 2020-10-15 NOTE — ED PROVIDER NOTES
History     Chief Complaint   Patient presents with     Nausea     Rib Pain     HPI  Hailey Shah is a 74 year old female who was seen in the emergency department 4 days ago on 10/11/2020 due to a fall in her garden.  She sustained multiple rib fractures to her sixth, seventh and eighth ribs on the left lateral side of her chest wall.  She reports that she woke up last night with severe pain.  She took an antinausea tablet that she had but shortly afterwards vomited up her pain pill as well.  She also reports she is getting low on pain medications.  She is here for further evaluation denies any significant shortness of breath.  No fever or chills.  No sore throat, cough, or flulike symptoms.    Allergies:  Allergies   Allergen Reactions     Acetaminophen-Codeine Nausea and Vomiting     Sulfa Drugs      Other reaction(s): *Unknown - Childhood Rxn       Problem List:    Patient Active Problem List    Diagnosis Date Noted     Family history of ischemic heart disease 01/16/2018     Priority: Medium     Overview:   Brother had fatal myocardial infarction at age 60.       Lumbago 01/16/2018     Priority: Medium     Overview:   Chronic low back pain secondary to injury, 1995, with chronic pain syndrome.       Hyperlipidemia 08/28/2017     Priority: Medium     Overview:   IMO Update 10/11       Essential hypertension 08/28/2017     Priority: Medium     Overview:   IMO Update       PVC's (premature ventricular contractions) 12/14/2016     Priority: Medium     At risk for amiodarone toxicity with long term use 06/09/2016     Priority: Medium     Arthralgia of right hip 04/26/2016     Priority: Medium     Idiopathic cardiomyopathy (H) 09/24/2014     Priority: Medium     CKD (chronic kidney disease) stage 3, GFR 30-59 ml/min 02/24/2014     Priority: Medium     Raynaud's disease 01/25/2012     Priority: Medium     Arthropathy of shoulder region 05/28/2010     Priority: Medium     Overview:   IMO Update 10/11       RCT  (rotator cuff tear) 05/28/2010     Priority: Medium     Overview:   IMO Update 10/11       Shoulder pain 04/15/2010     Priority: Medium     Obesity 11/12/2008     Priority: Medium     Overview:   Updated per 10/1/17 IMO import       Pain in joint, lower leg 11/12/2008     Priority: Medium     Overview:   IMO Update 10/11       Raynaud's phenomenon (by history or observed) 11/12/2008     Priority: Medium     Overview:   IMO Update 10/11          Past Medical History:    Past Medical History:   Diagnosis Date     Chronic diastolic heart failure (H)      Chronic kidney disease, stage III (moderate)      Essential (primary) hypertension      Hyperlipidemia      Low back pain      Other cardiomyopathies (CODE)      Personal history of other medical treatment (CODE)      Raynaud's syndrome without gangrene      Tinnitus      Ventricular premature depolarization      Ventricular tachycardia (H)        Past Surgical History:    Past Surgical History:   Procedure Laterality Date     ARTHROSCOPY SHOULDER Right 1989    Shoulder surgery for impingement     ARTHROSCOPY SHOULDER Left 1988     Left shoulder surgery for impingement     ARTHROSCOPY SHOULDER  08/2009    and debridement, distal clavical excision     Cardiac ablation for pvc      not successful     COLONOSCOPY      4/24/06,Inflammation noted, no further issues     COLONOSCOPY  07/28/2016 7/28/2016,wnl no need f/u     DILATION AND CURETTAGE      D&C for retained placentas x2     FINGER SURGERY Left 10/1989    fourth finger, cyst removal     HYSTERECTOMY TOTAL ABDOMINAL, BILATERAL SALPINGO-OOPHORECTOMY, COMBINED      1991,Soren-Clifton procedure     MAMMOPLASTY AUGMENTATION      1983     REPAIR BLADDER      7/1999, with [facility] and Juan Miguel procedure with cystoscopy       Family History:    Family History   Problem Relation Age of Onset     Heart Disease Mother         Heart Disease,CHF     Hypertension Mother         Hypertension     Other - See Comments Mother          rheumatic fever     Other - See Comments Father         Old age     Breast Cancer Sister         Cancer-breast     Heart Disease Brother         Heart Disease,MI     Blood Disease No family hx of         Blood Disease,no clotting disorders       Social History:  Marital Status:   [2]  Social History     Tobacco Use     Smoking status: Never Smoker     Smokeless tobacco: Never Used     Tobacco comment: Quit smoking: 3-4 diet coke daily   Substance Use Topics     Alcohol use: No     Alcohol/week: 0.0 standard drinks     Drug use: Never     Comment: Drug use: No        Medications:         aspirin EC 81 MG EC tablet       HYDROcodone-acetaminophen (NORCO) 5-325 MG tablet       metoprolol succinate (TOPROL-XL) 50 MG 24 hr tablet       rosuvastatin (CRESTOR) 40 MG tablet       spironolactone (ALDACTONE) 25 MG tablet       amiodarone (PACERONE/CODARONE) 100 MG TABS tablet       amLODIPine (NORVASC) 2.5 MG tablet       ascorbic acid (VITAMIN C) 500 MG tablet       Calcium Carbonate-Vitamin D (CALCIUM 500 + D) 500-125 MG-UNIT TABS       furosemide (LASIX) 40 MG tablet       Multiple Vitamin (MULTI-VITAMINS) TABS       ondansetron (ZOFRAN) 4 MG tablet          Review of Systems   Constitutional: Negative for activity change, appetite change, fatigue and fever.   HENT: Negative for drooling, facial swelling, rhinorrhea, sore throat and trouble swallowing.    Eyes: Negative for pain and visual disturbance.   Respiratory: Negative for cough, chest tightness, shortness of breath, wheezing and stridor.    Cardiovascular: Positive for chest pain. Negative for leg swelling.        Palpable left lateral chest wall pain   Gastrointestinal: Negative for abdominal pain, constipation, diarrhea, nausea and vomiting.   Genitourinary: Negative for dysuria, frequency and urgency.   Musculoskeletal: Negative for back pain, neck pain and neck stiffness.   Skin: Negative for color change.   Neurological: Negative for dizziness, tremors,  "seizures, facial asymmetry, speech difficulty, weakness, light-headedness and headaches.       Physical Exam   BP: (!) 152/83  Pulse: 59  Temp: 99.1  F (37.3  C)  Resp: 20  Height: 162.6 cm (5' 4\")  Weight: 80.7 kg (178 lb)(stated)  SpO2: 97 %      Physical Exam  Constitutional:       General: She is not in acute distress.     Appearance: She is not ill-appearing, toxic-appearing or diaphoretic.   HENT:      Head: No raccoon eyes or Gary's sign.      Jaw: No trismus.      Right Ear: No drainage or tenderness.      Left Ear: No drainage or tenderness.      Nose: Nose normal.   Eyes:      General: No scleral icterus.     Extraocular Movements: Extraocular movements intact.      Right eye: Normal extraocular motion and no nystagmus.      Left eye: Normal extraocular motion and no nystagmus.      Pupils: Pupils are equal, round, and reactive to light.      Right eye: Pupil is reactive and not sluggish.      Left eye: Pupil is reactive and not sluggish.      Funduscopic exam:     Right eye: No AV nicking, arteriolar narrowing or papilledema. Red reflex present.         Left eye: No AV nicking, arteriolar narrowing or papilledema. Red reflex present.  Neck:      Musculoskeletal: Normal range of motion. Normal range of motion. No neck rigidity, pain with movement, spinous process tenderness or muscular tenderness.      Vascular: No JVD.      Trachea: No tracheal deviation.   Cardiovascular:      Rate and Rhythm: Normal rate and regular rhythm.      Pulses: Normal pulses.   Pulmonary:      Effort: Pulmonary effort is normal. No respiratory distress.      Breath sounds: Normal breath sounds. No stridor. No wheezing.      Comments: Lung sounds clear.  SaO2 is  No respiratory distress or tachypnea noted.  Abdominal:      General: There is no distension.      Palpations: There is no mass.      Tenderness: There is no abdominal tenderness. There is no right CVA tenderness, left CVA tenderness, guarding or rebound. "   Musculoskeletal: Normal range of motion.         General: No tenderness or deformity.   Lymphadenopathy:      Cervical: No cervical adenopathy.      Right cervical: No superficial cervical adenopathy.     Left cervical: No superficial cervical adenopathy.   Skin:     General: Skin is warm and dry.      Capillary Refill: Capillary refill takes less than 2 seconds.   Neurological:      Mental Status: She is alert and oriented to person, place, and time.      GCS: GCS eye subscore is 4. GCS verbal subscore is 5. GCS motor subscore is 6.      Motor: No tremor or seizure activity.      Coordination: Coordination normal.      Gait: Gait normal.         ED Course     Results for orders placed or performed during the hospital encounter of 10/15/20 (from the past 24 hour(s))   XR Ribs & Chest Lt 3v    Narrative    PROCEDURE: XR RIBS & CHEST LT 3VW 10/15/2020 1:28 PM    HISTORY: rib fractures    COMPARISONS: None.    TECHNIQUE: PA view of the chest and 3 views of left ribs.    FINDINGS: Heart is mildly enlarged. Right lung and pleural space are  clear. There is a small-to-moderate size left pleural effusion with  some adjacent patchy airspace disease.    There is a mildly displaced left posterolateral sixth rib fracture. No  other fracture is seen and there is no pneumothorax.         Impression    IMPRESSION: Left posterolateral sixth rib fracture with joint effusion  and patchy airspace disease in the left lower lobe.    SYED OROZCO MD       Medications   oxyCODONE-acetaminophen (PERCOCET) 5-325 MG per tablet 1 tablet (1 tablet Oral Not Given 10/15/20 1310)   ondansetron (ZOFRAN-ODT) ODT tab 4 mg (4 mg Oral Given 10/15/20 1234)   ibuprofen (ADVIL/MOTRIN) tablet 800 mg (800 mg Oral Given 10/15/20 1303)   HYDROcodone-acetaminophen (NORCO) 5-325 MG per tablet 1 tablet (1 tablet Oral Given 10/15/20 1348)   azithromycin (ZITHROMAX) tablet 500 mg (500 mg Oral Given 10/15/20 1348)   prochlorperazine (COMPAZINE) tablet 5 mg  (5 mg Oral Given 10/15/20 9175)       Assessments & Plan (with Medical Decision Making)     I have reviewed the nursing notes.    I have reviewed the findings, diagnosis, plan and need for follow up with the patient.      New Prescriptions    AZITHROMYCIN (ZITHROMAX Z-MEDHAT) 250 MG TABLET    Two tablets on the first day, then one tablet daily for the next 4 days    HYDROCODONE-ACETAMINOPHEN (NORCO) 5-325 MG TABLET    Take 1 tablet by mouth every 6 hours as needed for moderate to severe pain    IBUPROFEN (ADVIL/MOTRIN) 600 MG TABLET    Take 1 tablet (600 mg) by mouth every 6 hours as needed for moderate pain    ONDANSETRON (ZOFRAN ODT) 4 MG ODT TAB    Take 1 tablet (4 mg) by mouth every 6 hours as needed for nausea (take orally 30 minutes prior to pain medications)    PROCHLORPERAZINE (COMPAZINE) 5 MG TABLET    Take 1 tablet (5 mg) by mouth every 6 hours as needed for nausea or vomiting (take orally 30 minutes prior to pain medications)       Final diagnoses:   Closed fracture of multiple ribs of left side with routine healing, subsequent encounter   Left-sided chest wall pain   Drug-induced nausea and vomiting - from narcotics   Atelectasis     Afebrile.  Vital signs stable.  Patient with a fall 2 days ago with left-sided chest wall tenderness and multiple fractured ribs left lateral sixth-eighth.  Now with continued pain.  She tried taking her pain pills however this caused increased nausea and episodes of emesis.  She is here for further evaluation at this time.  X-ray show continued fractures to the left lateral sixth, seventh and eighth ribs.  and new patchy airspace disease in the left lower lobe.  The patient was instructed on incentive spirometry due to this atelectasis.  She was given azithromycin as well.  The patient was given Zofran here and 30 minutes later was given hydrocodone and Motrin.  She had some continued nausea and she was given Compazine as well.  Gradually with time she is feeling much better  with the above treatment and feels she can return home.  Continue to monitor and return if there is any concern for further evaluation as needed.  Rx for azithromycin, Norco, Motrin, Zofran, Compazine.  10/15/2020   [facility] CenterTom ch PA-C  10/15/20 5419

## 2020-10-15 NOTE — TELEPHONE ENCOUNTER
"S-(situation): Per call center-Pt states that she is still in a lot of pain and she keeps throwing up the pain medication and anti-nausea medication    B-(background): Please refer 10/11/2020 ED visit. Dx. Close fracture of 3 ribs.and 10/13/2020 Triage encounter-Prescription for zofran 4 mg by mouth every 8 hrs as needed for nausea/vomiting sent to her pharmacy.  She should try to take a dose 30 minutes prior to trying to eat/drink to see if this helps her keep things down more successfully.  Roxana Maki MD    A-(assessment): At 0430 this morning I took the antinausea medication and threw it up 3 minutes later and then I threw up again and it contained the the pain pills I took last night at 1115 pm. I have been throwing up about 3 times a day. I am able to keep water down. Last urinated at 0730 this morning. I am little dizzy/ lightheaded and need help to get out of bed because the pain but it gets better after I am up and sit for awhile. Rates pain at 11/10. I have been deep breathing\".     R-(recommendations): Protocol recommends patient go to the ED, \" I really don't want to want to if I don't have to. Is there anything else I can do\"? Writer informed patient that PCP is out of clinic and offered to send to [facility] for review and advisement.     Pt requests physician consideration and a callback today please    Meghna Mendez RN on 10/15/2020 at 8:40 AM      Reason for Disposition    MODERATE vomiting (e.g., 3 - 5 times/day) and age > 60    Additional Information    Negative: Shock suspected (e.g., cold/pale/clammy skin, too weak to stand, low BP, rapid pulse)    Negative: Difficult to awaken or acting confused (e.g., disoriented, slurred speech)    Negative: Sounds like a life-threatening emergency to the triager    Negative: Vomiting occurs only while coughing    Negative: Pregnant < 20 Weeks and nausea/vomiting began in early pregnancy (i.e., 4-8 weeks pregnant)    Negative: Chest pain    Negative: " Headache is main symptom    Negative: SEVERE vomiting (e.g., 6 or more times/day)    Protocols used: VOMITING-A-OH

## 2020-10-15 NOTE — ED AVS SNAPSHOT
[facility]  [address]  [place] [address]  Phone: [phone]  Fax: [phone]                                    Hailey Shah   MRN: [id number]    Department: [facility]   Date of Visit: 10/15/2020           After Visit Summary Signature Page    I have received my discharge instructions, and my questions have been answered. I have discussed any challenges I see with this plan with the nurse or doctor.    ..........................................................................................................................................  Patient/Patient Representative Signature      ..........................................................................................................................................  Patient Representative Print Name and Relationship to Patient    ..................................................               ................................................  Date                                   Time    ..........................................................................................................................................  Reviewed by Signature/Title    ...................................................              ..............................................  Date                                               Time          22EPIC Rev 08/18

## 2020-10-27 ENCOUNTER — OFFICE VISIT (OUTPATIENT)
Dept: FAMILY MEDICINE | Facility: OTHER | Age: 75
End: 2020-10-27
Attending: FAMILY MEDICINE
Payer: COMMERCIAL

## 2020-10-27 VITALS
RESPIRATION RATE: 18 BRPM | DIASTOLIC BLOOD PRESSURE: 70 MMHG | BODY MASS INDEX: 29.21 KG/M2 | HEART RATE: 56 BPM | TEMPERATURE: 97.3 F | SYSTOLIC BLOOD PRESSURE: 120 MMHG | HEIGHT: 64 IN | WEIGHT: 171.13 LBS | OXYGEN SATURATION: 97 %

## 2020-10-27 DIAGNOSIS — S22.42XD CLOSED FRACTURE OF MULTIPLE RIBS OF LEFT SIDE WITH ROUTINE HEALING, SUBSEQUENT ENCOUNTER: Primary | ICD-10-CM

## 2020-10-27 DIAGNOSIS — K59.03 DRUG-INDUCED CONSTIPATION: ICD-10-CM

## 2020-10-27 PROCEDURE — G0463 HOSPITAL OUTPT CLINIC VISIT: HCPCS

## 2020-10-27 PROCEDURE — 99213 OFFICE O/P EST LOW 20 MIN: CPT | Performed by: FAMILY MEDICINE

## 2020-10-27 RX ORDER — IBUPROFEN 600 MG/1
600 TABLET, FILM COATED ORAL EVERY 6 HOURS PRN
Qty: 60 TABLET | Refills: 2 | Status: SHIPPED | OUTPATIENT
Start: 2020-10-27 | End: 2020-12-14

## 2020-10-27 ASSESSMENT — ENCOUNTER SYMPTOMS
FEVER: 0
NERVOUS/ANXIOUS: 0
SHORTNESS OF BREATH: 0
COUGH: 0
CHILLS: 0

## 2020-10-27 ASSESSMENT — PAIN SCALES - GENERAL: PAINLEVEL: MODERATE PAIN (5)

## 2020-10-27 ASSESSMENT — MIFFLIN-ST. JEOR: SCORE: 1256.22

## 2020-10-27 NOTE — PATIENT INSTRUCTIONS
You could try magnesium citrate to help with the constipation and also start miralax 1 capful daily with 8 oz of liquid.

## 2020-10-27 NOTE — NURSING NOTE
Patient presents to clinic for follow up ER visits on 10/11 and 10/15/20 after fall.  She did fracture left ribs and is experiencing constant pain rated at 5.    Medication Reconciliation: complete    Stephanie Miller LPN

## 2020-10-27 NOTE — PROGRESS NOTES
SUBJECTIVE:   Nursing Notes:   Stephanie Miller LPN  10/27/2020  9:59 AM  Sign at exiting of workspace  Patient presents to clinic for follow up ER visits on 10/11 and 10/15/20 after fall.  She did fracture left ribs and is experiencing constant pain rated at 5.    Medication Reconciliation: complete    Stephanie Miller LPN        Hailey Shah is a 74 year old female who presents to clinic today for follow up of 2 Emergency Department visits on 10/11/2020 and 10/15/2020.  She had been seen on 10/11/2020 after having fallen at home.  She and her  had been working on a tiered garden.  She had fallen off of about a 2 foot ledge and landed on a paving stone of a lower garden ledge on her left side.  She had pain on her left torso and left hip area as well as her neck and spine.  Fractures of her left 6th-8th ribs were seen.  She was given a prescription for hydrocodone and was discharged to home.  She returned to the Emergency Department on 10/15/2020 with nausea/vomiting related to the rib pain and also the pain medication.  Chest x-ray was felt to show atelectasis, but no pneumonia.  She was treated however with zithromax.  She has had zofran available to use for the nausea as well as compazine.  She had used the norco for the first week and just ibuprofen since then.  At night, takes a tylenol PM + a regular tylenol.  During the day, she is taking ibuprofen.  The pain is better now compared to what it was like 2 weeks ago.  Not eating great.  Her appetite has been decreased.  She has a sense of pressure across her upper abdomen.  Gallstones were seen on CT.  Doesn't recall having had any abdomen pain episodes prior to this.  Has not had normal bowel movements for a couple of weeks.  Has been 3-4 days since her last bowel movement.    HPI    I personally reviewed medications/allergies/history listed below:    Patient Active Problem List    Diagnosis Date Noted     Family history of ischemic heart disease  01/16/2018     Priority: Medium     Overview:   Brother had fatal myocardial infarction at age 60.       Lumbago 01/16/2018     Priority: Medium     Overview:   Chronic low back pain secondary to injury, 1995, with chronic pain syndrome.       Hyperlipidemia 08/28/2017     Priority: Medium     Overview:   IMO Update 10/11       Essential hypertension 08/28/2017     Priority: Medium     Overview:   IMO Update       PVC's (premature ventricular contractions) 12/14/2016     Priority: Medium     At risk for amiodarone toxicity with long term use 06/09/2016     Priority: Medium     Arthralgia of right hip 04/26/2016     Priority: Medium     Idiopathic cardiomyopathy (H) 09/24/2014     Priority: Medium     CKD (chronic kidney disease) stage 3, GFR 30-59 ml/min 02/24/2014     Priority: Medium     Raynaud's disease 01/25/2012     Priority: Medium     Arthropathy of shoulder region 05/28/2010     Priority: Medium     Overview:   IMO Update 10/11       RCT (rotator cuff tear) 05/28/2010     Priority: Medium     Overview:   IMO Update 10/11       Shoulder pain 04/15/2010     Priority: Medium     Obesity 11/12/2008     Priority: Medium     Overview:   Updated per 10/1/17 IMO import       Pain in joint, lower leg 11/12/2008     Priority: Medium     Overview:   IMO Update 10/11       Raynaud's phenomenon (by history or observed) 11/12/2008     Priority: Medium     Overview:   IMO Update 10/11       Past Medical History:   Diagnosis Date     Chronic diastolic heart failure (H)     2/24/2014,ECHO 10/30/2013 - Final Impressions:  1. Mild left ventricular enlargement with a mild decrease in systolic function, estimated ejection fraction 45%.  2. Mild right ventricular enlargement with normal systolic function.  3. Mild left atrial enlargement.  4. Mild mitral regurgitation.  5. Trace tricuspid regurgitation.  6. Mild left ventricular diastolic dysfunction.  7. When compared t*     Chronic kidney disease, stage III (moderate)      2014     Essential (primary) hypertension     No Comments Provided     Hyperlipidemia     No Comments Provided     Low back pain     Chronic low back pain secondary to injury, , with chronic pain syndrome.     Other cardiomyopathies (CODE)     2013,Diagnosed by angio and cath study 2013 at [facility]. EF of 35% at first study     Personal history of other medical treatment (CODE)      with all vaginal deliveries     Raynaud's syndrome without gangrene     No Comments Provided     Tinnitus     No Comments Provided     Ventricular premature depolarization     3/25/2013     Ventricular tachycardia (H)     3/4/2014      Past Surgical History:   Procedure Laterality Date     ARTHROSCOPY SHOULDER Right     Shoulder surgery for impingement     ARTHROSCOPY SHOULDER Left      Left shoulder surgery for impingement     ARTHROSCOPY SHOULDER  2009    and debridement, distal clavical excision     Cardiac ablation for pvc      not successful     COLONOSCOPY      06,Inflammation noted, no further issues     COLONOSCOPY  2016,wnl no need f/u     DILATION AND CURETTAGE      D&C for retained placentas x2     FINGER SURGERY Left 10/1989    fourth finger, cyst removal     HYSTERECTOMY TOTAL ABDOMINAL, BILATERAL SALPINGO-OOPHORECTOMY, COMBINED      ,Soren-Clifton procedure     MAMMOPLASTY AUGMENTATION      1983     REPAIR BLADDER      1999, with [facility] and Juan Miguel procedure with cystoscopy     Family History   Problem Relation Age of Onset     Heart Disease Mother         Heart Disease,CHF     Hypertension Mother         Hypertension     Other - See Comments Mother         rheumatic fever     Other - See Comments Father         Old age     Breast Cancer Sister         Cancer-breast     Heart Disease Brother         Heart Disease,MI     Blood Disease No family hx of         Blood Disease,no clotting disorders     Social History     Tobacco Use     Smoking status: Never Smoker      Smokeless tobacco: Never Used     Tobacco comment: Quit smoking: 3-4 diet coke daily   Substance Use Topics     Alcohol use: No     Alcohol/week: 0.0 standard drinks     Social History     Social History Narrative    . Gerardo - .  Retired from [employer] as a -retired 1/18/2012.  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    Children: Fariba Umana, 1963, Ravindra, [place], Shreya Sierra, 1965, Tricia Hong, Dominic Shah, 1967, [place], Fouzia Avalos, 1968, Oark     Current Outpatient Medications   Medication Sig Dispense Refill     amiodarone (PACERONE/CODARONE) 100 MG TABS tablet Take 1.5 tablets (150 mg) by mouth daily Take 1 1/2 tablet daily 135 tablet 3     amLODIPine (NORVASC) 2.5 MG tablet Take 2.5 mg by mouth       ascorbic acid (VITAMIN C) 500 MG tablet Take 500 mg by mouth       aspirin EC 81 MG EC tablet Take 81 mg by mouth       Calcium Carbonate-Vitamin D (CALCIUM 500 + D) 500-125 MG-UNIT TABS        furosemide (LASIX) 40 MG tablet Take 40 mg by mouth       ibuprofen (ADVIL/MOTRIN) 600 MG tablet Take 1 tablet (600 mg) by mouth every 6 hours as needed for moderate pain 60 tablet 2     metoprolol succinate (TOPROL-XL) 50 MG 24 hr tablet TAKE 1 TABLET BY MOUTH EVERY DAY 30 tablet 0     Multiple Vitamin (MULTI-VITAMINS) TABS Take 1 tablet by mouth       rosuvastatin (CRESTOR) 40 MG tablet Take 1 tablet (40 mg) by mouth At Bedtime 90 tablet 1     spironolactone (ALDACTONE) 25 MG tablet TAKE 1/2 TABLET BY MOUTH DAILY 45 tablet 4     Allergies   Allergen Reactions     Acetaminophen-Codeine Nausea and Vomiting     Sulfa Drugs      Other reaction(s): *Unknown - Childhood Rxn       Review of Systems   Constitutional: Negative for chills and fever.   Respiratory: Negative for cough and shortness of breath.    Cardiovascular: Positive for chest pain (left sided chest wall pain). Negative for peripheral edema.   Psychiatric/Behavioral: Negative for mood changes. The patient is not  "nervous/anxious.         OBJECTIVE:     /70 (BP Location: Right arm, Patient Position: Sitting, Cuff Size: Adult Regular)   Pulse 56   Temp 97.3  F (36.3  C) (Tympanic)   Resp 18   Ht 1.626 m (5' 4\")   Wt 77.6 kg (171 lb 2 oz)   LMP  (LMP Unknown)   SpO2 97%   Breastfeeding No   BMI 29.37 kg/m    Body mass index is 29.37 kg/m .  Physical Exam  Constitutional:       Appearance: Normal appearance.   HENT:      Head: Normocephalic.   Eyes:      Extraocular Movements: Extraocular movements intact.      Pupils: Pupils are equal, round, and reactive to light.   Neck:      Musculoskeletal: Normal range of motion and neck supple.   Cardiovascular:      Rate and Rhythm: Normal rate and regular rhythm.      Pulses: Normal pulses.      Heart sounds: No murmur.   Pulmonary:      Effort: Pulmonary effort is normal.      Breath sounds: Normal breath sounds. No wheezing, rhonchi or rales.   Chest:      Chest wall: Tenderness (left sided rib pain to palpation) present.   Musculoskeletal:      Right lower leg: No edema.      Left lower leg: No edema.   Neurological:      Mental Status: She is alert.   Psychiatric:         Mood and Affect: Mood normal.         Behavior: Behavior normal.           PHQ-9 SCORE 12/23/2015 4/26/2016 8/28/2017   PHQ-9 Total Score 5 9 0       PHQ-2 Score:     PHQ-2 ( 1999 Pf[employer]) 10/27/2020 7/27/2020   Q1: Little interest or pleasure in doing things 0 0   Q2: Feeling down, depressed or hopeless 0 0   PHQ-2 Score 0 0       SANGEETA-7 SCORE 7/22/2020   Total Score 0         I personally reviewed results withpatient as listed below:   Diagnostic Test Results:  none     ASSESSMENT/PLAN:       ICD-10-CM    1. Closed fracture of multiple ribs of left side with routine healing, subsequent encounter  S22.42XD ibuprofen (ADVIL/MOTRIN) 600 MG tablet   2. Drug-induced constipation  K59.03        1.  Refilled ibuprofen.  She is improving from her initial injury.  Follow-up as needed.  2.  Recommended that " she try mag citrate initially and start also on MiraLAX 1 capful daily with 8 ounces of liquid to help mobilize her stool.  Follow-up if needed.    Roxana Maki MD  [facility]    Portions of this dictation were created using the Dragon Nuance voice recognition system. Proofreading was completed but there may be errors in text.

## 2020-11-17 ENCOUNTER — HOSPITAL ENCOUNTER (INPATIENT)
Facility: OTHER | Age: 75
LOS: 2 days | Discharge: HOME | DRG: 689 | End: 2020-11-19
Attending: PHYSICIAN ASSISTANT | Admitting: FAMILY MEDICINE
Payer: COMMERCIAL

## 2020-11-17 ENCOUNTER — APPOINTMENT (OUTPATIENT)
Dept: GENERAL RADIOLOGY | Facility: OTHER | Age: 75
DRG: 689 | End: 2020-11-17
Attending: PHYSICIAN ASSISTANT
Payer: COMMERCIAL

## 2020-11-17 DIAGNOSIS — N30.01 ACUTE CYSTITIS WITH HEMATURIA: ICD-10-CM

## 2020-11-17 DIAGNOSIS — I12.9 MALIGNANT HYPERTENSIVE KIDNEY DISEASE WITH CHRONIC KIDNEY DISEASE STAGE I THROUGH STAGE IV, OR UNSPECIFIED(403.00): ICD-10-CM

## 2020-11-17 DIAGNOSIS — E87.6 HYPOKALEMIA: ICD-10-CM

## 2020-11-17 DIAGNOSIS — R79.89 ELEVATED TROPONIN: ICD-10-CM

## 2020-11-17 DIAGNOSIS — Z03.818 ENCNTR FOR OBS FOR SUSP EXPSR TO OTH BIOLG AGENTS RULED OUT: ICD-10-CM

## 2020-11-17 DIAGNOSIS — J18.9 COMMUNITY ACQUIRED PNEUMONIA OF RIGHT LOWER LOBE OF LUNG: ICD-10-CM

## 2020-11-17 DIAGNOSIS — R79.89 OTHER SPECIFIED ABNORMAL FINDINGS OF BLOOD CHEMISTRY: ICD-10-CM

## 2020-11-17 DIAGNOSIS — Z79.82 ENCOUNTER FOR LONG-TERM (CURRENT) USE OF ASPIRIN: ICD-10-CM

## 2020-11-17 DIAGNOSIS — Z79.899 NEED FOR PROPHYLACTIC CHEMOTHERAPY: ICD-10-CM

## 2020-11-17 DIAGNOSIS — I73.00 RAYNAUD'S DISEASE WITHOUT GANGRENE: ICD-10-CM

## 2020-11-17 DIAGNOSIS — R55 VASOVAGAL SYNCOPE: ICD-10-CM

## 2020-11-17 DIAGNOSIS — N18.30 STAGE 3 CHRONIC KIDNEY DISEASE, UNSPECIFIED WHETHER STAGE 3A OR 3B CKD (H): ICD-10-CM

## 2020-11-17 DIAGNOSIS — I50.9 HEART FAILURE, UNSPECIFIED HF CHRONICITY, UNSPECIFIED HEART FAILURE TYPE (H): ICD-10-CM

## 2020-11-17 DIAGNOSIS — J18.9 UNRESOLVED PNEUMONIA: ICD-10-CM

## 2020-11-17 DIAGNOSIS — E78.5 HYPERLIPIDEMIA, UNSPECIFIED HYPERLIPIDEMIA TYPE: ICD-10-CM

## 2020-11-17 LAB
ALBUMIN SERPL-MCNC: 3.8 G/DL (ref 3.5–5.7)
ALBUMIN UR-MCNC: 100 MG/DL
ALP SERPL-CCNC: 59 U/L (ref 34–104)
ALT SERPL W P-5'-P-CCNC: 21 U/L (ref 7–52)
ANION GAP SERPL CALCULATED.3IONS-SCNC: 12 MMOL/L (ref 3–14)
APPEARANCE UR: ABNORMAL
AST SERPL W P-5'-P-CCNC: 27 U/L (ref 13–39)
BACTERIA #/AREA URNS HPF: ABNORMAL /HPF
BASOPHILS # BLD AUTO: 0 10E9/L (ref 0–0.2)
BASOPHILS NFR BLD AUTO: 0.3 %
BILIRUB SERPL-MCNC: 0.6 MG/DL (ref 0.3–1)
BILIRUB UR QL STRIP: NEGATIVE
BUN SERPL-MCNC: 24 MG/DL (ref 7–25)
CALCIUM SERPL-MCNC: 8.8 MG/DL (ref 8.6–10.3)
CHLORIDE SERPL-SCNC: 95 MMOL/L (ref 98–107)
CO2 SERPL-SCNC: 31 MMOL/L (ref 21–31)
COLOR UR AUTO: YELLOW
CREAT SERPL-MCNC: 1.87 MG/DL (ref 0.6–1.2)
CRP SERPL-MCNC: 3.1 MG/L
DIFFERENTIAL METHOD BLD: ABNORMAL
EOSINOPHIL # BLD AUTO: 0.1 10E9/L (ref 0–0.7)
EOSINOPHIL NFR BLD AUTO: 0.6 %
ERYTHROCYTE [DISTWIDTH] IN BLOOD BY AUTOMATED COUNT: 13.3 % (ref 10–15)
GFR SERPL CREATININE-BSD FRML MDRD: 26 ML/MIN/{1.73_M2}
GLUCOSE SERPL-MCNC: 124 MG/DL (ref 70–105)
GLUCOSE UR STRIP-MCNC: NEGATIVE MG/DL
HCT VFR BLD AUTO: 36.5 % (ref 35–47)
HGB BLD-MCNC: 12.7 G/DL (ref 11.7–15.7)
HGB UR QL STRIP: ABNORMAL
HYALINE CASTS #/AREA URNS LPF: 6 /LPF (ref 0–2)
IMM GRANULOCYTES # BLD: 0 10E9/L (ref 0–0.4)
IMM GRANULOCYTES NFR BLD: 0.2 %
INR PPP: 1.05 (ref 0.86–1.14)
KETONES UR STRIP-MCNC: 5 MG/DL
LACTATE BLD-SCNC: 1.3 MMOL/L (ref 0.7–2)
LEUKOCYTE ESTERASE UR QL STRIP: ABNORMAL
LYMPHOCYTES # BLD AUTO: 2.7 10E9/L (ref 0.8–5.3)
LYMPHOCYTES NFR BLD AUTO: 20 %
MCH RBC QN AUTO: 30.2 PG (ref 26.5–33)
MCHC RBC AUTO-ENTMCNC: 34.8 G/DL (ref 31.5–36.5)
MCV RBC AUTO: 87 FL (ref 78–100)
MONOCYTES # BLD AUTO: 0.8 10E9/L (ref 0–1.3)
MONOCYTES NFR BLD AUTO: 5.9 %
MUCOUS THREADS #/AREA URNS LPF: PRESENT /LPF
NEUTROPHILS # BLD AUTO: 9.8 10E9/L (ref 1.6–8.3)
NEUTROPHILS NFR BLD AUTO: 73 %
NITRATE UR QL: POSITIVE
NT-PROBNP SERPL-MCNC: 59 PG/ML (ref 0–100)
PH UR STRIP: 6 PH (ref 5–7)
PLATELET # BLD AUTO: 323 10E9/L (ref 150–450)
POTASSIUM SERPL-SCNC: 2.3 MMOL/L (ref 3.5–5.1)
POTASSIUM SERPL-SCNC: 2.4 MMOL/L (ref 3.5–5.1)
PROCALCITONIN SERPL-MCNC: <0.5 NG/ML
PROT SERPL-MCNC: 6.8 G/DL (ref 6.4–8.9)
RBC # BLD AUTO: 4.2 10E12/L (ref 3.8–5.2)
RBC #/AREA URNS AUTO: 2 /HPF (ref 0–2)
SODIUM SERPL-SCNC: 138 MMOL/L (ref 134–144)
SOURCE: ABNORMAL
SP GR UR STRIP: 1.02 (ref 1–1.03)
SQUAMOUS #/AREA URNS AUTO: 1 /HPF (ref 0–1)
TROPONIN I SERPL-MCNC: 45.6 PG/ML
TROPONIN I SERPL-MCNC: 49.2 PG/ML
UROBILINOGEN UR STRIP-MCNC: NORMAL MG/DL (ref 0–2)
WBC # BLD AUTO: 13.4 10E9/L (ref 4–11)
WBC #/AREA URNS AUTO: >182 /HPF (ref 0–5)

## 2020-11-17 PROCEDURE — 84132 ASSAY OF SERUM POTASSIUM: CPT | Performed by: PHYSICIAN ASSISTANT

## 2020-11-17 PROCEDURE — 36415 COLL VENOUS BLD VENIPUNCTURE: CPT | Performed by: PHYSICIAN ASSISTANT

## 2020-11-17 PROCEDURE — 80053 COMPREHEN METABOLIC PANEL: CPT | Performed by: PHYSICIAN ASSISTANT

## 2020-11-17 PROCEDURE — 85610 PROTHROMBIN TIME: CPT | Performed by: PHYSICIAN ASSISTANT

## 2020-11-17 PROCEDURE — 87086 URINE CULTURE/COLONY COUNT: CPT | Performed by: PHYSICIAN ASSISTANT

## 2020-11-17 PROCEDURE — C9803 HOPD COVID-19 SPEC COLLECT: HCPCS | Performed by: PHYSICIAN ASSISTANT

## 2020-11-17 PROCEDURE — 99285 EMERGENCY DEPT VISIT HI MDM: CPT | Performed by: PHYSICIAN ASSISTANT

## 2020-11-17 PROCEDURE — 84484 ASSAY OF TROPONIN QUANT: CPT | Performed by: PHYSICIAN ASSISTANT

## 2020-11-17 PROCEDURE — 83880 ASSAY OF NATRIURETIC PEPTIDE: CPT | Performed by: PHYSICIAN ASSISTANT

## 2020-11-17 PROCEDURE — 250N000011 HC RX IP 250 OP 636: Performed by: FAMILY MEDICINE

## 2020-11-17 PROCEDURE — 81001 URINALYSIS AUTO W/SCOPE: CPT | Performed by: PHYSICIAN ASSISTANT

## 2020-11-17 PROCEDURE — 84145 PROCALCITONIN (PCT): CPT | Performed by: PHYSICIAN ASSISTANT

## 2020-11-17 PROCEDURE — 93005 ELECTROCARDIOGRAM TRACING: CPT | Performed by: PHYSICIAN ASSISTANT

## 2020-11-17 PROCEDURE — 96365 THER/PROPH/DIAG IV INF INIT: CPT | Performed by: PHYSICIAN ASSISTANT

## 2020-11-17 PROCEDURE — 250N000011 HC RX IP 250 OP 636: Performed by: PHYSICIAN ASSISTANT

## 2020-11-17 PROCEDURE — 96368 THER/DIAG CONCURRENT INF: CPT | Performed by: PHYSICIAN ASSISTANT

## 2020-11-17 PROCEDURE — 99285 EMERGENCY DEPT VISIT HI MDM: CPT | Mod: 25 | Performed by: PHYSICIAN ASSISTANT

## 2020-11-17 PROCEDURE — 87088 URINE BACTERIA CULTURE: CPT | Performed by: PHYSICIAN ASSISTANT

## 2020-11-17 PROCEDURE — 86140 C-REACTIVE PROTEIN: CPT | Performed by: PHYSICIAN ASSISTANT

## 2020-11-17 PROCEDURE — 258N000003 HC RX IP 258 OP 636: Performed by: PHYSICIAN ASSISTANT

## 2020-11-17 PROCEDURE — 96366 THER/PROPH/DIAG IV INF ADDON: CPT | Performed by: PHYSICIAN ASSISTANT

## 2020-11-17 PROCEDURE — 71045 X-RAY EXAM CHEST 1 VIEW: CPT

## 2020-11-17 PROCEDURE — U0003 INFECTIOUS AGENT DETECTION BY NUCLEIC ACID (DNA OR RNA); SEVERE ACUTE RESPIRATORY SYNDROME CORONAVIRUS 2 (SARS-COV-2) (CORONAVIRUS DISEASE [COVID-19]), AMPLIFIED PROBE TECHNIQUE, MAKING USE OF HIGH THROUGHPUT TECHNOLOGIES AS DESCRIBED BY CMS-2020-01-R: HCPCS | Performed by: PHYSICIAN ASSISTANT

## 2020-11-17 PROCEDURE — 83605 ASSAY OF LACTIC ACID: CPT | Performed by: PHYSICIAN ASSISTANT

## 2020-11-17 PROCEDURE — 93010 ELECTROCARDIOGRAM REPORT: CPT | Performed by: INTERNAL MEDICINE

## 2020-11-17 PROCEDURE — 85025 COMPLETE CBC W/AUTO DIFF WBC: CPT | Performed by: PHYSICIAN ASSISTANT

## 2020-11-17 PROCEDURE — 120N000001 HC R&B MED SURG/OB

## 2020-11-17 RX ORDER — POTASSIUM CHLORIDE 7.45 MG/ML
10 INJECTION INTRAVENOUS
Status: COMPLETED | OUTPATIENT
Start: 2020-11-17 | End: 2020-11-18

## 2020-11-17 RX ORDER — SODIUM CHLORIDE 9 MG/ML
INJECTION, SOLUTION INTRAVENOUS CONTINUOUS
Status: DISCONTINUED | OUTPATIENT
Start: 2020-11-17 | End: 2020-11-18

## 2020-11-17 RX ORDER — LEVOFLOXACIN 5 MG/ML
750 INJECTION, SOLUTION INTRAVENOUS ONCE
Status: COMPLETED | OUTPATIENT
Start: 2020-11-17 | End: 2020-11-18

## 2020-11-17 RX ORDER — LEVOFLOXACIN 5 MG/ML
250 INJECTION, SOLUTION INTRAVENOUS EVERY 24 HOURS
Status: DISCONTINUED | OUTPATIENT
Start: 2020-11-18 | End: 2020-11-19 | Stop reason: HOSPADM

## 2020-11-17 RX ADMIN — POTASSIUM CHLORIDE 10 MEQ: 7.46 INJECTION, SOLUTION INTRAVENOUS at 21:58

## 2020-11-17 RX ADMIN — SODIUM CHLORIDE 1000 ML: 9 INJECTION, SOLUTION INTRAVENOUS at 20:38

## 2020-11-17 RX ADMIN — SODIUM CHLORIDE: 9 INJECTION, SOLUTION INTRAVENOUS at 21:59

## 2020-11-17 RX ADMIN — LEVOFLOXACIN 750 MG: 5 INJECTION, SOLUTION INTRAVENOUS at 22:31

## 2020-11-17 RX ADMIN — POTASSIUM CHLORIDE 10 MEQ: 7.46 INJECTION, SOLUTION INTRAVENOUS at 23:03

## 2020-11-17 ASSESSMENT — MIFFLIN-ST. JEOR: SCORE: 1205.76

## 2020-11-17 ASSESSMENT — ENCOUNTER SYMPTOMS
BACK PAIN: 0
COUGH: 0
STRIDOR: 0
SORE THROAT: 0
NAUSEA: 0
WEAKNESS: 0
COLOR CHANGE: 0
RHINORRHEA: 0
VOMITING: 0
DYSURIA: 0
TROUBLE SWALLOWING: 0
SPEECH DIFFICULTY: 0
DIZZINESS: 0
DIARRHEA: 0
WHEEZING: 0
APPETITE CHANGE: 0
SHORTNESS OF BREATH: 0
FREQUENCY: 0
CHEST TIGHTNESS: 0
NECK PAIN: 0
LIGHT-HEADEDNESS: 0
FATIGUE: 0
FEVER: 0
FACIAL SWELLING: 0
ABDOMINAL PAIN: 0
CONSTIPATION: 0
HEADACHES: 0
TREMORS: 0
NECK STIFFNESS: 0
ACTIVITY CHANGE: 0
FACIAL ASYMMETRY: 0
EYE PAIN: 0
SEIZURES: 0

## 2020-11-18 LAB
ALBUMIN SERPL-MCNC: 3.4 G/DL (ref 3.5–5.7)
ALP SERPL-CCNC: 52 U/L (ref 34–104)
ALT SERPL W P-5'-P-CCNC: 16 U/L (ref 7–52)
ANION GAP SERPL CALCULATED.3IONS-SCNC: 9 MMOL/L (ref 3–14)
AST SERPL W P-5'-P-CCNC: 24 U/L (ref 13–39)
BILIRUB SERPL-MCNC: 0.6 MG/DL (ref 0.3–1)
BUN SERPL-MCNC: 19 MG/DL (ref 7–25)
CALCIUM SERPL-MCNC: 8.2 MG/DL (ref 8.6–10.3)
CHLORIDE SERPL-SCNC: 103 MMOL/L (ref 98–107)
CO2 SERPL-SCNC: 30 MMOL/L (ref 21–31)
CREAT SERPL-MCNC: 1.53 MG/DL (ref 0.6–1.2)
GFR SERPL CREATININE-BSD FRML MDRD: 33 ML/MIN/{1.73_M2}
GLUCOSE SERPL-MCNC: 90 MG/DL (ref 70–105)
INTERPRETATION ECG - MUSE: NORMAL
POTASSIUM SERPL-SCNC: 2.7 MMOL/L (ref 3.5–5.1)
POTASSIUM SERPL-SCNC: 2.9 MMOL/L (ref 3.5–5.1)
POTASSIUM SERPL-SCNC: 3 MMOL/L (ref 3.5–5.1)
PROT SERPL-MCNC: 5.8 G/DL (ref 6.4–8.9)
SARS-COV-2 RNA SPEC QL NAA+PROBE: NORMAL
SODIUM SERPL-SCNC: 142 MMOL/L (ref 134–144)
SPECIMEN SOURCE: NORMAL
TROPONIN I SERPL-MCNC: 33.2 PG/ML

## 2020-11-18 PROCEDURE — 36415 COLL VENOUS BLD VENIPUNCTURE: CPT | Performed by: FAMILY MEDICINE

## 2020-11-18 PROCEDURE — 36415 COLL VENOUS BLD VENIPUNCTURE: CPT | Performed by: PHYSICIAN ASSISTANT

## 2020-11-18 PROCEDURE — 84132 ASSAY OF SERUM POTASSIUM: CPT | Performed by: FAMILY MEDICINE

## 2020-11-18 PROCEDURE — 84484 ASSAY OF TROPONIN QUANT: CPT | Performed by: PHYSICIAN ASSISTANT

## 2020-11-18 PROCEDURE — 120N000001 HC R&B MED SURG/OB

## 2020-11-18 PROCEDURE — 250N000013 HC RX MED GY IP 250 OP 250 PS 637: Performed by: FAMILY MEDICINE

## 2020-11-18 PROCEDURE — 258N000003 HC RX IP 258 OP 636: Performed by: PHYSICIAN ASSISTANT

## 2020-11-18 PROCEDURE — 99222 1ST HOSP IP/OBS MODERATE 55: CPT | Performed by: FAMILY MEDICINE

## 2020-11-18 PROCEDURE — 258N000003 HC RX IP 258 OP 636: Performed by: FAMILY MEDICINE

## 2020-11-18 PROCEDURE — 250N000011 HC RX IP 250 OP 636: Performed by: FAMILY MEDICINE

## 2020-11-18 PROCEDURE — 80053 COMPREHEN METABOLIC PANEL: CPT | Performed by: PHYSICIAN ASSISTANT

## 2020-11-18 RX ORDER — FUROSEMIDE 40 MG
40 TABLET ORAL DAILY
Status: DISCONTINUED | OUTPATIENT
Start: 2020-11-18 | End: 2020-11-19 | Stop reason: HOSPADM

## 2020-11-18 RX ORDER — SODIUM CHLORIDE 9 MG/ML
INJECTION, SOLUTION INTRAVENOUS CONTINUOUS
Status: DISCONTINUED | OUTPATIENT
Start: 2020-11-18 | End: 2020-11-19 | Stop reason: HOSPADM

## 2020-11-18 RX ORDER — POTASSIUM CHLORIDE 1500 MG/1
40 TABLET, EXTENDED RELEASE ORAL ONCE
Status: COMPLETED | OUTPATIENT
Start: 2020-11-18 | End: 2020-11-18

## 2020-11-18 RX ORDER — METOPROLOL SUCCINATE 50 MG/1
50 TABLET, EXTENDED RELEASE ORAL DAILY
Status: DISCONTINUED | OUTPATIENT
Start: 2020-11-18 | End: 2020-11-19 | Stop reason: HOSPADM

## 2020-11-18 RX ORDER — ACETAMINOPHEN 325 MG/1
650 TABLET ORAL EVERY 4 HOURS PRN
Status: DISCONTINUED | OUTPATIENT
Start: 2020-11-18 | End: 2020-11-19 | Stop reason: HOSPADM

## 2020-11-18 RX ORDER — ASPIRIN 81 MG/1
81 TABLET ORAL DAILY
Status: DISCONTINUED | OUTPATIENT
Start: 2020-11-18 | End: 2020-11-19 | Stop reason: HOSPADM

## 2020-11-18 RX ORDER — AMLODIPINE BESYLATE 2.5 MG/1
2.5 TABLET ORAL DAILY
Status: DISCONTINUED | OUTPATIENT
Start: 2020-11-18 | End: 2020-11-19 | Stop reason: HOSPADM

## 2020-11-18 RX ORDER — SODIUM PHOSPHATE,MONO-DIBASIC 19G-7G/118
1 ENEMA (ML) RECTAL 2 TIMES DAILY
COMMUNITY
End: 2022-08-01 | Stop reason: ALTCHOICE

## 2020-11-18 RX ORDER — POTASSIUM CHLORIDE 1500 MG/1
40 TABLET, EXTENDED RELEASE ORAL ONCE
Status: COMPLETED | OUTPATIENT
Start: 2020-11-19 | End: 2020-11-18

## 2020-11-18 RX ORDER — CHLORAL HYDRATE 500 MG
2 CAPSULE ORAL 2 TIMES DAILY
COMMUNITY

## 2020-11-18 RX ORDER — SPIRONOLACTONE 25 MG
12.5 TABLET ORAL DAILY
Status: DISCONTINUED | OUTPATIENT
Start: 2020-11-18 | End: 2020-11-19 | Stop reason: HOSPADM

## 2020-11-18 RX ORDER — LIDOCAINE 40 MG/G
CREAM TOPICAL
Status: DISCONTINUED | OUTPATIENT
Start: 2020-11-18 | End: 2020-11-19 | Stop reason: HOSPADM

## 2020-11-18 RX ADMIN — LEVOFLOXACIN 250 MG: 5 INJECTION, SOLUTION INTRAVENOUS at 23:29

## 2020-11-18 RX ADMIN — SODIUM CHLORIDE: 9 INJECTION, SOLUTION INTRAVENOUS at 06:57

## 2020-11-18 RX ADMIN — Medication: at 23:34

## 2020-11-18 RX ADMIN — POTASSIUM CHLORIDE 40 MEQ: 1500 TABLET, EXTENDED RELEASE ORAL at 23:38

## 2020-11-18 RX ADMIN — AMLODIPINE BESYLATE 2.5 MG: 2.5 TABLET ORAL at 09:54

## 2020-11-18 RX ADMIN — SPIRONOLACTONE 12.5 MG: 25 TABLET ORAL at 09:49

## 2020-11-18 RX ADMIN — FUROSEMIDE 40 MG: 40 TABLET ORAL at 09:54

## 2020-11-18 RX ADMIN — Medication: at 14:12

## 2020-11-18 RX ADMIN — POTASSIUM CHLORIDE 40 MEQ: 1500 TABLET, EXTENDED RELEASE ORAL at 16:05

## 2020-11-18 RX ADMIN — POTASSIUM CHLORIDE 10 MEQ: 7.46 INJECTION, SOLUTION INTRAVENOUS at 00:03

## 2020-11-18 RX ADMIN — POTASSIUM CHLORIDE 40 MEQ: 1500 TABLET, EXTENDED RELEASE ORAL at 09:49

## 2020-11-18 RX ADMIN — METOPROLOL SUCCINATE 50 MG: 50 TABLET, EXTENDED RELEASE ORAL at 09:54

## 2020-11-18 RX ADMIN — ASPIRIN 81 MG: 81 TABLET, DELAYED RELEASE ORAL at 09:48

## 2020-11-18 RX ADMIN — Medication 150 MG: at 11:39

## 2020-11-18 RX ADMIN — POTASSIUM CHLORIDE 10 MEQ: 7.46 INJECTION, SOLUTION INTRAVENOUS at 02:02

## 2020-11-18 ASSESSMENT — ACTIVITIES OF DAILY LIVING (ADL)
ADLS_ACUITY_SCORE: 15
WHICH_OF_THE_ABOVE_FUNCTIONAL_RISKS_HAD_A_RECENT_ONSET_OR_CHANGE?: AMBULATION
ADLS_ACUITY_SCORE: 15

## 2020-11-18 ASSESSMENT — MIFFLIN-ST. JEOR: SCORE: 1213.47

## 2020-11-18 NOTE — ED NOTES
3rd bag of potassium hung, staff cycled patients BP due to a reading of 80 systolic.  Upon rechecking patients BP results were 91/53, provider made aware of patients vitals.  Patient denies feeling light headed/dizzy.  Report given to Gisela JONES.

## 2020-11-18 NOTE — H&P
"[facility]    History and Physical  Hospitalist       Date of Admission:  11/17/2020    Assessment & Plan   Hailey Shah is a 74 year old female who presents with weakness, fall    Principal Problem:    Vasovagal syncope    Assessment: new    Plan: telemetry, correct potassium and treat infection.    Active Problems:    CKD (chronic kidney disease) stage 3, GFR 30-59 ml/min    Assessment: acute on chronic    Plan: monitor, IVF      Essential hypertension    Assessment: stable    Plan: continue current regimen      Idiopathic cardiomyopathy (H)    Assessment: chronic    Plan: continue regimen      Obesity    Assessment: chronic      Hypokalemia    Assessment: new    Plan: replace and monitor      Elevated troponin    Assessment: new    Plan: discussed with cardiology,. Does not appear to be ACS and likely related to kidney injury, hypokalemia etc      Acute cystitis with hematuria    Assessment: new    Plan: continue levaquin, await culture      Community acquired pneumonia of right lower lobe of lung    Assessment: new, no additional O2 needed    Plan: levaquin    DVT Prophylaxis: Ambulate every shift  Code Status: Full Code    Jean Corrales    Primary Care Physician   Roxana Maki    Chief Complaint   Syncopal episode    History is obtained from the patient and chart review.    History of Present Illness   Hailey Shah is a 74 year old female who presents with syncopal episode yesterday.  She reports going upstairs and feeling weak, lightheaded and having a syncopal episode yesterday.  She was brought to the ED where she was found to have elevated troponin, UTI, potential RLL pneumonia.  Case discussed with cardiology who recommended no further cardiac workup.    This morning she is feeling much better and \"clearer.\"  No CP, SOB, dizziness, chest pain, fever, nausea, or vomiting this morning.    She fell several weeks ago and was found to have fractured left 6th rib, she has been " minimally active since then due to discomfort.    Past Medical History    I have reviewed this patient's medical history and updated it with pertinent information if needed.   Past Medical History:   Diagnosis Date     Chronic diastolic heart failure (H)     2014,ECHO 10/30/2013 - Final Impressions:  1. Mild left ventricular enlargement with a mild decrease in systolic function, estimated ejection fraction 45%.  2. Mild right ventricular enlargement with normal systolic function.  3. Mild left atrial enlargement.  4. Mild mitral regurgitation.  5. Trace tricuspid regurgitation.  6. Mild left ventricular diastolic dysfunction.  7. When compared t*     Chronic kidney disease, stage III (moderate)     2014     Essential (primary) hypertension     No Comments Provided     Hyperlipidemia     No Comments Provided     Low back pain     Chronic low back pain secondary to injury, , with chronic pain syndrome.     Other cardiomyopathies (CODE)     2013,Diagnosed by angio and cath study 2013 at [facility]. EF of 35% at first study     Personal history of other medical treatment (CODE)      with all vaginal deliveries     Raynaud's syndrome without gangrene     No Comments Provided     Tinnitus     No Comments Provided     Ventricular premature depolarization     3/25/2013     Ventricular tachycardia (H)     3/4/2014       Past Surgical History   I have reviewed this patient's surgical history and updated it with pertinent information if needed.  Past Surgical History:   Procedure Laterality Date     ARTHROSCOPY SHOULDER Right     Shoulder surgery for impingement     ARTHROSCOPY SHOULDER Left      Left shoulder surgery for impingement     ARTHROSCOPY SHOULDER  2009    and debridement, distal clavical excision     Cardiac ablation for pvc      not successful     COLONOSCOPY      06,Inflammation noted, no further issues     COLONOSCOPY  2016,wnl no need f/u     DILATION AND  CURETTAGE      D&C for retained placentas x2     FINGER SURGERY Left 10/1989    fourth finger, cyst removal     HYSTERECTOMY TOTAL ABDOMINAL, BILATERAL SALPINGO-OOPHORECTOMY, COMBINED      1991,Soren-Clifton procedure     MAMMOPLASTY AUGMENTATION      1983     REPAIR BLADDER      7/1999, with [facility] and Juan Miguel procedure with cystoscopy       Prior to Admission Medications   Prior to Admission Medications   Prescriptions Last Dose Informant Patient Reported? Taking?   Calcium Carbonate-Vitamin D (CALCIUM 500 + D) 500-125 MG-UNIT TABS   Yes No   Multiple Vitamin (MULTI-VITAMINS) TABS   Yes No   Sig: Take 1 tablet by mouth   amLODIPine (NORVASC) 2.5 MG tablet 11/17/2020 at Unknown time  Yes Yes   Sig: Take 2.5 mg by mouth   amiodarone (PACERONE/CODARONE) 100 MG TABS tablet 11/17/2020 at Unknown time  Yes Yes   Sig: Take 1.5 tablets (150 mg) by mouth daily Take 1 1/2 tablet daily   ascorbic acid (VITAMIN C) 500 MG tablet   Yes No   Sig: Take 500 mg by mouth   aspirin EC 81 MG EC tablet 11/17/2020 at Unknown time  Yes Yes   Sig: Take 81 mg by mouth   furosemide (LASIX) 40 MG tablet 11/17/2020 at Unknown time  Yes Yes   Sig: Take 40 mg by mouth   ibuprofen (ADVIL/MOTRIN) 600 MG tablet   No No   Sig: Take 1 tablet (600 mg) by mouth every 6 hours as needed for moderate pain   metoprolol succinate (TOPROL-XL) 50 MG 24 hr tablet 11/17/2020 at Unknown time  No Yes   Sig: TAKE 1 TABLET BY MOUTH EVERY DAY   rosuvastatin (CRESTOR) 40 MG tablet 11/17/2020 at Unknown time  No Yes   Sig: Take 1 tablet (40 mg) by mouth At Bedtime   spironolactone (ALDACTONE) 25 MG tablet 11/17/2020 at Unknown time  No Yes   Sig: TAKE 1/2 TABLET BY MOUTH DAILY      Facility-Administered Medications: None     Allergies   Allergies   Allergen Reactions     Acetaminophen-Codeine Nausea and Vomiting     Sulfa Drugs      Other reaction(s): *Unknown - Childhood Rxn       Social History   I have reviewed this patient's social history and updated it with  pertinent information if needed. Hailey Shah  reports that she has never smoked. She has never used smokeless tobacco. She reports that she does not drink alcohol or use drugs.    Family History   I have reviewed this patient's family history and updated it with pertinent information if needed.   Family History   Problem Relation Age of Onset     Heart Disease Mother         Heart Disease,CHF     Hypertension Mother         Hypertension     Other - See Comments Mother         rheumatic fever     Other - See Comments Father         Old age     Breast Cancer Sister         Cancer-breast     Heart Disease Brother         Heart Disease,MI     Blood Disease No family hx of         Blood Disease,no clotting disorders       Review of Systems     REVIEW OF SYSTEMS:    Constitutional: normal energy and appetite, no recent sick contacts  Eyes: no changes in vision  Ears, nose, mouth, throat, and face: no mouth sores, dysphagia, or odynophagia  Respiratory: no shortness of breath, cough, or wheezing. No aspiration symptoms.   Cardiovascular: no chest pain, palpitations, orthopnea, increased lower extremity edema, or syncope.   Gastrointestinal: no constipation, diarrhea, nausea, vomiting or abdominal pain.  Genitourinary: no dysuria, hematuria, urgency or frequency.   Hematologic/lymphatic: no unintentional weight loss or night sweats.  Musculoskeletal: no pain to extremities or falls.   Neurological: no new weakness, tingling, numbness.   Psychiatric: no hallucinations ordelusions.      Physical Exam   Temp: 97.8  F (36.6  C) Temp src: Oral BP: 110/54 Pulse: 56   Resp: 18 SpO2: 95 % O2 Device: None (Room air)    Vital Signs with Ranges  Temp:  [97  F (36.1  C)-98.2  F (36.8  C)] 97.8  F (36.6  C)  Pulse:  [35-68] 56  Resp:  [10-28] 18  BP: ()/(44-88) 110/54  SpO2:  [94 %-100 %] 95 %  161 lbs 11.2 oz    Constitutional: alert, sitting up in bed, non toxic  Eyes: PERRL  HEENT: EOMI  Respiratory: clear  Cardiovascular:  regular  GI: soft, NT, ND  Skin: no bruising  Musculoskeletal: trace pedal edema  Neurologic: no focal motor or sensory deficits  Psychiatric: no hallucinations    Data   Results for orders placed or performed during the hospital encounter of 11/17/20   XR Chest Port 1 View     Status: None    Narrative    PROCEDURE:  XR CHEST PORT 1 VW    HISTORY:  syncopal episode.     COMPARISON:  October 15, 2020    FINDINGS:   The cardiac silhouette is normal in size. The pulmonary vasculature is  normal.  There is been interval improvement of the left basilar  opacity from previous examination of October 15, 2020. This is a small  area of increased density at the right lung base. This is new as  compared to previous examination and is consistent with atelectasis or  pneumonia. No pleural effusion or pneumothorax.      Impression    IMPRESSION:  Right basilar opacity consistent with atelectasis or  pneumonia. Interval improvement in the left basilar opacity from  previous examination October 15, 2020      FAMILIA BEASLEY MD   CBC with platelets differential     Status: Abnormal   Result Value Ref Range    WBC 13.4 (H) 4.0 - 11.0 10e9/L    RBC Count 4.20 3.8 - 5.2 10e12/L    Hemoglobin 12.7 11.7 - 15.7 g/dL    Hematocrit 36.5 35.0 - 47.0 %    MCV 87 78 - 100 fl    MCH 30.2 26.5 - 33.0 pg    MCHC 34.8 31.5 - 36.5 g/dL    RDW 13.3 10.0 - 15.0 %    Platelet Count 323 150 - 450 10e9/L    Diff Method Automated Method     % Neutrophils 73.0 %    % Lymphocytes 20.0 %    % Monocytes 5.9 %    % Eosinophils 0.6 %    % Basophils 0.3 %    % Immature Granulocytes 0.2 %    Absolute Neutrophil 9.8 (H) 1.6 - 8.3 10e9/L    Absolute Lymphocytes 2.7 0.8 - 5.3 10e9/L    Absolute Monocytes 0.8 0.0 - 1.3 10e9/L    Absolute Eosinophils 0.1 0.0 - 0.7 10e9/L    Absolute Basophils 0.0 0.0 - 0.2 10e9/L    Abs Immature Granulocytes 0.0 0 - 0.4 10e9/L   INR     Status: None   Result Value Ref Range    INR 1.05 0.86 - 1.14   Comprehensive metabolic panel      Status: Abnormal   Result Value Ref Range    Sodium 138 134 - 144 mmol/L    Potassium 2.3 (LL) 3.5 - 5.1 mmol/L    Chloride 95 (L) 98 - 107 mmol/L    Carbon Dioxide 31 21 - 31 mmol/L    Anion Gap 12 3 - 14 mmol/L    Glucose 124 (H) 70 - 105 mg/dL    Urea Nitrogen 24 7 - 25 mg/dL    Creatinine 1.87 (H) 0.60 - 1.20 mg/dL    GFR Estimate 26 (L) >60 mL/min/[1.73_m2]    GFR Estimate If Black 32 (L) >60 mL/min/[1.73_m2]    Calcium 8.8 8.6 - 10.3 mg/dL    Bilirubin Total 0.6 0.3 - 1.0 mg/dL    Albumin 3.8 3.5 - 5.7 g/dL    Protein Total 6.8 6.4 - 8.9 g/dL    Alkaline Phosphatase 59 34 - 104 U/L    ALT 21 7 - 52 U/L    AST 27 13 - 39 U/L   Troponin GH     Status: Abnormal   Result Value Ref Range    Troponin 45.6 (H) <34.0 pg/mL   Lactic acid whole blood     Status: None   Result Value Ref Range    Lactic Acid 1.3 0.7 - 2.0 mmol/L   Nt probnp inpatient (BNP)     Status: None   Result Value Ref Range    N-Terminal Pro BNP Inpatient 59 0 - 100 pg/mL   UA reflex to Microscopic     Status: Abnormal   Result Value Ref Range    Color Urine Yellow     Appearance Urine Slightly Cloudy     Glucose Urine Negative NEG^Negative mg/dL    Bilirubin Urine Negative NEG^Negative    Ketones Urine 5 (A) NEG^Negative mg/dL    Specific Gravity Urine 1.017 1.003 - 1.035    Blood Urine Small (A) NEG^Negative    pH Urine 6.0 5.0 - 7.0 pH    Protein Albumin Urine 100 (A) NEG^Negative mg/dL    Urobilinogen mg/dL Normal 0.0 - 2.0 mg/dL    Nitrite Urine Positive (A) NEG^Negative    Leukocyte Esterase Urine Large (A) NEG^Negative    Source Midstream Urine     RBC Urine 2 0 - 2 /HPF    WBC Urine >182 (H) 0 - 5 /HPF    Bacteria Urine Many (A) NEG^Negative /HPF    Squamous Epithelial /HPF Urine 1 0 - 1 /HPF    Mucous Urine Present (A) NEG^Negative /LPF    Hyaline Casts 6 (H) 0 - 2 /LPF   CRP inflammation     Status: None   Result Value Ref Range    CRP Inflammation 3.1 <10.0 mg/L   Troponin GH     Status: Abnormal   Result Value Ref Range    Troponin  49.2 (H) <34.0 pg/mL   Potassium     Status: Abnormal   Result Value Ref Range    Potassium 2.4 (LL) 3.5 - 5.1 mmol/L   Procalcitonin     Status: None   Result Value Ref Range    Procalcitonin <0.50 ng/ml   Troponin GH     Status: None   Result Value Ref Range    Troponin 33.2 <34.0 pg/mL   Comprehensive metabolic panel     Status: Abnormal   Result Value Ref Range    Sodium 142 134 - 144 mmol/L    Potassium 2.7 (L) 3.5 - 5.1 mmol/L    Chloride 103 98 - 107 mmol/L    Carbon Dioxide 30 21 - 31 mmol/L    Anion Gap 9 3 - 14 mmol/L    Glucose 90 70 - 105 mg/dL    Urea Nitrogen 19 7 - 25 mg/dL    Creatinine 1.53 (H) 0.60 - 1.20 mg/dL    GFR Estimate 33 (L) >60 mL/min/[1.73_m2]    GFR Estimate If Black 40 (L) >60 mL/min/[1.73_m2]    Calcium 8.2 (L) 8.6 - 10.3 mg/dL    Bilirubin Total 0.6 0.3 - 1.0 mg/dL    Albumin 3.4 (L) 3.5 - 5.7 g/dL    Protein Total 5.8 (L) 6.4 - 8.9 g/dL    Alkaline Phosphatase 52 34 - 104 U/L    ALT 16 7 - 52 U/L    AST 24 13 - 39 U/L

## 2020-11-18 NOTE — PLAN OF CARE
"/46   Pulse 64   Temp 97.8  F (36.6  C) (Oral)   Resp 18   Ht 1.626 m (5' 4\")   Wt 73.3 kg (161 lb 11.2 oz)   LMP  (LMP Unknown)   SpO2 97%   BMI 27.76 kg/m    Pt is AxO x4, VSS, afebrile. Pt denies any pain. Pt does report discomfort with movement in left rib cage. Pt declines any interventions. Pt encouraged to use IS. LS noted to have fine crackles in left lower lobe. HR irregular. Trace edema noted in ankles. Adequate urine out. Pt denies SOB, denies chest pain. Pt does report dizziness on initial position change, but quickly returns \" to normal\" per pt. Pt using call light appropriately.  Positive orthostatics from sitting to standing. See flowsheet. Will continue to monitor.   "

## 2020-11-18 NOTE — ED TRIAGE NOTES
Pt states that she had a syncopal episode at home this evening. States that she broke some ribs 5 weeks ago and has not been able to eat for 1 month, over the past week she has an appetite back. Pt took her BP at home after her syncopal episode and /57 HR 38, 109/71 HR 66, and 96/55 HR 61. Pt is very concerned that her BP is low. Pt states she takes a lot of medications for her heart.

## 2020-11-18 NOTE — ED PROVIDER NOTES
History     Chief Complaint   Patient presents with     Loss of Consciousness     HPI  Hailey Shah is a 74 year old female who reports having a syncopal episode tonight which was witnessed by her family.  She reports 5 weeks ago that she fell and broke some ribs as well.  Afterwards she had difficulty eating due to the narcotics causing her to be very nauseated.  Tonight she got up to make herself something to eat and when she got near the kitchen she started to feel lightheaded as if she was going to faint she informed her family and they caught her and gradually placed her supine onto the floor.  No trauma from her fall.  Afterwards they noticed her blood pressure was significantly decreased in the 90/60 range.  She is brought in by family for further evaluation.  She is noted initially on arrival to be in [facility] and I discussed this with the patient she reports that she is on amlodipine for this.  Denies any sore throat, cough, or flulike symptoms.  No diarrhea or constipation.    Allergies:  Allergies   Allergen Reactions     Acetaminophen-Codeine Nausea and Vomiting     Sulfa Drugs      Other reaction(s): *Unknown - Childhood Rxn       Problem List:    Patient Active Problem List    Diagnosis Date Noted     Family history of ischemic heart disease 01/16/2018     Priority: Medium     Overview:   Brother had fatal myocardial infarction at age 60.       Lumbago 01/16/2018     Priority: Medium     Overview:   Chronic low back pain secondary to injury, 1995, with chronic pain syndrome.       Hyperlipidemia 08/28/2017     Priority: Medium     Overview:   IMO Update 10/11       Essential hypertension 08/28/2017     Priority: Medium     Overview:   IMO Update       PVC's (premature ventricular contractions) 12/14/2016     Priority: Medium     At risk for amiodarone toxicity with long term use 06/09/2016     Priority: Medium     Arthralgia of right hip 04/26/2016     Priority: Medium     Idiopathic cardiomyopathy  (H) 09/24/2014     Priority: Medium     CKD (chronic kidney disease) stage 3, GFR 30-59 ml/min 02/24/2014     Priority: Medium     Raynaud's disease 01/25/2012     Priority: Medium     Arthropathy of shoulder region 05/28/2010     Priority: Medium     Overview:   IMO Update 10/11       RCT (rotator cuff tear) 05/28/2010     Priority: Medium     Overview:   IMO Update 10/11       Shoulder pain 04/15/2010     Priority: Medium     Obesity 11/12/2008     Priority: Medium     Overview:   Updated per 10/1/17 IMO import       Pain in joint, lower leg 11/12/2008     Priority: Medium     Overview:   IMO Update 10/11       Raynaud's phenomenon (by history or observed) 11/12/2008     Priority: Medium     Overview:   IMO Update 10/11          Past Medical History:    Past Medical History:   Diagnosis Date     Chronic diastolic heart failure (H)      Chronic kidney disease, stage III (moderate)      Essential (primary) hypertension      Hyperlipidemia      Low back pain      Other cardiomyopathies (CODE)      Personal history of other medical treatment (CODE)      Raynaud's syndrome without gangrene      Tinnitus      Ventricular premature depolarization      Ventricular tachycardia (H)        Past Surgical History:    Past Surgical History:   Procedure Laterality Date     ARTHROSCOPY SHOULDER Right 1989    Shoulder surgery for impingement     ARTHROSCOPY SHOULDER Left 1988     Left shoulder surgery for impingement     ARTHROSCOPY SHOULDER  08/2009    and debridement, distal clavical excision     Cardiac ablation for pvc      not successful     COLONOSCOPY      4/24/06,Inflammation noted, no further issues     COLONOSCOPY  07/28/2016 7/28/2016,wnl no need f/u     DILATION AND CURETTAGE      D&C for retained placentas x2     FINGER SURGERY Left 10/1989    fourth finger, cyst removal     HYSTERECTOMY TOTAL ABDOMINAL, BILATERAL SALPINGO-OOPHORECTOMY, COMBINED      1991,Soren-Clifton procedure     MAMMOPLASTY AUGMENTATION       1983     REPAIR BLADDER      7/1999, with [facility] and Juan Miguel procedure with cystoscopy       Family History:    Family History   Problem Relation Age of Onset     Heart Disease Mother         Heart Disease,CHF     Hypertension Mother         Hypertension     Other - See Comments Mother         rheumatic fever     Other - See Comments Father         Old age     Breast Cancer Sister         Cancer-breast     Heart Disease Brother         Heart Disease,MI     Blood Disease No family hx of         Blood Disease,no clotting disorders       Social History:  Marital Status:   [2]  Social History     Tobacco Use     Smoking status: Never Smoker     Smokeless tobacco: Never Used     Tobacco comment: Quit smoking: 3-4 diet coke daily   Substance Use Topics     Alcohol use: No     Alcohol/week: 0.0 standard drinks     Drug use: Never     Comment: Drug use: No        Medications:         amiodarone (PACERONE/CODARONE) 100 MG TABS tablet       amLODIPine (NORVASC) 2.5 MG tablet       aspirin EC 81 MG EC tablet       furosemide (LASIX) 40 MG tablet       metoprolol succinate (TOPROL-XL) 50 MG 24 hr tablet       ascorbic acid (VITAMIN C) 500 MG tablet       Calcium Carbonate-Vitamin D (CALCIUM 500 + D) 500-125 MG-UNIT TABS       ibuprofen (ADVIL/MOTRIN) 600 MG tablet       Multiple Vitamin (MULTI-VITAMINS) TABS       rosuvastatin (CRESTOR) 40 MG tablet       spironolactone (ALDACTONE) 25 MG tablet          Review of Systems   Constitutional: Negative for activity change, appetite change, fatigue and fever.   HENT: Negative for drooling, facial swelling, rhinorrhea, sore throat and trouble swallowing.    Eyes: Negative for pain and visual disturbance.   Respiratory: Negative for cough, chest tightness, shortness of breath, wheezing and stridor.    Cardiovascular: Negative for chest pain and leg swelling.   Gastrointestinal: Negative for abdominal pain, constipation, diarrhea, nausea and vomiting.   Genitourinary: Negative for  "dysuria, frequency and urgency.   Musculoskeletal: Negative for back pain, neck pain and neck stiffness.   Skin: Negative for color change.   Neurological: Positive for syncope. Negative for dizziness, tremors, seizures, facial asymmetry, speech difficulty, weakness, light-headedness and headaches.       Physical Exam   BP: 126/45  Pulse: 67  Temp: 97.6  F (36.4  C)  Resp: 18  Height: 162.6 cm (5' 4\")  Weight: 72.6 kg (160 lb)  SpO2: 99 %    Vitals:    11/17/20 2025 11/17/20 2026 11/17/20 2030 11/17/20 2045   BP:   114/61 113/60   Pulse: 59 55 60 56   Resp: 10 11 13 10   Temp:       TempSrc:       SpO2: 100% 98% 97%    Weight:       Height:           Physical Exam  Constitutional:       General: She is not in acute distress.     Appearance: She is not ill-appearing, toxic-appearing or diaphoretic.   HENT:      Head: No raccoon eyes or Gary's sign.      Jaw: No trismus.      Right Ear: No drainage or tenderness.      Left Ear: No drainage or tenderness.      Nose: Nose normal.   Eyes:      General: No scleral icterus.     Extraocular Movements: Extraocular movements intact.      Right eye: Normal extraocular motion and no nystagmus.      Left eye: Normal extraocular motion and no nystagmus.      Pupils: Pupils are equal, round, and reactive to light.      Right eye: Pupil is reactive and not sluggish.      Left eye: Pupil is reactive and not sluggish.      Funduscopic exam:     Right eye: No AV nicking, arteriolar narrowing or papilledema. Red reflex present.         Left eye: No AV nicking, arteriolar narrowing or papilledema. Red reflex present.  Neck:      Musculoskeletal: Normal range of motion. Normal range of motion. No neck rigidity, pain with movement, spinous process tenderness or muscular tenderness.      Vascular: No JVD.      Trachea: No tracheal deviation.   Cardiovascular:      Rate and Rhythm: Normal rate and regular rhythm.   Pulmonary:      Effort: Pulmonary effort is normal. No respiratory " distress.      Breath sounds: Normal breath sounds. No stridor. No wheezing.      Comments: Lung sounds are clear but decreased.  SaO2 is 99% on room air.  She does not appear to be in respiratory distress.  No tachypnea.  Abdominal:      General: There is no distension.      Palpations: There is no mass.      Tenderness: There is no abdominal tenderness. There is no right CVA tenderness, left CVA tenderness, guarding or rebound.   Musculoskeletal: Normal range of motion.         General: No tenderness or deformity.   Lymphadenopathy:      Cervical: No cervical adenopathy.      Right cervical: No superficial cervical adenopathy.     Left cervical: No superficial cervical adenopathy.   Skin:     General: Skin is warm and dry.      Capillary Refill: Capillary refill takes less than 2 seconds.   Neurological:      General: No focal deficit present.      Mental Status: She is alert and oriented to person, place, and time.      GCS: GCS eye subscore is 4. GCS verbal subscore is 5. GCS motor subscore is 6.      Motor: No tremor or seizure activity.      Coordination: Coordination normal.      Gait: Gait normal.         ED Course       EKG shows a sinus rhythm with frequent PVCs in the pattern of bigeminy.  Left axis deviation.  Inferior infarct, age undetermined.  Results for orders placed or performed during the hospital encounter of 11/17/20 (from the past 24 hour(s))   CBC with platelets differential   Result Value Ref Range    WBC 13.4 (H) 4.0 - 11.0 10e9/L    RBC Count 4.20 3.8 - 5.2 10e12/L    Hemoglobin 12.7 11.7 - 15.7 g/dL    Hematocrit 36.5 35.0 - 47.0 %    MCV 87 78 - 100 fl    MCH 30.2 26.5 - 33.0 pg    MCHC 34.8 31.5 - 36.5 g/dL    RDW 13.3 10.0 - 15.0 %    Platelet Count 323 150 - 450 10e9/L    Diff Method Automated Method     % Neutrophils 73.0 %    % Lymphocytes 20.0 %    % Monocytes 5.9 %    % Eosinophils 0.6 %    % Basophils 0.3 %    % Immature Granulocytes 0.2 %    Absolute Neutrophil 9.8 (H) 1.6 - 8.3  10e9/L    Absolute Lymphocytes 2.7 0.8 - 5.3 10e9/L    Absolute Monocytes 0.8 0.0 - 1.3 10e9/L    Absolute Eosinophils 0.1 0.0 - 0.7 10e9/L    Absolute Basophils 0.0 0.0 - 0.2 10e9/L    Abs Immature Granulocytes 0.0 0 - 0.4 10e9/L   INR   Result Value Ref Range    INR 1.05 0.86 - 1.14   Comprehensive metabolic panel   Result Value Ref Range    Sodium 138 134 - 144 mmol/L    Potassium 2.3 (LL) 3.5 - 5.1 mmol/L    Chloride 95 (L) 98 - 107 mmol/L    Carbon Dioxide 31 21 - 31 mmol/L    Anion Gap 12 3 - 14 mmol/L    Glucose 124 (H) 70 - 105 mg/dL    Urea Nitrogen 24 7 - 25 mg/dL    Creatinine 1.87 (H) 0.60 - 1.20 mg/dL    GFR Estimate 26 (L) >60 mL/min/[1.73_m2]    GFR Estimate If Black 32 (L) >60 mL/min/[1.73_m2]    Calcium 8.8 8.6 - 10.3 mg/dL    Bilirubin Total 0.6 0.3 - 1.0 mg/dL    Albumin 3.8 3.5 - 5.7 g/dL    Protein Total 6.8 6.4 - 8.9 g/dL    Alkaline Phosphatase 59 34 - 104 U/L    ALT 21 7 - 52 U/L    AST 27 13 - 39 U/L   Troponin GH   Result Value Ref Range    Troponin 45.6 (H) <34.0 pg/mL   Lactic acid whole blood   Result Value Ref Range    Lactic Acid 1.3 0.7 - 2.0 mmol/L   Nt probnp inpatient (BNP)   Result Value Ref Range    N-Terminal Pro BNP Inpatient 59 0 - 100 pg/mL   CRP inflammation   Result Value Ref Range    CRP Inflammation 3.1 <10.0 mg/L   XR Chest Port 1 View    Narrative    PROCEDURE:  XR CHEST PORT 1 VW    HISTORY:  syncopal episode.     COMPARISON:  October 15, 2020    FINDINGS:   The cardiac silhouette is normal in size. The pulmonary vasculature is  normal.  There is been interval improvement of the left basilar  opacity from previous examination of October 15, 2020. This is a small  area of increased density at the right lung base. This is new as  compared to previous examination and is consistent with atelectasis or  pneumonia. No pleural effusion or pneumothorax.      Impression    IMPRESSION:  Right basilar opacity consistent with atelectasis or  pneumonia. Interval improvement in the  left basilar opacity from  previous examination October 15, 2020      FAMILIA BEASLEY MD   UA reflex to Microscopic   Result Value Ref Range    Color Urine Yellow     Appearance Urine Slightly Cloudy     Glucose Urine Negative NEG^Negative mg/dL    Bilirubin Urine Negative NEG^Negative    Ketones Urine 5 (A) NEG^Negative mg/dL    Specific Gravity Urine 1.017 1.003 - 1.035    Blood Urine Small (A) NEG^Negative    pH Urine 6.0 5.0 - 7.0 pH    Protein Albumin Urine 100 (A) NEG^Negative mg/dL    Urobilinogen mg/dL Normal 0.0 - 2.0 mg/dL    Nitrite Urine Positive (A) NEG^Negative    Leukocyte Esterase Urine Large (A) NEG^Negative    Source Midstream Urine     RBC Urine 2 0 - 2 /HPF    WBC Urine >182 (H) 0 - 5 /HPF    Bacteria Urine Many (A) NEG^Negative /HPF    Squamous Epithelial /HPF Urine 1 0 - 1 /HPF    Mucous Urine Present (A) NEG^Negative /LPF    Hyaline Casts 6 (H) 0 - 2 /LPF   Potassium   Result Value Ref Range    Potassium 2.4 (LL) 3.5 - 5.1 mmol/L   Procalcitonin   Result Value Ref Range    Procalcitonin <0.50 ng/ml   Troponin GH   Result Value Ref Range    Troponin 49.2 (H) <34.0 pg/mL       Medications   0.9% sodium chloride BOLUS (0 mLs Intravenous Stopped 11/17/20 2153)     Followed by   sodium chloride 0.9% infusion ( Intravenous New Bag 11/17/20 2159)   potassium chloride 10 mEq in 100 mL sterile water intermittent infusion (premix) (10 mEq Intravenous New Bag 11/17/20 2158)   levofloxacin (LEVAQUIN) infusion 750 mg (has no administration in time range)     Her UA returns and shows slight cloudiness with specific gravity of 1.025, pH of 6.0, glucose is negative.  She has moderate amount of blood and nitrites are positive moderate amount of leukocyte Estrace.  Microscopic shows  white blood cells and many bacteria.       I have reviewed the nursing notes.    I have reviewed the findings, diagnosis, plan and need for follow up with the patient.       ED to Inpatient Handoff:    Discussed with   Lv at [facility]  Patient accepted for Inpatient Stay  Pending studies include UC, troponin, and CBC  Code Status: Not Addressed           New Prescriptions    No medications on file       Final diagnoses:   Vasovagal syncope   Community acquired pneumonia of right lower lobe of lung   Hypokalemia   Acute cystitis with hematuria   Elevated troponin     Afebrile.  Vital signs stable.  Patient with syncopal episode earlier.  History of a fall 5 weeks ago with left-sided rib fractures.  Initial EKG shows some bigeminy but with cardiac monitoring this cleared up.  Patient reports she has a history of this and is currently on amlodipine for this.  IV established and she was given fluids.  Initial troponin returns slightly elevated at 45.6.  BNP is normal.  CBC shows elevated white blood cells at 13.4 with left shift.  Lactate remains normal.  Procalcitonin is normal as well.  CRP is normal.  INR is normal.  Her CMP returns and shows significant decrease in potassium at 2.3 and IV replacement was initiated.  Her creatinine is 1.87 and GFR is 26 this is her normal range.  Chest x-ray shows a Right basilar opacity consistent with atelectasis or pneumonia. Interval improvement in the left basilar opacity from previous examination October 15, 2020 .  Her UA returns with nitrite positive UTI as well.  She was started on Levaquin IV.  Her 90-minute troponin returns elevated as well  but only minimally at 49.2.  I discussed these findings with , cardiologist at [facility] who feels her EKG changes, infection, and decreased potassium could be causing her slight elevation in troponin levels.  He advises addressing her infection and reevaluating her troponin in the morning.  I discussed this case with  and the patient will be admitted at this time for further medical management evaluation.    11/17/2020   [facility]     Tom James PA-C  11/17/20 0321

## 2020-11-18 NOTE — PROGRESS NOTES
SAFETY CHECKLIST   ID Bands and Risk clasps correct and in place (DNR, Fall risk, Allergy, Latex, Limb):  Yes  All Lines Reconciled and labeled correctly: Yes  Whiteboard updated:Yes  Environmental interventions (bed/chair alarm on, call light, side rails, restraints, sitter....): Yes  Verify Tele #:

## 2020-11-18 NOTE — PHARMACY-ADMISSION MEDICATION HISTORY
Pharmacy -- Admission Medication Reconciliation    Prior to admission (PTA) medications were reviewed and the patient's PTA medication list was updated.    Sources Consulted: Sure Scripts, Care Everywhere, patient on telephone    The reliability of this Medication Reconciliation is: Reliability: Reliable    The following significant changes were made:  Added last doses  Removed [facility] notes  Added daily to amlodipine  Added daily to vitamin c  Added daily to asa  Added bid to calcium with d  Added daily to furosemide  Added fish oil  Added glucosamine chondroitin    In addition, the patient's allergies were reviewed with the patient and updated as follows:   Allergies: Codeine and Sulfa drugs    The pharmacist has reviewed with the patient that all personal medications should be removed from the building or locked in the belongings safe.  Patient shall only take medications ordered by the physician and administered by the nursing staff.     Medication barriers identified: none noted   Medication adherence concerns: none noted   Understanding of emergency medications: n/a    Shayy Moore [facility], 11/18/2020,  9:38 AM

## 2020-11-18 NOTE — PLAN OF CARE
Patient arrived from ER via w/c. Patient is alert orientated x4. Patient has no c/o pain at this time, is calm and cooperative. Patient informs this RN that she hopes to go home today.    Educated patient on using incentive spirometer, encouraged patient to cough and deep breathe. Patient assured this RN that she would use the call light before getting up.    Patient verbalized understanding of plan.

## 2020-11-19 VITALS
DIASTOLIC BLOOD PRESSURE: 60 MMHG | RESPIRATION RATE: 16 BRPM | BODY MASS INDEX: 28.76 KG/M2 | TEMPERATURE: 98 F | SYSTOLIC BLOOD PRESSURE: 110 MMHG | HEIGHT: 64 IN | WEIGHT: 168.43 LBS | HEART RATE: 57 BPM | OXYGEN SATURATION: 98 %

## 2020-11-19 LAB
ALBUMIN SERPL-MCNC: 3.4 G/DL (ref 3.5–5.7)
ALP SERPL-CCNC: 53 U/L (ref 34–104)
ALT SERPL W P-5'-P-CCNC: 16 U/L (ref 7–52)
ANION GAP SERPL CALCULATED.3IONS-SCNC: 7 MMOL/L (ref 3–14)
AST SERPL W P-5'-P-CCNC: 23 U/L (ref 13–39)
BACTERIA SPEC CULT: ABNORMAL
BASOPHILS # BLD AUTO: 0.1 10E9/L (ref 0–0.2)
BASOPHILS NFR BLD AUTO: 0.6 %
BILIRUB SERPL-MCNC: 0.5 MG/DL (ref 0.3–1)
BUN SERPL-MCNC: 18 MG/DL (ref 7–25)
CALCIUM SERPL-MCNC: 8.1 MG/DL (ref 8.6–10.3)
CHLORIDE SERPL-SCNC: 111 MMOL/L (ref 98–107)
CO2 SERPL-SCNC: 27 MMOL/L (ref 21–31)
CREAT SERPL-MCNC: 1.33 MG/DL (ref 0.6–1.2)
DIFFERENTIAL METHOD BLD: ABNORMAL
EOSINOPHIL # BLD AUTO: 0.2 10E9/L (ref 0–0.7)
EOSINOPHIL NFR BLD AUTO: 1.9 %
ERYTHROCYTE [DISTWIDTH] IN BLOOD BY AUTOMATED COUNT: 13.9 % (ref 10–15)
GFR SERPL CREATININE-BSD FRML MDRD: 39 ML/MIN/{1.73_M2}
GLUCOSE SERPL-MCNC: 90 MG/DL (ref 70–105)
HCT VFR BLD AUTO: 32.7 % (ref 35–47)
HGB BLD-MCNC: 10.8 G/DL (ref 11.7–15.7)
IMM GRANULOCYTES # BLD: 0 10E9/L (ref 0–0.4)
IMM GRANULOCYTES NFR BLD: 0.2 %
LABORATORY COMMENT REPORT: NORMAL
LYMPHOCYTES # BLD AUTO: 2.4 10E9/L (ref 0.8–5.3)
LYMPHOCYTES NFR BLD AUTO: 29.9 %
MCH RBC QN AUTO: 30 PG (ref 26.5–33)
MCHC RBC AUTO-ENTMCNC: 33 G/DL (ref 31.5–36.5)
MCV RBC AUTO: 91 FL (ref 78–100)
MONOCYTES # BLD AUTO: 0.5 10E9/L (ref 0–1.3)
MONOCYTES NFR BLD AUTO: 6.7 %
NEUTROPHILS # BLD AUTO: 4.9 10E9/L (ref 1.6–8.3)
NEUTROPHILS NFR BLD AUTO: 60.7 %
PLATELET # BLD AUTO: 251 10E9/L (ref 150–450)
POTASSIUM SERPL-SCNC: 3.2 MMOL/L (ref 3.5–5.1)
PROT SERPL-MCNC: 5.7 G/DL (ref 6.4–8.9)
RBC # BLD AUTO: 3.6 10E12/L (ref 3.8–5.2)
SARS-COV-2 RNA SPEC QL NAA+PROBE: NEGATIVE
SODIUM SERPL-SCNC: 145 MMOL/L (ref 134–144)
SPECIMEN SOURCE: ABNORMAL
SPECIMEN SOURCE: NORMAL
WBC # BLD AUTO: 8.1 10E9/L (ref 4–11)

## 2020-11-19 PROCEDURE — 99239 HOSP IP/OBS DSCHRG MGMT >30: CPT | Performed by: FAMILY MEDICINE

## 2020-11-19 PROCEDURE — 36415 COLL VENOUS BLD VENIPUNCTURE: CPT | Performed by: FAMILY MEDICINE

## 2020-11-19 PROCEDURE — 80053 COMPREHEN METABOLIC PANEL: CPT | Performed by: FAMILY MEDICINE

## 2020-11-19 PROCEDURE — 250N000013 HC RX MED GY IP 250 OP 250 PS 637: Performed by: FAMILY MEDICINE

## 2020-11-19 PROCEDURE — 85025 COMPLETE CBC W/AUTO DIFF WBC: CPT | Performed by: FAMILY MEDICINE

## 2020-11-19 RX ORDER — LEVOFLOXACIN 250 MG/1
250 TABLET, FILM COATED ORAL DAILY
Qty: 5 TABLET | Refills: 0 | Status: SHIPPED | OUTPATIENT
Start: 2020-11-19 | End: 2020-11-24

## 2020-11-19 RX ORDER — POTASSIUM CHLORIDE 1500 MG/1
20 TABLET, EXTENDED RELEASE ORAL ONCE
Status: COMPLETED | OUTPATIENT
Start: 2020-11-19 | End: 2020-11-19

## 2020-11-19 RX ADMIN — POTASSIUM CHLORIDE 20 MEQ: 1500 TABLET, EXTENDED RELEASE ORAL at 08:45

## 2020-11-19 RX ADMIN — SPIRONOLACTONE 12.5 MG: 25 TABLET ORAL at 10:55

## 2020-11-19 RX ADMIN — ASPIRIN 81 MG: 81 TABLET, DELAYED RELEASE ORAL at 10:55

## 2020-11-19 RX ADMIN — METOPROLOL SUCCINATE 50 MG: 50 TABLET, EXTENDED RELEASE ORAL at 10:55

## 2020-11-19 RX ADMIN — AMLODIPINE BESYLATE 2.5 MG: 2.5 TABLET ORAL at 10:55

## 2020-11-19 RX ADMIN — FUROSEMIDE 40 MG: 40 TABLET ORAL at 10:54

## 2020-11-19 RX ADMIN — Medication 150 MG: at 11:06

## 2020-11-19 ASSESSMENT — ACTIVITIES OF DAILY LIVING (ADL)
ADLS_ACUITY_SCORE: 15

## 2020-11-19 ASSESSMENT — MIFFLIN-ST. JEOR: SCORE: 1244

## 2020-11-19 NOTE — PROGRESS NOTES
Orthostatic is normal today.  No dizziness up to the bathroom.  Independent in her room.  Slept very well last night.  Replacing K again.

## 2020-11-19 NOTE — PLAN OF CARE
"Pt moved to 352, denies pain. Afebrile. O2 Sat 95% on RA. Is SBA in her room. Monitor respiratory status, maintain comfort and safety. /65   Pulse 60   Temp 97.3  F (36.3  C) (Tympanic)   Resp 16   Ht 1.626 m (5' 4\")   Wt 73.3 kg (161 lb 11.2 oz)   LMP  (LMP Unknown)   SpO2 95%   BMI 27.76 kg/m        "

## 2020-11-19 NOTE — PROGRESS NOTES
SAFETY CHECKLIST  ID Bands and Risk clasps correct and in place (DNR, Fall risk, Allergy, Latex, Limb):  Yes  All Lines Reconciled and labeled correctly: Yes  Whiteboard updated:Yes  Environmental interventions (bed/chair alarm on, call light, side rails, restraints, sitter....): Yes  Verify Tele #: [id number]

## 2020-11-19 NOTE — DISCHARGE SUMMARY
[facility]    Discharge Summary  Hospitalist    Date of Admission:  11/17/2020  Date of Discharge:  11/19/2020  Discharging Provider: Jean Corrales  Date of Service (when I saw the patient): 11/19/20    Discharge Diagnoses   Principal Problem:    Vasovagal syncope (11/17/2020)  Active Problems:    CKD (chronic kidney disease) stage 3, GFR 30-59 ml/min (2/24/2014)    Essential hypertension (8/28/2017)    Idiopathic cardiomyopathy (H) (9/24/2014)    Obesity (11/12/2008)    Hypokalemia (11/17/2020)    Elevated troponin (11/17/2020)    Acute cystitis with hematuria (11/17/2020)    Community acquired pneumonia of right lower lobe of lung (11/17/2020)      History of Present Illness   Hailey Shah is an 74 year old female who presented with syncopal episode at home.  She had hypokalemia, UTI and CXR findings suggestive of pneumonia.  Her case was discussed with cardiology who felt that her infection was likely her primary  and may have been causing some initial bigeminy.    Hospital Course   Hailey Shah was admitted on 11/17/2020.  The following problems were addressed during her hospitalization:    Vasovagal suncope   Vitals signs remained normal throughout her stay, normal orthostatics.  Telemetry was non revealing.  She was ambulating on her own and felt much improved.    UTI, RLL pneumonia   Urine culture shows GNR, susceptibilities pending.  Given clinical improvement on levaquin will switch to orals.    Hypokalemia   Historically not an issue.  Question if related to illness.  Will not send home with potassium supplements but would recheck at follow up and may need to start supplements if it decreases.    Jean Corrales    Significant Results and Procedures   Results for orders placed or performed during the hospital encounter of 11/17/20   XR Chest Port 1 View     Status: None    Narrative    PROCEDURE:  XR CHEST PORT 1 VW    HISTORY:  syncopal episode.     COMPARISON:  October 15,  2020    FINDINGS:   The cardiac silhouette is normal in size. The pulmonary vasculature is  normal.  There is been interval improvement of the left basilar  opacity from previous examination of October 15, 2020. This is a small  area of increased density at the right lung base. This is new as  compared to previous examination and is consistent with atelectasis or  pneumonia. No pleural effusion or pneumothorax.      Impression    IMPRESSION:  Right basilar opacity consistent with atelectasis or  pneumonia. Interval improvement in the left basilar opacity from  previous examination October 15, 2020      FAMILIA BEASLEY MD   CBC with platelets differential     Status: Abnormal   Result Value Ref Range    WBC 13.4 (H) 4.0 - 11.0 10e9/L    RBC Count 4.20 3.8 - 5.2 10e12/L    Hemoglobin 12.7 11.7 - 15.7 g/dL    Hematocrit 36.5 35.0 - 47.0 %    MCV 87 78 - 100 fl    MCH 30.2 26.5 - 33.0 pg    MCHC 34.8 31.5 - 36.5 g/dL    RDW 13.3 10.0 - 15.0 %    Platelet Count 323 150 - 450 10e9/L    Diff Method Automated Method     % Neutrophils 73.0 %    % Lymphocytes 20.0 %    % Monocytes 5.9 %    % Eosinophils 0.6 %    % Basophils 0.3 %    % Immature Granulocytes 0.2 %    Absolute Neutrophil 9.8 (H) 1.6 - 8.3 10e9/L    Absolute Lymphocytes 2.7 0.8 - 5.3 10e9/L    Absolute Monocytes 0.8 0.0 - 1.3 10e9/L    Absolute Eosinophils 0.1 0.0 - 0.7 10e9/L    Absolute Basophils 0.0 0.0 - 0.2 10e9/L    Abs Immature Granulocytes 0.0 0 - 0.4 10e9/L   INR     Status: None   Result Value Ref Range    INR 1.05 0.86 - 1.14   Comprehensive metabolic panel     Status: Abnormal   Result Value Ref Range    Sodium 138 134 - 144 mmol/L    Potassium 2.3 (LL) 3.5 - 5.1 mmol/L    Chloride 95 (L) 98 - 107 mmol/L    Carbon Dioxide 31 21 - 31 mmol/L    Anion Gap 12 3 - 14 mmol/L    Glucose 124 (H) 70 - 105 mg/dL    Urea Nitrogen 24 7 - 25 mg/dL    Creatinine 1.87 (H) 0.60 - 1.20 mg/dL    GFR Estimate 26 (L) >60 mL/min/[1.73_m2]    GFR Estimate If Black 32 (L)  >60 mL/min/[1.73_m2]    Calcium 8.8 8.6 - 10.3 mg/dL    Bilirubin Total 0.6 0.3 - 1.0 mg/dL    Albumin 3.8 3.5 - 5.7 g/dL    Protein Total 6.8 6.4 - 8.9 g/dL    Alkaline Phosphatase 59 34 - 104 U/L    ALT 21 7 - 52 U/L    AST 27 13 - 39 U/L   Troponin GH     Status: Abnormal   Result Value Ref Range    Troponin 45.6 (H) <34.0 pg/mL   Lactic acid whole blood     Status: None   Result Value Ref Range    Lactic Acid 1.3 0.7 - 2.0 mmol/L   Nt probnp inpatient (BNP)     Status: None   Result Value Ref Range    N-Terminal Pro BNP Inpatient 59 0 - 100 pg/mL   UA reflex to Microscopic     Status: Abnormal   Result Value Ref Range    Color Urine Yellow     Appearance Urine Slightly Cloudy     Glucose Urine Negative NEG^Negative mg/dL    Bilirubin Urine Negative NEG^Negative    Ketones Urine 5 (A) NEG^Negative mg/dL    Specific Gravity Urine 1.017 1.003 - 1.035    Blood Urine Small (A) NEG^Negative    pH Urine 6.0 5.0 - 7.0 pH    Protein Albumin Urine 100 (A) NEG^Negative mg/dL    Urobilinogen mg/dL Normal 0.0 - 2.0 mg/dL    Nitrite Urine Positive (A) NEG^Negative    Leukocyte Esterase Urine Large (A) NEG^Negative    Source Midstream Urine     RBC Urine 2 0 - 2 /HPF    WBC Urine >182 (H) 0 - 5 /HPF    Bacteria Urine Many (A) NEG^Negative /HPF    Squamous Epithelial /HPF Urine 1 0 - 1 /HPF    Mucous Urine Present (A) NEG^Negative /LPF    Hyaline Casts 6 (H) 0 - 2 /LPF   CRP inflammation     Status: None   Result Value Ref Range    CRP Inflammation 3.1 <10.0 mg/L   Troponin GH     Status: Abnormal   Result Value Ref Range    Troponin 49.2 (H) <34.0 pg/mL   Potassium     Status: Abnormal   Result Value Ref Range    Potassium 2.4 (LL) 3.5 - 5.1 mmol/L   Procalcitonin     Status: None   Result Value Ref Range    Procalcitonin <0.50 ng/ml   Symptomatic COVID-19 Virus (Coronavirus) by PCR     Status: None    Specimen: Nasopharyngeal   Result Value Ref Range    COVID-19 Virus PCR to [facility] - Source Nasopharyngeal     COVID-19 Virus  PCR to [facility] - Result       Test received-See reflex to IDDL test SARS CoV2 (COVID-19) Virus RT-PCR   Troponin GH     Status: None   Result Value Ref Range    Troponin 33.2 <34.0 pg/mL   Comprehensive metabolic panel     Status: Abnormal   Result Value Ref Range    Sodium 142 134 - 144 mmol/L    Potassium 2.7 (L) 3.5 - 5.1 mmol/L    Chloride 103 98 - 107 mmol/L    Carbon Dioxide 30 21 - 31 mmol/L    Anion Gap 9 3 - 14 mmol/L    Glucose 90 70 - 105 mg/dL    Urea Nitrogen 19 7 - 25 mg/dL    Creatinine 1.53 (H) 0.60 - 1.20 mg/dL    GFR Estimate 33 (L) >60 mL/min/[1.73_m2]    GFR Estimate If Black 40 (L) >60 mL/min/[1.73_m2]    Calcium 8.2 (L) 8.6 - 10.3 mg/dL    Bilirubin Total 0.6 0.3 - 1.0 mg/dL    Albumin 3.4 (L) 3.5 - 5.7 g/dL    Protein Total 5.8 (L) 6.4 - 8.9 g/dL    Alkaline Phosphatase 52 34 - 104 U/L    ALT 16 7 - 52 U/L    AST 24 13 - 39 U/L   Potassium     Status: Abnormal   Result Value Ref Range    Potassium 2.9 (L) 3.5 - 5.1 mmol/L   Potassium     Status: Abnormal   Result Value Ref Range    Potassium 3.0 (L) 3.5 - 5.1 mmol/L   Comprehensive metabolic panel     Status: Abnormal   Result Value Ref Range    Sodium 145 (H) 134 - 144 mmol/L    Potassium 3.2 (L) 3.5 - 5.1 mmol/L    Chloride 111 (H) 98 - 107 mmol/L    Carbon Dioxide 27 21 - 31 mmol/L    Anion Gap 7 3 - 14 mmol/L    Glucose 90 70 - 105 mg/dL    Urea Nitrogen 18 7 - 25 mg/dL    Creatinine 1.33 (H) 0.60 - 1.20 mg/dL    GFR Estimate 39 (L) >60 mL/min/[1.73_m2]    GFR Estimate If Black 47 (L) >60 mL/min/[1.73_m2]    Calcium 8.1 (L) 8.6 - 10.3 mg/dL    Bilirubin Total 0.5 0.3 - 1.0 mg/dL    Albumin 3.4 (L) 3.5 - 5.7 g/dL    Protein Total 5.7 (L) 6.4 - 8.9 g/dL    Alkaline Phosphatase 53 34 - 104 U/L    ALT 16 7 - 52 U/L    AST 23 13 - 39 U/L   CBC with platelets differential     Status: Abnormal   Result Value Ref Range    WBC 8.1 4.0 - 11.0 10e9/L    RBC Count 3.60 (L) 3.8 - 5.2 10e12/L    Hemoglobin 10.8 (L) 11.7 - 15.7 g/dL    Hematocrit 32.7  (L) 35.0 - 47.0 %    MCV 91 78 - 100 fl    MCH 30.0 26.5 - 33.0 pg    MCHC 33.0 31.5 - 36.5 g/dL    RDW 13.9 10.0 - 15.0 %    Platelet Count 251 150 - 450 10e9/L    Diff Method Automated Method     % Neutrophils 60.7 %    % Lymphocytes 29.9 %    % Monocytes 6.7 %    % Eosinophils 1.9 %    % Basophils 0.6 %    % Immature Granulocytes 0.2 %    Absolute Neutrophil 4.9 1.6 - 8.3 10e9/L    Absolute Lymphocytes 2.4 0.8 - 5.3 10e9/L    Absolute Monocytes 0.5 0.0 - 1.3 10e9/L    Absolute Eosinophils 0.2 0.0 - 0.7 10e9/L    Absolute Basophils 0.1 0.0 - 0.2 10e9/L    Abs Immature Granulocytes 0.0 0 - 0.4 10e9/L   EKG 12 lead     Status: None   Result Value Ref Range    Interpretation ECG Click View Image link to view waveform and result    Urine Culture Aerobic Bacterial     Status: Abnormal (Preliminary result)    Specimen: Urine clean catch; Midstream Urine   Result Value Ref Range    Specimen Description Midstream Urine     Culture Micro >100,000 colonies/mL  Gram negative rods   (A)         Pending Results   Unresulted Labs Ordered in the Past 30 Days of this Admission     Date and Time Order Name Status Description    11/17/2020 2301 SARS-CoV-2 COVID-19 Virus (Coronavirus) RT-PCR In process     11/17/2020 2220 Urine Culture Aerobic Bacterial Preliminary           Code Status   Full Code       Primary Care Physician   Roxana Maki    Physical Exam   Temp: 98  F (36.7  C) Temp src: Tympanic BP: 110/60 Pulse: 57   Resp: 16 SpO2: 98 % O2 Device: None (Room air)    Vitals:    11/17/20 1950 11/18/20 0113 11/19/20 0441   Weight: 72.6 kg (160 lb) 73.3 kg (161 lb 11.2 oz) 76.4 kg (168 lb 6.9 oz)     Vital Signs with Ranges  Temp:  [97.3  F (36.3  C)-98  F (36.7  C)] 98  F (36.7  C)  Pulse:  [56-60] 57  Resp:  [16-18] 16  BP: ()/(60-70) 110/60  SpO2:  [95 %-98 %] 98 %  I/O last 3 completed shifts:  In: 1899 [I.V.:1899]  Out: 1000 [Urine:1000]    Constitutional: ambulating in room well, non toxic  Eyes: PERRL,  EOMI  ENT: MMM  Respiratory: clear without wheezing  Cardiovascular: regular  GI: soft, NT, ND, +BS    Discharge Disposition   Discharged to home  Condition at discharge: Stable    Consultations This Hospital Stay   None    Time Spent on this Encounter   IJean, personally saw the patient today and spent greater than 30 minutes discharging this patient.    Discharge Orders      Reason for your hospital stay    Syncopal episode, UTI/possible pneumonia     Follow-up and recommended labs and tests     Syncopal episode, UTI and likely pneumonia     Activity    Your activity upon discharge: activity as tolerated     Diet    Follow this diet upon discharge: Orders Placed This Encounter      Regular Diet Adult     Discharge Medications   Current Discharge Medication List      START taking these medications    Details   levofloxacin (LEVAQUIN) 250 MG tablet Take 1 tablet (250 mg) by mouth daily for 5 days  Qty: 5 tablet, Refills: 0    Associated Diagnoses: Community acquired pneumonia of right lower lobe of lung; Acute cystitis with hematuria         CONTINUE these medications which have NOT CHANGED    Details   amiodarone (PACERONE/CODARONE) 100 MG TABS tablet Take 150 mg by mouth daily Take 1 and 1/2 tablet by mouth daily  Qty: 135 tablet, Refills: 3      amLODIPine (NORVASC) 2.5 MG tablet Take 2.5 mg by mouth daily       ascorbic acid (VITAMIN C) 500 MG tablet Take 500 mg by mouth daily       aspirin EC 81 MG EC tablet Take 81 mg by mouth daily       Calcium Carbonate-Vitamin D (CALCIUM 500 + D) 500-125 MG-UNIT TABS Take 1 tablet by mouth 2 times daily       fish oil-omega-3 fatty acids 1000 MG capsule Take 2 g by mouth 2 times daily      furosemide (LASIX) 40 MG tablet Take 40 mg by mouth daily       glucosamine-chondroitin 500-400 MG CAPS per capsule Take 1 capsule by mouth 2 times daily      ibuprofen (ADVIL/MOTRIN) 600 MG tablet Take 1 tablet (600 mg) by mouth every 6 hours as needed for moderate pain  Qty:  "60 tablet, Refills: 2    Associated Diagnoses: Closed fracture of multiple ribs of left side with routine healing, subsequent encounter      metoprolol succinate (TOPROL-XL) 50 MG 24 hr tablet TAKE 1 TABLET BY MOUTH EVERY DAY  Qty: 30 tablet, Refills: 0    Comments: Patient needs to be seen for refills  Associated Diagnoses: Essential hypertension      Multiple Vitamin (MULTI-VITAMINS) TABS Take 1 tablet by mouth      rosuvastatin (CRESTOR) 40 MG tablet Take 1 tablet (40 mg) by mouth At Bedtime  Qty: 90 tablet, Refills: 1    Associated Diagnoses: Mixed hyperlipidemia      spironolactone (ALDACTONE) 25 MG tablet TAKE 1/2 TABLET BY MOUTH DAILY  Qty: 45 tablet, Refills: 4    Associated Diagnoses: Essential hypertension           Allergies   Allergies   Allergen Reactions     Codeine GI Disturbance     \"gets sick\" per patient      Sulfa Drugs Unknown     Other reaction(s): *Unknown - Childhood Rxn per her mother           "

## 2020-11-19 NOTE — PHARMACY - DISCHARGE MEDICATION RECONCILIATION AND EDUCATION
Pharmacy:  Discharge Counseling and Medication Reconciliation    Hailey Shah  [address] [facility] [id number] [phone] (home)   74 year old female  PCP: Roxana Maki    Allergies: Codeine and Sulfa drugs    Discharge Counseling:    Pharmacist intern met with patient via phone today to review the medication portion of the After Visit Summary (with an emphasis on NEW medications) and to address patient's questions/concerns.    Summary of Education: Discussed levofloxacin with the patient including indication, administration, dosing, duration, and adverse effects. Discussed spacing out vitamins by two hours from antibiotic dose. No questions at this time.     Materials Provided:  MedCounselor sheets printed from Clinical Pharmacology on: Levofloxacin.     Discharge Medication Reconciliation:    It has been determined that the patient has an adequate supply of medications available or which can be obtained from the patient's preferred pharmacy, which SHE has confirmed as: [id number].     Thank you for the consult.    Ze De La Rosa, Pharmacy Intern on 11/19/2020 at 10:09 AM

## 2020-11-20 ENCOUNTER — PATIENT OUTREACH (OUTPATIENT)
Dept: CARE COORDINATION | Facility: CLINIC | Age: 75
End: 2020-11-20

## 2020-11-20 NOTE — PROGRESS NOTES
Clinic Care Coordination Contact    Transitional Care Management Phone Call    Summary of hospitalization:  [facility] discharge summary reviewed    DISCHARGE DIAGNOSIS: vasovagal syncope    DATE OF DISCHARGE: 11/19/2020    Diagnostic Tests/Treatments reviewed.  Follow up needed: 11/30/2020 f/u    Post Discharge Medication Reconciliation: discharge medications reconciled, continue medications without change.    Medications reviewed by: by myself    Problems taking medications regularly:  None    Problems adhering to non-medication therapy:  None    Other Healthcare Providers Involved in Patient s Care:         None     Update since discharge: improved.     Plan of care communicated with patient    Just a friendly reminder that you appointment is   Next 5 appointments (look out 90 days)    Nov 30, 2020  1:20 PM  Office Visit with Carissa Chapman MD  [facility] ([facility]) [address]  [place] [id number] [phone]      .  We encourage you to keep this appointment.  Please remember to bring all of your pills in their bottles (including any vitamins or over the counter pills) with you to your appointment.     The patient indicates understanding of these issues and agrees with the plan of care.   Yes    Was the patient contacted within the 2 business days or other approved timeframe?  Yes     Was the Medication reconciliation and management done since the patient was discharged? Yes    RERE Jara  11/20/2020 10:41 AM

## 2020-11-30 ENCOUNTER — OFFICE VISIT (OUTPATIENT)
Dept: FAMILY MEDICINE | Facility: OTHER | Age: 75
End: 2020-11-30
Attending: FAMILY MEDICINE
Payer: COMMERCIAL

## 2020-11-30 ENCOUNTER — TELEPHONE (OUTPATIENT)
Dept: FAMILY MEDICINE | Facility: OTHER | Age: 75
End: 2020-11-30

## 2020-11-30 ENCOUNTER — HOSPITAL ENCOUNTER (OUTPATIENT)
Dept: GENERAL RADIOLOGY | Facility: OTHER | Age: 75
End: 2020-11-30
Attending: FAMILY MEDICINE
Payer: COMMERCIAL

## 2020-11-30 VITALS
WEIGHT: 159.2 LBS | BODY MASS INDEX: 27.33 KG/M2 | HEART RATE: 62 BPM | RESPIRATION RATE: 16 BRPM | OXYGEN SATURATION: 94 % | TEMPERATURE: 97.6 F | SYSTOLIC BLOOD PRESSURE: 118 MMHG | DIASTOLIC BLOOD PRESSURE: 72 MMHG

## 2020-11-30 DIAGNOSIS — J40 BRONCHITIS: ICD-10-CM

## 2020-11-30 DIAGNOSIS — E87.6 HYPOKALEMIA: Primary | ICD-10-CM

## 2020-11-30 LAB
ANION GAP SERPL CALCULATED.3IONS-SCNC: 11 MMOL/L (ref 3–14)
BUN SERPL-MCNC: 17 MG/DL (ref 7–25)
CALCIUM SERPL-MCNC: 9.4 MG/DL (ref 8.6–10.3)
CHLORIDE SERPL-SCNC: 98 MMOL/L (ref 98–107)
CO2 SERPL-SCNC: 33 MMOL/L (ref 21–31)
CREAT SERPL-MCNC: 1.29 MG/DL (ref 0.6–1.2)
CRP SERPL-MCNC: 2.8 MG/L
ERYTHROCYTE [DISTWIDTH] IN BLOOD BY AUTOMATED COUNT: 14.1 % (ref 10–15)
GFR SERPL CREATININE-BSD FRML MDRD: 40 ML/MIN/{1.73_M2}
GLUCOSE SERPL-MCNC: 96 MG/DL (ref 70–105)
HCT VFR BLD AUTO: 36.9 % (ref 35–47)
HGB BLD-MCNC: 12.2 G/DL (ref 11.7–15.7)
MAGNESIUM SERPL-MCNC: 2.1 MG/DL (ref 1.9–2.7)
MCH RBC QN AUTO: 30 PG (ref 26.5–33)
MCHC RBC AUTO-ENTMCNC: 33.1 G/DL (ref 31.5–36.5)
MCV RBC AUTO: 91 FL (ref 78–100)
PLATELET # BLD AUTO: 191 10E9/L (ref 150–450)
POTASSIUM SERPL-SCNC: 2.8 MMOL/L (ref 3.5–5.1)
RBC # BLD AUTO: 4.07 10E12/L (ref 3.8–5.2)
SODIUM SERPL-SCNC: 142 MMOL/L (ref 134–144)
WBC # BLD AUTO: 4.6 10E9/L (ref 4–11)

## 2020-11-30 PROCEDURE — G0463 HOSPITAL OUTPT CLINIC VISIT: HCPCS | Mod: 25

## 2020-11-30 PROCEDURE — 71046 X-RAY EXAM CHEST 2 VIEWS: CPT

## 2020-11-30 PROCEDURE — 86140 C-REACTIVE PROTEIN: CPT | Mod: ZL | Performed by: FAMILY MEDICINE

## 2020-11-30 PROCEDURE — 80048 BASIC METABOLIC PNL TOTAL CA: CPT | Mod: ZL | Performed by: FAMILY MEDICINE

## 2020-11-30 PROCEDURE — 36415 COLL VENOUS BLD VENIPUNCTURE: CPT | Mod: ZL | Performed by: FAMILY MEDICINE

## 2020-11-30 PROCEDURE — 99214 OFFICE O/P EST MOD 30 MIN: CPT | Performed by: FAMILY MEDICINE

## 2020-11-30 PROCEDURE — G0463 HOSPITAL OUTPT CLINIC VISIT: HCPCS

## 2020-11-30 PROCEDURE — 85027 COMPLETE CBC AUTOMATED: CPT | Mod: ZL | Performed by: FAMILY MEDICINE

## 2020-11-30 PROCEDURE — 83735 ASSAY OF MAGNESIUM: CPT | Mod: ZL | Performed by: FAMILY MEDICINE

## 2020-11-30 RX ORDER — POTASSIUM CHLORIDE 750 MG/1
10 TABLET, EXTENDED RELEASE ORAL DAILY
Qty: 30 TABLET | Refills: 0 | Status: SHIPPED | OUTPATIENT
Start: 2020-11-30 | End: 2020-12-07

## 2020-11-30 ASSESSMENT — PAIN SCALES - GENERAL: PAINLEVEL: MILD PAIN (3)

## 2020-11-30 NOTE — PROGRESS NOTES
"Nursing Notes:   Sabina Hartman LPN  11/30/2020  1:32 PM  Signed  Patient presents to clinic following a hospital visit. Patient states she has no appetite and nothing seems to taste good. She states she has pain on her left side (ribs, hip, and leg) that comes and goes. Patient states it makes for difficulty doing daily activities. She is taking tylenol at night to get sleep.   Chief Complaint   Patient presents with     Hospital F/U     Vasovagal syncope       Initial LMP  (LMP Unknown)  Estimated body mass index is 28.91 kg/m  as calculated from the following:    Height as of 11/17/20: 1.626 m (5' 4\").    Weight as of 11/19/20: 76.4 kg (168 lb 6.9 oz).  Medication Reconciliation: complete      Sabina Hartman LPN on 11/30/2020 at 1:27 PM       SUBJECTIVE:  HPI:Hailey Shah is a 74 year old female here for hospital follow-up from 11/17-11/19 for vasovagal syncope.  She was also managed for CKD, essential hypertension, idiopathic cardiomyopathy, obesity, hypokalemia, elevated troponin, acute cystitis, and community-acquired pneumonia of right lower lobe.  Cardiology was consulted, and her infection was thought to be the primary  of her bigeminy causing syncope.  Her pneumonia was treated with Levaquin and she was switched to oral antibiotics.    Today she reports a residual productive cough. She also notes a stabbing deep pain of her right mid ribs and also feeling like water is running over her skin on that part of her chest.  Her pain is no worse with coughing.  She denies any fevers, chills, nausea, vomiting.  But she does have a decreased appetite which started around October 11 when she broke her ribs.  She completed her course of oral antibiotics, and denies any side effects.  Her Covid test was negative, but her daughter with whom she lives had a positive Covid test and was quite sick on November 17.  Patient also notes mild dizziness for 5 seconds when she lays down in bed at night.  In the " "mornings, she is very cautious, and will sit for 1 minute before getting out of bed.  She denies any lightheadedness at that time.  She also denies any palpitations or irregular heartbeats.    Allergies:  Allergies   Allergen Reactions     Codeine GI Disturbance     \"gets sick\" per patient      Sulfa Drugs Unknown     Other reaction(s): *Unknown - Childhood Rxn per her mother     ROS:  Constitutional, HEENT, cardiovascular, pulmonary, GI and  systems are negative, except as otherwise noted.    Past medical, surgical, and family history reviewed and updated as appropriate in the chart.  Relevant social history listed in HPI.    Past Medical History:   Diagnosis Date     Chronic diastolic heart failure (H)     2014,ECHO 10/30/2013 - Final Impressions:  1. Mild left ventricular enlargement with a mild decrease in systolic function, estimated ejection fraction 45%.  2. Mild right ventricular enlargement with normal systolic function.  3. Mild left atrial enlargement.  4. Mild mitral regurgitation.  5. Trace tricuspid regurgitation.  6. Mild left ventricular diastolic dysfunction.  7. When compared t*     Chronic kidney disease, stage III (moderate)     2014     Essential (primary) hypertension     No Comments Provided     Hyperlipidemia     No Comments Provided     Low back pain     Chronic low back pain secondary to injury, , with chronic pain syndrome.     Other cardiomyopathies (CODE)     2013,Diagnosed by angio and cath study 2013 at [facility]. EF of 35% at first study     Personal history of other medical treatment (CODE)      with all vaginal deliveries     Raynaud's syndrome without gangrene     No Comments Provided     Tinnitus     No Comments Provided     Ventricular premature depolarization     3/25/2013     Ventricular tachycardia (H)     3/4/2014     Past Surgical History:   Procedure Laterality Date     ARTHROSCOPY SHOULDER Right     Shoulder surgery for impingement     ARTHROSCOPY " SHOULDER Left 1988     Left shoulder surgery for impingement     ARTHROSCOPY SHOULDER  08/2009    and debridement, distal clavical excision     Cardiac ablation for pvc      not successful     COLONOSCOPY      4/24/06,Inflammation noted, no further issues     COLONOSCOPY  07/28/2016 7/28/2016,wnl no need f/u     DILATION AND CURETTAGE      D&C for retained placentas x2     FINGER SURGERY Left 10/1989    fourth finger, cyst removal     HYSTERECTOMY TOTAL ABDOMINAL, BILATERAL SALPINGO-OOPHORECTOMY, COMBINED      1991,Soren-Clifton procedure     MAMMOPLASTY AUGMENTATION      1983     REPAIR BLADDER      7/1999, with [facility] and Juan Miguel procedure with cystoscopy     Family History   Problem Relation Age of Onset     Heart Disease Mother         Heart Disease,CHF     Hypertension Mother         Hypertension     Other - See Comments Mother         rheumatic fever     Other - See Comments Father         Old age     Breast Cancer Sister         Cancer-breast     Heart Disease Brother         Heart Disease,MI     Blood Disease No family hx of         Blood Disease,no clotting disorders       Current Outpatient Medications   Medication     amiodarone (PACERONE/CODARONE) 100 MG TABS tablet     amLODIPine (NORVASC) 2.5 MG tablet     ascorbic acid (VITAMIN C) 500 MG tablet     aspirin EC 81 MG EC tablet     Calcium Carbonate-Vitamin D (CALCIUM 500 + D) 500-125 MG-UNIT TABS     fish oil-omega-3 fatty acids 1000 MG capsule     furosemide (LASIX) 40 MG tablet     glucosamine-chondroitin 500-400 MG CAPS per capsule     ibuprofen (ADVIL/MOTRIN) 600 MG tablet     metoprolol succinate (TOPROL-XL) 50 MG 24 hr tablet     Multiple Vitamin (MULTI-VITAMINS) TABS     rosuvastatin (CRESTOR) 40 MG tablet     spironolactone (ALDACTONE) 25 MG tablet     No current facility-administered medications for this visit.      Social History     Socioeconomic History     Marital status:      Spouse name: Not on file     Number of children: Not on  file     Years of education: Not on file     Highest education level: Not on file   Occupational History     Not on file   Social Needs     Financial resource strain: Not on file     Food insecurity     Worry: Not on file     Inability: Not on file     Transportation needs     Medical: Not on file     Non-medical: Not on file   Tobacco Use     Smoking status: Never Smoker     Smokeless tobacco: Never Used     Tobacco comment: Quit smoking: 3-4 diet coke daily   Substance and Sexual Activity     Alcohol use: No     Alcohol/week: 0.0 standard drinks     Drug use: Never     Comment: Drug use: No     Sexual activity: Yes     Partners: Male   Lifestyle     Physical activity     Days per week: Not on file     Minutes per session: Not on file     Stress: Not on file   Relationships     Social connections     Talks on phone: Not on file     Gets together: Not on file     Attends Restoration service: Not on file     Active member of club or organization: Not on file     Attends meetings of clubs or organizations: Not on file     Relationship status: Not on file     Intimate partner violence     Fear of current or ex partner: Not on file     Emotionally abused: Not on file     Physically abused: Not on file     Forced sexual activity: Not on file   Other Topics Concern     Not on file   Social History Narrative    . Gerardo - .  Retired from [employer] as a -retired 1/18/2012.  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    Children: Fariba Umana, 1963, Ravindra, [place], Shreya Esquivel, 1965, Dominic Dwyer, 1967, Fouzia Dwyer, 1968, Miles       Past Medical History:   Diagnosis Date     Chronic diastolic heart failure (H)     2/24/2014,ECHO 10/30/2013 - Final Impressions:  1. Mild left ventricular enlargement with a mild decrease in systolic function, estimated ejection fraction 45%.  2. Mild right ventricular enlargement with normal systolic function.  3. Mild left atrial  enlargement.  4. Mild mitral regurgitation.  5. Trace tricuspid regurgitation.  6. Mild left ventricular diastolic dysfunction.  7. When compared t*     Chronic kidney disease, stage III (moderate)     2014     Essential (primary) hypertension     No Comments Provided     Hyperlipidemia     No Comments Provided     Low back pain     Chronic low back pain secondary to injury, , with chronic pain syndrome.     Other cardiomyopathies (CODE)     2013,Diagnosed by angio and cath study 2013 at [facility]. EF of 35% at first study     Personal history of other medical treatment (CODE)      with all vaginal deliveries     Raynaud's syndrome without gangrene     No Comments Provided     Tinnitus     No Comments Provided     Ventricular premature depolarization     3/25/2013     Ventricular tachycardia (H)     3/4/2014       Past Surgical History:   Procedure Laterality Date     ARTHROSCOPY SHOULDER Right     Shoulder surgery for impingement     ARTHROSCOPY SHOULDER Left      Left shoulder surgery for impingement     ARTHROSCOPY SHOULDER  2009    and debridement, distal clavical excision     Cardiac ablation for pvc      not successful     COLONOSCOPY      06,Inflammation noted, no further issues     COLONOSCOPY  2016,wnl no need f/u     DILATION AND CURETTAGE      D&C for retained placentas x2     FINGER SURGERY Left 10/1989    fourth finger, cyst removal     HYSTERECTOMY TOTAL ABDOMINAL, BILATERAL SALPINGO-OOPHORECTOMY, COMBINED      ,Soren-Clifton procedure     MAMMOPLASTY AUGMENTATION           REPAIR BLADDER      1999, with [facility] and Juan Miguel procedure with cystoscopy       Family History   Problem Relation Age of Onset     Heart Disease Mother         Heart Disease,CHF     Hypertension Mother         Hypertension     Other - See Comments Mother         rheumatic fever     Other - See Comments Father         Old age     Breast Cancer Sister         Cancer-breast      Heart Disease Brother         Heart Disease,MI     Blood Disease No family hx of         Blood Disease,no clotting disorders       Current Outpatient Medications   Medication     amiodarone (PACERONE/CODARONE) 100 MG TABS tablet     amLODIPine (NORVASC) 2.5 MG tablet     ascorbic acid (VITAMIN C) 500 MG tablet     aspirin EC 81 MG EC tablet     Calcium Carbonate-Vitamin D (CALCIUM 500 + D) 500-125 MG-UNIT TABS     fish oil-omega-3 fatty acids 1000 MG capsule     furosemide (LASIX) 40 MG tablet     glucosamine-chondroitin 500-400 MG CAPS per capsule     ibuprofen (ADVIL/MOTRIN) 600 MG tablet     metoprolol succinate (TOPROL-XL) 50 MG 24 hr tablet     Multiple Vitamin (MULTI-VITAMINS) TABS     rosuvastatin (CRESTOR) 40 MG tablet     spironolactone (ALDACTONE) 25 MG tablet     No current facility-administered medications for this visit.        Social History     Socioeconomic History     Marital status:      Spouse name: Not on file     Number of children: Not on file     Years of education: Not on file     Highest education level: Not on file   Occupational History     Not on file   Social Needs     Financial resource strain: Not on file     Food insecurity     Worry: Not on file     Inability: Not on file     Transportation needs     Medical: Not on file     Non-medical: Not on file   Tobacco Use     Smoking status: Never Smoker     Smokeless tobacco: Never Used     Tobacco comment: Quit smoking: 3-4 diet coke daily   Substance and Sexual Activity     Alcohol use: No     Alcohol/week: 0.0 standard drinks     Drug use: Never     Comment: Drug use: No     Sexual activity: Yes     Partners: Male   Lifestyle     Physical activity     Days per week: Not on file     Minutes per session: Not on file     Stress: Not on file   Relationships     Social connections     Talks on phone: Not on file     Gets together: Not on file     Attends Islam service: Not on file     Active member of club or organization: Not on  file     Attends meetings of clubs or organizations: Not on file     Relationship status: Not on file     Intimate partner violence     Fear of current or ex partner: Not on file     Emotionally abused: Not on file     Physically abused: Not on file     Forced sexual activity: Not on file   Other Topics Concern     Not on file   Social History Narrative    . Gerardo - .  Retired from [employer] as a -retired 1/18/2012.  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    Children: Fariba Dong, 1963, Ravindra, ND, Shreya Esquivel, 1965, Tricia HongDominic, 1967, Tricia HongFouzia, 1968, Miles     OBJECTIVE:  /72 (BP Location: Right arm, Patient Position: Sitting, Cuff Size: Adult Regular)   Pulse 62   Temp 97.6  F (36.4  C) (Tympanic)   Resp 16   Wt 72.2 kg (159 lb 3.2 oz)   LMP  (LMP Unknown)   SpO2 94%   BMI 27.33 kg/m      BP Readings from Last 6 Encounters:   11/30/20 118/72   11/19/20 110/60   10/27/20 120/70   10/15/20 136/70   10/11/20 122/57   08/27/20 118/60     EXAM:  Constitutional: Alert. No acute distress. Well-groomed, well-hydrated and well-nourished.  Appears stated age.  Head: Normocephalic, atraumatic.  Eyes: EOMI, anicteric  OroPharynx: Moist mucous membranes. Dental hygiene adequate. Normal buccal mucosa. Normal pharynx.  Respiratory: Cough present. Non-labored respirations. Clear to auscultation bilaterally. Upper airway congestion. No wheezing, rhonchi, or rales.  Cardiovascular: Regular rate.  No murmur.  No lower extremity edema.  Abdominal: Soft, nontender, non-distended. No abnormal masses or organomegaly.  GI: Bowel sounds are present.  Skin: Warm, dry, intact.  No concerning rashes or lesions.  Musculoskeletal: Moves arms and legs equally and normally.  Neurologic: A+Ox3. Normal gait. No focal motor or sensory deficits. No tremor.  Psychiatric: Does not appear anxious or depressed.  Appropriate affect and insight.    CXR:  FINDINGS:    The cardiac silhouette is normal in size. The pulmonary vasculature is  normal.  There are  left-sided rib fractures again noted. There is  blunting of the left costophrenic angle. There is some linear  atelectasis or scarring seen at the left lung base.                                                             IMPRESSION:  Atelectasis or scarring at the left lung base      Office Visit on 11/30/2020   Component Date Value Ref Range Status     CRP Inflammation 11/30/2020 2.8  <10.0 mg/L Final     WBC 11/30/2020 4.6  4.0 - 11.0 10e9/L Final     RBC Count 11/30/2020 4.07  3.8 - 5.2 10e12/L Final     Hemoglobin 11/30/2020 12.2  11.7 - 15.7 g/dL Final     Hematocrit 11/30/2020 36.9  35.0 - 47.0 % Final     MCV 11/30/2020 91  78 - 100 fl Final     MCH 11/30/2020 30.0  26.5 - 33.0 pg Final     MCHC 11/30/2020 33.1  31.5 - 36.5 g/dL Final     RDW 11/30/2020 14.1  10.0 - 15.0 % Final     Platelet Count 11/30/2020 191  150 - 450 10e9/L Final     Sodium 11/30/2020 142  134 - 144 mmol/L Final     Potassium 11/30/2020 2.8* 3.5 - 5.1 mmol/L Final     Chloride 11/30/2020 98  98 - 107 mmol/L Final     Carbon Dioxide 11/30/2020 33* 21 - 31 mmol/L Final     Anion Gap 11/30/2020 11  3 - 14 mmol/L Final     Glucose 11/30/2020 96  70 - 105 mg/dL Final     Urea Nitrogen 11/30/2020 17  7 - 25 mg/dL Final     Creatinine 11/30/2020 1.29* 0.60 - 1.20 mg/dL Final     GFR Estimate 11/30/2020 40* >60 mL/min/[1.73_m2] Final     GFR Estimate If Black 11/30/2020 49* >60 mL/min/[1.73_m2] Final     Calcium 11/30/2020 9.4  8.6 - 10.3 mg/dL Final     Magnesium 11/30/2020 2.1  1.9 - 2.7 mg/dL Final       ASSESSEMENT AND PLAN:    Hospital follow-up for:    1. Hypokalemia, persistant  - Basic Metabolic Panel, K 2.8  - Magnesium- normal     -Hypokalemia persists from her hospitalization (last normal was 10/2020)  -hold Lasix 40mg daily and start potassium PO  -RTC in 1 week to recheck potassium, BP, and symptoms  -Norvasc may be increased if BP not  well controlled w/o Lasix     - potassium chloride ER (KLOR-CON M) 10 MEQ CR tablet; Take 1 tablet (10 mEq) by mouth daily  Dispense: 30 tablet; Refill: 0  - Basic Metabolic Panel; Future    2. Bronchitis- PNA has resolved  - XR Chest 2 Views  - CBC W PLT No Diff  - CRP inflammation    The majority of the 25 minute patient visit was spent counseling the patient on the above issue/s.    Carissa Chapman MD  [facility]

## 2020-11-30 NOTE — NURSING NOTE
"Patient presents to clinic following a hospital visit. Patient states she has no appetite and nothing seems to taste good. She states she has pain on her left side (ribs, hip, and leg) that comes and goes. Patient states it makes for difficulty doing daily activities. She is taking tylenol at night to get sleep.   Chief Complaint   Patient presents with     Hospital F/U     Vasovagal syncope       Initial LMP  (LMP Unknown)  Estimated body mass index is 28.91 kg/m  as calculated from the following:    Height as of 11/17/20: 1.626 m (5' 4\").    Weight as of 11/19/20: 76.4 kg (168 lb 6.9 oz).  Medication Reconciliation: clifton Hartman LPN on 11/30/2020 at 1:27 PM    "

## 2020-12-02 ENCOUNTER — TELEPHONE (OUTPATIENT)
Dept: FAMILY MEDICINE | Facility: OTHER | Age: 75
End: 2020-12-02

## 2020-12-02 NOTE — TELEPHONE ENCOUNTER
[facility]-Pt is looking for a work in on 12.07.20 on a follow up on a hospital follow up Please call and advise     Thank You    Katie Keene on 12/2/2020 at 8:14 AM

## 2020-12-07 ENCOUNTER — OFFICE VISIT (OUTPATIENT)
Dept: FAMILY MEDICINE | Facility: OTHER | Age: 75
End: 2020-12-07
Attending: FAMILY MEDICINE
Payer: COMMERCIAL

## 2020-12-07 VITALS
RESPIRATION RATE: 18 BRPM | HEART RATE: 60 BPM | HEIGHT: 64 IN | TEMPERATURE: 97.7 F | BODY MASS INDEX: 26.46 KG/M2 | SYSTOLIC BLOOD PRESSURE: 122 MMHG | OXYGEN SATURATION: 99 % | DIASTOLIC BLOOD PRESSURE: 66 MMHG | WEIGHT: 155 LBS

## 2020-12-07 DIAGNOSIS — N18.32 STAGE 3B CHRONIC KIDNEY DISEASE (H): ICD-10-CM

## 2020-12-07 DIAGNOSIS — E87.6 HYPOKALEMIA: Primary | ICD-10-CM

## 2020-12-07 DIAGNOSIS — E87.6 HYPOKALEMIA: ICD-10-CM

## 2020-12-07 DIAGNOSIS — I42.9 IDIOPATHIC CARDIOMYOPATHY (H): ICD-10-CM

## 2020-12-07 DIAGNOSIS — J18.9 PNEUMONIA OF RIGHT LOWER LOBE DUE TO INFECTIOUS ORGANISM: ICD-10-CM

## 2020-12-07 DIAGNOSIS — N30.01 ACUTE CYSTITIS WITH HEMATURIA: ICD-10-CM

## 2020-12-07 LAB
ANION GAP SERPL CALCULATED.3IONS-SCNC: 12 MMOL/L (ref 3–14)
BUN SERPL-MCNC: 22 MG/DL (ref 7–25)
CALCIUM SERPL-MCNC: 9.7 MG/DL (ref 8.6–10.3)
CHLORIDE SERPL-SCNC: 100 MMOL/L (ref 98–107)
CO2 SERPL-SCNC: 30 MMOL/L (ref 21–31)
CREAT SERPL-MCNC: 1.5 MG/DL (ref 0.6–1.2)
GFR SERPL CREATININE-BSD FRML MDRD: 34 ML/MIN/{1.73_M2}
GLUCOSE SERPL-MCNC: 113 MG/DL (ref 70–105)
POTASSIUM SERPL-SCNC: 2.9 MMOL/L (ref 3.5–5.1)
SODIUM SERPL-SCNC: 142 MMOL/L (ref 134–144)

## 2020-12-07 PROCEDURE — 99214 OFFICE O/P EST MOD 30 MIN: CPT | Performed by: FAMILY MEDICINE

## 2020-12-07 PROCEDURE — 80048 BASIC METABOLIC PNL TOTAL CA: CPT | Mod: ZL | Performed by: FAMILY MEDICINE

## 2020-12-07 PROCEDURE — G0463 HOSPITAL OUTPT CLINIC VISIT: HCPCS

## 2020-12-07 PROCEDURE — 36415 COLL VENOUS BLD VENIPUNCTURE: CPT | Mod: ZL | Performed by: FAMILY MEDICINE

## 2020-12-07 RX ORDER — POTASSIUM CHLORIDE 1500 MG/1
20 TABLET, EXTENDED RELEASE ORAL 2 TIMES DAILY
Qty: 180 TABLET | Refills: 3 | Status: SHIPPED | OUTPATIENT
Start: 2020-12-07 | End: 2021-09-23

## 2020-12-07 ASSESSMENT — PAIN SCALES - GENERAL: PAINLEVEL: MILD PAIN (3)

## 2020-12-07 ASSESSMENT — ENCOUNTER SYMPTOMS
FATIGUE: 1
NERVOUS/ANXIOUS: 0
FEVER: 0
COUGH: 1
SHORTNESS OF BREATH: 0
CHILLS: 0

## 2020-12-07 ASSESSMENT — MIFFLIN-ST. JEOR: SCORE: 1183.08

## 2020-12-07 NOTE — PROGRESS NOTES
SUBJECTIVE:   Nursing Notes:   Stephanie Miller LPN  12/7/2020 10:16 AM  Sign at exiting of workspace  Patient presents to clinic for follow up with potassium level, blood pressure and bronchitis.  She is still experiencing shortness of breath, weakness, fatigue and cough.  Medication Reconciliation: complete    Stephanie Miller LPN        Hailey Shah is a 74 year old female who presents to clinic today for follow up.  She had been hospitalized at [facility] between 11/17/-11/19/2020.  She had experienced vasovagal syncope, but also was diagnosed with community acquired RLL pneumonia and acute cystitis with hematuria.  She aslo had hypokalemia and an elevated troponin.  Hospitalist had discussed her case with Cardiology, who felt that her elevated troponin was most likely related to her infection.  Telemetry did not reveal any arrhythmias to case her syncope.  Her urinary tract infection and pneumonia were treated with levaquin.  She had been tested for covid and was negative.  She had followed up with Carissa Chapman MD on 11/30/2020 and still had a significantly decreased potassium at that time of 2.8.  This was repeated today with a potassium of 2.9 noted.  She had been started on potassium chloride 10 meq daily on 11/30/2020.  She is on spironolactone and also lasix.  Chest x-ray on 11/30/2020 showed atelectasis or scarring of her left lung base, but no other concerning findings.  She was told to hold her lasix a week ago.  She feels her swelling is not any worse.  She has actually lost weight since last week.  Her sense of taste is affected.  Her daughter was positive for Covid, but Hailey tested negative.  Her daughter lives with them.  She remains very fatigued.  She is still shortness of breath, but this is better. Her dizziness is better as well.  urine culture when she was in the hospital had grown out e. Coli.    Her blood pressures at home have ranged between 119-130/55-70.  Her machine may  read 10 points higher systolic than ours does when compared here.    She does see Dr. Granados from the [facility] at [place], [place].  She had her last visit with him in August.  She usually is seen every 6 months.  Her last echocardiogram was in August.  Her EF at that time was 45-50%.    HPI    I personally reviewed medications/allergies/history listed below:    Patient Active Problem List    Diagnosis Date Noted     Hypokalemia 11/17/2020     Priority: Medium     Vasovagal syncope 11/17/2020     Priority: Medium     Elevated troponin 11/17/2020     Priority: Medium     Acute cystitis with hematuria 11/17/2020     Priority: Medium     Community acquired pneumonia of right lower lobe of lung 11/17/2020     Priority: Medium     Family history of ischemic heart disease 01/16/2018     Priority: Medium     Overview:   Brother had fatal myocardial infarction at age 60.       Lumbago 01/16/2018     Priority: Medium     Overview:   Chronic low back pain secondary to injury, 1995, with chronic pain syndrome.       Hyperlipidemia 08/28/2017     Priority: Medium     Overview:   IMO Update 10/11       Essential hypertension 08/28/2017     Priority: Medium     Overview:   IMO Update       PVC's (premature ventricular contractions) 12/14/2016     Priority: Medium     At risk for amiodarone toxicity with long term use 06/09/2016     Priority: Medium     Arthralgia of right hip 04/26/2016     Priority: Medium     Idiopathic cardiomyopathy (H) 09/24/2014     Priority: Medium     CKD (chronic kidney disease) stage 3, GFR 30-59 ml/min 02/24/2014     Priority: Medium     Raynaud's disease 01/25/2012     Priority: Medium     Arthropathy of shoulder region 05/28/2010     Priority: Medium     Overview:   IMO Update 10/11       RCT (rotator cuff tear) 05/28/2010     Priority: Medium     Overview:   IMO Update 10/11       Obesity 11/12/2008     Priority: Medium     Overview:   Updated per 10/1/17 IMO import       Raynaud's  phenomenon (by history or observed) 2008     Priority: Medium     Overview:   IMO Update 10/11       Past Medical History:   Diagnosis Date     Chronic diastolic heart failure (H)     2014,ECHO 10/30/2013 - Final Impressions:  1. Mild left ventricular enlargement with a mild decrease in systolic function, estimated ejection fraction 45%.  2. Mild right ventricular enlargement with normal systolic function.  3. Mild left atrial enlargement.  4. Mild mitral regurgitation.  5. Trace tricuspid regurgitation.  6. Mild left ventricular diastolic dysfunction.  7. When compared t*     Chronic kidney disease, stage III (moderate)     2014     Essential (primary) hypertension     No Comments Provided     Hyperlipidemia     No Comments Provided     Low back pain     Chronic low back pain secondary to injury, , with chronic pain syndrome.     Other cardiomyopathies (CODE)     2013,Diagnosed by angio and cath study 2013 at [facility]. EF of 35% at first study     Personal history of other medical treatment (CODE)      with all vaginal deliveries     Raynaud's syndrome without gangrene     No Comments Provided     Tinnitus     No Comments Provided     Ventricular premature depolarization     3/25/2013     Ventricular tachycardia (H)     3/4/2014      Past Surgical History:   Procedure Laterality Date     ARTHROSCOPY SHOULDER Right     Shoulder surgery for impingement     ARTHROSCOPY SHOULDER Left      Left shoulder surgery for impingement     ARTHROSCOPY SHOULDER  2009    and debridement, distal clavical excision     Cardiac ablation for pvc      not successful     COLONOSCOPY      06,Inflammation noted, no further issues     COLONOSCOPY  2016,wnl no need f/u     DILATION AND CURETTAGE      D&C for retained placentas x2     FINGER SURGERY Left 10/1989    fourth finger, cyst removal     HYSTERECTOMY TOTAL ABDOMINAL, BILATERAL SALPINGO-OOPHORECTOMY, COMBINED       1991,Soren-Clifton procedure     MAMMOPLASTY AUGMENTATION      1983     REPAIR BLADDER      7/1999, with [facility] and Juan Miguel procedure with cystoscopy     Family History   Problem Relation Age of Onset     Heart Disease Mother         Heart Disease,CHF     Hypertension Mother         Hypertension     Other - See Comments Mother         rheumatic fever     Other - See Comments Father         Old age     Breast Cancer Sister         Cancer-breast     Heart Disease Brother         Heart Disease,MI     Blood Disease No family hx of         Blood Disease,no clotting disorders     Social History     Tobacco Use     Smoking status: Never Smoker     Smokeless tobacco: Never Used     Tobacco comment: Quit smoking: 3-4 diet coke daily   Substance Use Topics     Alcohol use: No     Alcohol/week: 0.0 standard drinks     Social History     Social History Narrative    . Gerardo - .  Retired from [employer] as a -retired 1/18/2012.  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    Children: Fariba Umaan, 1963, Ravindra, [place], Shreya Esquivel, 1965, OccidentalDominic Hong, 1967, OccidentalFouzia, 1968, [place]     Current Outpatient Medications   Medication Sig Dispense Refill     amiodarone (PACERONE/CODARONE) 100 MG TABS tablet Take 150 mg by mouth daily Take 1 and 1/2 tablet by mouth daily 135 tablet 3     amLODIPine (NORVASC) 2.5 MG tablet Take 2.5 mg by mouth daily        ascorbic acid (VITAMIN C) 500 MG tablet Take 500 mg by mouth daily        aspirin EC 81 MG EC tablet Take 81 mg by mouth daily        Calcium Carbonate-Vitamin D (CALCIUM 500 + D) 500-125 MG-UNIT TABS Take 1 tablet by mouth 2 times daily        fish oil-omega-3 fatty acids 1000 MG capsule Take 2 g by mouth 2 times daily       furosemide (LASIX) 40 MG tablet Take 40 mg by mouth daily        glucosamine-chondroitin 500-400 MG CAPS per capsule Take 1 capsule by mouth 2 times daily       ibuprofen (ADVIL/MOTRIN) 600 MG tablet  "Take 1 tablet (600 mg) by mouth every 6 hours as needed for moderate pain 60 tablet 2     metoprolol succinate (TOPROL-XL) 50 MG 24 hr tablet TAKE 1 TABLET BY MOUTH EVERY DAY 30 tablet 0     Multiple Vitamin (MULTI-VITAMINS) TABS Take 1 tablet by mouth       potassium chloride ER (KLOR-CON M) 20 MEQ CR tablet Take 1 tablet (20 mEq) by mouth 2 times daily 180 tablet 3     rosuvastatin (CRESTOR) 40 MG tablet Take 1 tablet (40 mg) by mouth At Bedtime 90 tablet 1     spironolactone (ALDACTONE) 25 MG tablet TAKE 1/2 TABLET BY MOUTH DAILY 45 tablet 4     Allergies   Allergen Reactions     Codeine GI Disturbance     \"gets sick\" per patient      Sulfa Drugs Unknown     Other reaction(s): *Unknown - Childhood Rxn per her mother       Review of Systems   Constitutional: Positive for fatigue. Negative for chills and fever.   Respiratory: Positive for cough. Negative for shortness of breath.    Cardiovascular: Negative for peripheral edema.   Psychiatric/Behavioral: Negative for mood changes. The patient is not nervous/anxious.         OBJECTIVE:     /66 (BP Location: Right arm, Patient Position: Sitting, Cuff Size: Adult Regular)   Pulse 60   Temp 97.7  F (36.5  C) (Tympanic)   Resp 18   Ht 1.626 m (5' 4\")   Wt 70.3 kg (155 lb)   LMP  (LMP Unknown)   SpO2 99%   Breastfeeding No   BMI 26.61 kg/m    Body mass index is 26.61 kg/m .  Physical Exam  Constitutional:       Appearance: Normal appearance.   HENT:      Head: Normocephalic.   Eyes:      Extraocular Movements: Extraocular movements intact.      Pupils: Pupils are equal, round, and reactive to light.   Neck:      Musculoskeletal: Normal range of motion and neck supple.   Cardiovascular:      Rate and Rhythm: Normal rate and regular rhythm.      Pulses: Normal pulses.      Heart sounds: No murmur.   Pulmonary:      Effort: Pulmonary effort is normal.      Breath sounds: Normal breath sounds. No wheezing, rhonchi or rales.   Musculoskeletal:      Right lower " leg: No edema.      Left lower leg: No edema.   Lymphadenopathy:      Cervical: No cervical adenopathy.   Neurological:      Mental Status: She is alert.   Psychiatric:         Mood and Affect: Mood normal.         Behavior: Behavior normal.           PHQ-9 SCORE 12/23/2015 4/26/2016 8/28/2017   PHQ-9 Total Score 5 9 0       PHQ-2 Score:     PHQ-2 ( 1999 Pfizer) 12/7/2020 11/30/2020   Q1: Little interest or pleasure in doing things 0 0   Q2: Feeling down, depressed or hopeless 0 0   PHQ-2 Score 0 0       SANGEETA-7 SCORE 7/22/2020   Total Score 0       I personally reviewed results withpatient as listed below:   Diagnostic Test Results:  Results for orders placed or performed in visit on 12/07/20 (from the past 24 hour(s))   Basic Metabolic Panel   Result Value Ref Range    Sodium 142 134 - 144 mmol/L    Potassium 2.9 (L) 3.5 - 5.1 mmol/L    Chloride 100 98 - 107 mmol/L    Carbon Dioxide 30 21 - 31 mmol/L    Anion Gap 12 3 - 14 mmol/L    Glucose 113 (H) 70 - 105 mg/dL    Urea Nitrogen 22 7 - 25 mg/dL    Creatinine 1.50 (H) 0.60 - 1.20 mg/dL    GFR Estimate 34 (L) >60 mL/min/[1.73_m2]    GFR Estimate If Black 41 (L) >60 mL/min/[1.73_m2]    Calcium 9.7 8.6 - 10.3 mg/dL       ASSESSMENT/PLAN:       ICD-10-CM    1. Hypokalemia  E87.6 potassium chloride ER (KLOR-CON M) 20 MEQ CR tablet     Basic Metabolic Panel   2. Pneumonia of right lower lobe due to infectious organism  J18.9 COVID-19 Virus (Coronavirus) Antibody & Titer Reflex   3. Stage 3b chronic kidney disease  N18.32 Basic Metabolic Panel   4. Acute cystitis with hematuria  N30.01 UA reflex to Microscopic     Urine Culture Aerobic Bacterial     CANCELED: UA reflex to Microscopic     CANCELED: Urine Culture Aerobic Bacterial   5. Idiopathic cardiomyopathy (H)  I42.8        1.  Her potassium is still quite low.  Recommend increasing her potassium chloride to 20 meq twice daily from 10 meq daily since she is still quite low.  Recommend continuing to hold her lasix.   Follow up with me in 1 week and will reassess her potassium level as well as whether safe to resume her lasix as well given her creatinine elevation from her baseline.    2.  Her daughter who lives with them had covid-19 shortly before Hailey and her  both got sick.  They both have tested negative.  Discussed that I am very suspicious that she probably has had covid with the known exposure and her significant illness.  When she has lab next week, will also check for covid antibodies at that time.  Order placed.  3.  Will continue to hold lasix for now and recheck Basic Metabolic Profile next week as noted above.  4.  Will plan to recheck her urinalysis & urine culture next visit as well.  She did not feel that she could leave a sample today.  5.  As above, will continue to hold lasix for another week and assess safety of restarting it at that time.    Roxana Maki MD  [facility]    Portions of this dictation were created using the Dragon Nuance voice recognition system. Proofreading was completed but there may be errors in text.

## 2020-12-07 NOTE — NURSING NOTE
Patient presents to clinic for follow up with potassium level, blood pressure and bronchitis.  She is still experiencing shortness of breath, weakness, fatigue and cough.  Medication Reconciliation: complete    Stephanie Miller LPN

## 2020-12-11 DIAGNOSIS — E78.2 MIXED HYPERLIPIDEMIA: ICD-10-CM

## 2020-12-11 RX ORDER — ROSUVASTATIN CALCIUM 40 MG/1
40 TABLET, COATED ORAL AT BEDTIME
Qty: 90 TABLET | Refills: 1 | Status: SHIPPED | OUTPATIENT
Start: 2020-12-11 | End: 2021-06-10

## 2020-12-11 NOTE — TELEPHONE ENCOUNTER
"Requested Prescriptions   Pending Prescriptions Disp Refills     rosuvastatin (CRESTOR) 40 MG tablet [Pharmacy Med Name: ROSUVASTATIN 40MG TABLET] 90 tablet 1     Sig: TAKE 1 TABLET (40 MG) BY MOUTH AT BEDTIME       Statins Protocol Failed - 12/11/2020  1:00 AM        Failed - LDL on file in past 12 months     Recent Labs   Lab Test 04/26/16  1115   LDL 85             Passed - No abnormal creatine kinase in past 12 months     No lab results found.             Passed - Recent (12 mo) or future (30 days) visit within the authorizing provider's specialty     Patient has had an office visit with the authorizing provider or a provider within the authorizing providers department within the previous 12 mos or has a future within next 30 days. See \"Patient Info\" tab in inbasket, or \"Choose Columns\" in Meds & Orders section of the refill encounter.              Passed - Medication is active on med list        Passed - Patient is age 18 or older        Passed - No active pregnancy on record        Passed - No positive pregnancy test in past 12 months               Last Written Prescription Date:  06/24/2020  Last Fill Quantity: 90,   # refills: 1  Last Office Visit: 12/07/2020 with Roxana Maki MD  Future Office visit:    Next 5 appointments (look out 90 days)    Dec 14, 2020 10:20 AM  Office Visit with Roxana Maki MD  [facility] ([facility]) [address]  [place] [id number] [phone]      Unable to complete prescription refill per RN medication refill policy. Will route to provider for review and consideration.  Anjana Murillo RN on 12/11/2020 at 8:00 AM             "

## 2020-12-14 ENCOUNTER — OFFICE VISIT (OUTPATIENT)
Dept: FAMILY MEDICINE | Facility: OTHER | Age: 75
End: 2020-12-14
Attending: FAMILY MEDICINE
Payer: COMMERCIAL

## 2020-12-14 VITALS
BODY MASS INDEX: 27.49 KG/M2 | TEMPERATURE: 97.5 F | DIASTOLIC BLOOD PRESSURE: 66 MMHG | WEIGHT: 161 LBS | HEART RATE: 54 BPM | RESPIRATION RATE: 18 BRPM | OXYGEN SATURATION: 99 % | SYSTOLIC BLOOD PRESSURE: 124 MMHG | HEIGHT: 64 IN

## 2020-12-14 DIAGNOSIS — E87.6 HYPOKALEMIA: Primary | ICD-10-CM

## 2020-12-14 DIAGNOSIS — J18.9 PNEUMONIA OF RIGHT LOWER LOBE DUE TO INFECTIOUS ORGANISM: ICD-10-CM

## 2020-12-14 DIAGNOSIS — Z79.899 ENCOUNTER FOR LONG-TERM (CURRENT) USE OF OTHER MEDICATIONS: ICD-10-CM

## 2020-12-14 DIAGNOSIS — E87.6 HYPOKALEMIA: ICD-10-CM

## 2020-12-14 DIAGNOSIS — N30.01 ACUTE CYSTITIS WITH HEMATURIA: ICD-10-CM

## 2020-12-14 DIAGNOSIS — N18.32 STAGE 3B CHRONIC KIDNEY DISEASE (H): ICD-10-CM

## 2020-12-14 DIAGNOSIS — I49.3 VENTRICULAR PREMATURE BEATS: ICD-10-CM

## 2020-12-14 DIAGNOSIS — Z91.89 PERSONAL HISTORY OF POISONING, PRESENTING HAZARDS TO HEALTH: ICD-10-CM

## 2020-12-14 DIAGNOSIS — I42.9 IDIOPATHIC CARDIOMYOPATHY (H): ICD-10-CM

## 2020-12-14 LAB
ALBUMIN UR-MCNC: 10 MG/DL
ALT SERPL W P-5'-P-CCNC: 15 U/L (ref 7–52)
ANION GAP SERPL CALCULATED.3IONS-SCNC: 8 MMOL/L (ref 3–14)
APPEARANCE UR: CLEAR
AST SERPL W P-5'-P-CCNC: 21 U/L (ref 13–39)
BACTERIA #/AREA URNS HPF: ABNORMAL /HPF
BILIRUB UR QL STRIP: NEGATIVE
BUN SERPL-MCNC: 24 MG/DL (ref 7–25)
CALCIUM SERPL-MCNC: 9.6 MG/DL (ref 8.6–10.3)
CHLORIDE SERPL-SCNC: 108 MMOL/L (ref 98–107)
CO2 SERPL-SCNC: 25 MMOL/L (ref 21–31)
COLOR UR AUTO: ABNORMAL
CREAT SERPL-MCNC: 1.2 MG/DL (ref 0.6–1.2)
GFR SERPL CREATININE-BSD FRML MDRD: 44 ML/MIN/{1.73_M2}
GLUCOSE SERPL-MCNC: 99 MG/DL (ref 70–105)
GLUCOSE UR STRIP-MCNC: NEGATIVE MG/DL
HGB UR QL STRIP: NEGATIVE
KETONES UR STRIP-MCNC: NEGATIVE MG/DL
LEUKOCYTE ESTERASE UR QL STRIP: NEGATIVE
MUCOUS THREADS #/AREA URNS LPF: PRESENT /LPF
NITRATE UR QL: NEGATIVE
PH UR STRIP: 5 PH (ref 5–7)
POTASSIUM SERPL-SCNC: 4.2 MMOL/L (ref 3.5–5.1)
RBC #/AREA URNS AUTO: <1 /HPF (ref 0–2)
SODIUM SERPL-SCNC: 141 MMOL/L (ref 134–144)
SOURCE: ABNORMAL
SP GR UR STRIP: 1.01 (ref 1–1.03)
TSH SERPL DL<=0.05 MIU/L-ACNC: 2.59 IU/ML (ref 0.34–5.6)
UROBILINOGEN UR STRIP-MCNC: NORMAL MG/DL (ref 0–2)
WBC #/AREA URNS AUTO: 1 /HPF (ref 0–5)

## 2020-12-14 PROCEDURE — 84460 ALANINE AMINO (ALT) (SGPT): CPT | Mod: ZL | Performed by: FAMILY MEDICINE

## 2020-12-14 PROCEDURE — 87086 URINE CULTURE/COLONY COUNT: CPT | Mod: ZL | Performed by: FAMILY MEDICINE

## 2020-12-14 PROCEDURE — 84450 TRANSFERASE (AST) (SGOT): CPT | Mod: ZL | Performed by: FAMILY MEDICINE

## 2020-12-14 PROCEDURE — 86769 SARS-COV-2 COVID-19 ANTIBODY: CPT | Mod: ZL | Performed by: FAMILY MEDICINE

## 2020-12-14 PROCEDURE — 84443 ASSAY THYROID STIM HORMONE: CPT | Mod: ZL

## 2020-12-14 PROCEDURE — 80048 BASIC METABOLIC PNL TOTAL CA: CPT | Mod: ZL | Performed by: FAMILY MEDICINE

## 2020-12-14 PROCEDURE — 36415 COLL VENOUS BLD VENIPUNCTURE: CPT | Mod: ZL | Performed by: FAMILY MEDICINE

## 2020-12-14 PROCEDURE — 99214 OFFICE O/P EST MOD 30 MIN: CPT | Performed by: FAMILY MEDICINE

## 2020-12-14 PROCEDURE — 81001 URINALYSIS AUTO W/SCOPE: CPT | Mod: ZL | Performed by: FAMILY MEDICINE

## 2020-12-14 PROCEDURE — G0463 HOSPITAL OUTPT CLINIC VISIT: HCPCS

## 2020-12-14 ASSESSMENT — ENCOUNTER SYMPTOMS
COUGH: 0
CHILLS: 0
SHORTNESS OF BREATH: 0
NERVOUS/ANXIOUS: 0
FEVER: 0

## 2020-12-14 ASSESSMENT — MIFFLIN-ST. JEOR: SCORE: 1205.29

## 2020-12-14 ASSESSMENT — PAIN SCALES - GENERAL: PAINLEVEL: MILD PAIN (2)

## 2020-12-14 NOTE — NURSING NOTE
Patient presents to clinic for follow up with pneumonia and lab work.  Medication Reconciliation: complete    Stephanie Miller LPN

## 2020-12-14 NOTE — PROGRESS NOTES
SUBJECTIVE:   Nursing Notes:   Stephanie Miller LPN  12/14/2020 10:17 AM  Sign at exiting of workspace  Patient presents to clinic for follow up with pneumonia and lab work.  Medication Reconciliation: complete    Stephanie Miller LPN        Hailey Shah is a 75 year old female who presents to clinic today for follow up.  I had seen her last on 12/7/2020.  At that time, she had been following up from a hospitalization between 11/17-11/19/2020.  She had vasovagal syncope and pneumonia with also acute cystitis.  She had hypokalemia and an elevated troponin as well, which was felt to be related to her infection.  Due to elevated creatinine, her lasix was held.  Her potassium was replaced.  Last week, when I had seen her, her creatinine was up to 1.50 and her potassium was still only 2.9.  She was told to increase her potassium chloride from 10 meq daily to 20 meq twice daily as well as to continue holding her lasix.  She is now up 6 lb from her visit last week.  At her visit last week, she had been down 4 lb from the prior week.  According to her home scale, she is up from 153.6 on her home scale on 12/7/2020.  On her home scale, she was 161.6 lb today.  Her blood pressures since last week have ranged between 118-127/58-71.  She feels stronger this week than she did last week.  She had some chest discomfort that comes and goes.  She thinks it might be related to not taking the lasix.  She has noted a lot more edema in her ankles lately than she normally would experience.    Has had issues with raynaud's for years.  Usually would just notice symptoms when outside, mostly in the winter or during chilly months.  Now is noticing symptoms over the past 2-3 days when she is inside as well.  Both of her hands are affected.  Her fingers even look dusky at times.  She is on aspirin daily as well as amlodipine.    HPI    I personally reviewed medications/allergies/history listed below:    Patient Active Problem List     Diagnosis Date Noted     Hypokalemia 11/17/2020     Priority: Medium     Vasovagal syncope 11/17/2020     Priority: Medium     Elevated troponin 11/17/2020     Priority: Medium     Acute cystitis with hematuria 11/17/2020     Priority: Medium     Community acquired pneumonia of right lower lobe of lung 11/17/2020     Priority: Medium     Family history of ischemic heart disease 01/16/2018     Priority: Medium     Overview:   Brother had fatal myocardial infarction at age 60.       Lumbago 01/16/2018     Priority: Medium     Overview:   Chronic low back pain secondary to injury, 1995, with chronic pain syndrome.       Hyperlipidemia 08/28/2017     Priority: Medium     Overview:   IMO Update 10/11       Essential hypertension 08/28/2017     Priority: Medium     Overview:   IMO Update       PVC's (premature ventricular contractions) 12/14/2016     Priority: Medium     At risk for amiodarone toxicity with long term use 06/09/2016     Priority: Medium     Arthralgia of right hip 04/26/2016     Priority: Medium     Idiopathic cardiomyopathy (H) 09/24/2014     Priority: Medium     CKD (chronic kidney disease) stage 3, GFR 30-59 ml/min 02/24/2014     Priority: Medium     Raynaud's disease 01/25/2012     Priority: Medium     Arthropathy of shoulder region 05/28/2010     Priority: Medium     Overview:   IMO Update 10/11       RCT (rotator cuff tear) 05/28/2010     Priority: Medium     Overview:   IMO Update 10/11       Obesity 11/12/2008     Priority: Medium     Overview:   Updated per 10/1/17 IMO import       Raynaud's phenomenon (by history or observed) 11/12/2008     Priority: Medium     Overview:   IMO Update 10/11       Past Medical History:   Diagnosis Date     Chronic diastolic heart failure (H)     2/24/2014,ECHO 10/30/2013 - Final Impressions:  1. Mild left ventricular enlargement with a mild decrease in systolic function, estimated ejection fraction 45%.  2. Mild right ventricular enlargement with normal systolic  function.  3. Mild left atrial enlargement.  4. Mild mitral regurgitation.  5. Trace tricuspid regurgitation.  6. Mild left ventricular diastolic dysfunction.  7. When compared t*     Chronic kidney disease, stage III (moderate)     2014     Essential (primary) hypertension     No Comments Provided     Hyperlipidemia     No Comments Provided     Low back pain     Chronic low back pain secondary to injury, , with chronic pain syndrome.     Other cardiomyopathies (CODE)     2013,Diagnosed by angio and cath study 2013 at [facility]. EF of 35% at first study     Personal history of other medical treatment (CODE)      with all vaginal deliveries     Raynaud's syndrome without gangrene     No Comments Provided     Tinnitus     No Comments Provided     Ventricular premature depolarization     3/25/2013     Ventricular tachycardia (H)     3/4/2014      Past Surgical History:   Procedure Laterality Date     ARTHROSCOPY SHOULDER Right     Shoulder surgery for impingement     ARTHROSCOPY SHOULDER Left      Left shoulder surgery for impingement     ARTHROSCOPY SHOULDER  2009    and debridement, distal clavical excision     Cardiac ablation for pvc      not successful     COLONOSCOPY      06,Inflammation noted, no further issues     COLONOSCOPY  2016,wnl no need f/u     DILATION AND CURETTAGE      D&C for retained placentas x2     FINGER SURGERY Left 10/1989    fourth finger, cyst removal     HYSTERECTOMY TOTAL ABDOMINAL, BILATERAL SALPINGO-OOPHORECTOMY, COMBINED      ,Soren-Clifton procedure     MAMMOPLASTY AUGMENTATION           REPAIR BLADDER      1999, with [facility] and Juan Miguel procedure with cystoscopy     Family History   Problem Relation Age of Onset     Heart Disease Mother         Heart Disease,CHF     Hypertension Mother         Hypertension     Other - See Comments Mother         rheumatic fever     Other - See Comments Father         Old age     Breast Cancer  Sister         Cancer-breast     Heart Disease Brother         Heart Disease,MI     Blood Disease No family hx of         Blood Disease,no clotting disorders     Social History     Tobacco Use     Smoking status: Never Smoker     Smokeless tobacco: Never Used     Tobacco comment: Quit smoking: 3-4 diet coke daily   Substance Use Topics     Alcohol use: No     Alcohol/week: 0.0 standard drinks     Social History     Social History Narrative    . Gerardo - .  Retired from [employer] as a -retired 1/18/2012.  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    Children: Fariba Umana, 1963, [place], [place], Shreya Esquivel, 1965, Tricia Hong, Dominic Shah, 1967, [place], 1968, [place]     Current Outpatient Medications   Medication Sig Dispense Refill     amiodarone (PACERONE/CODARONE) 100 MG TABS tablet Take 150 mg by mouth daily Take 1 and 1/2 tablet by mouth daily 135 tablet 3     amLODIPine (NORVASC) 2.5 MG tablet Take 2.5 mg by mouth daily        ascorbic acid (VITAMIN C) 500 MG tablet Take 500 mg by mouth daily        aspirin EC 81 MG EC tablet Take 81 mg by mouth daily        Calcium Carbonate-Vitamin D (CALCIUM 500 + D) 500-125 MG-UNIT TABS Take 1 tablet by mouth 2 times daily        fish oil-omega-3 fatty acids 1000 MG capsule Take 2 g by mouth 2 times daily       glucosamine-chondroitin 500-400 MG CAPS per capsule Take 1 capsule by mouth 2 times daily       metoprolol succinate (TOPROL-XL) 50 MG 24 hr tablet TAKE 1 TABLET BY MOUTH EVERY DAY 30 tablet 0     Multiple Vitamin (MULTI-VITAMINS) TABS Take 1 tablet by mouth       potassium chloride ER (KLOR-CON M) 20 MEQ CR tablet Take 1 tablet (20 mEq) by mouth 2 times daily 180 tablet 3     rosuvastatin (CRESTOR) 40 MG tablet TAKE 1 TABLET (40 MG) BY MOUTH AT BEDTIME 90 tablet 1     spironolactone (ALDACTONE) 25 MG tablet TAKE 1/2 TABLET BY MOUTH DAILY 45 tablet 4     furosemide (LASIX) 40 MG tablet Take 40 mg by mouth daily   "      Allergies   Allergen Reactions     Codeine GI Disturbance     \"gets sick\" per patient      Sulfa Drugs Unknown     Other reaction(s): *Unknown - Childhood Rxn per her mother       Review of Systems   Constitutional: Negative for chills and fever.   Respiratory: Negative for cough and shortness of breath.    Cardiovascular: Negative for peripheral edema.   Psychiatric/Behavioral: Negative for mood changes. The patient is not nervous/anxious.         OBJECTIVE:     /66 (BP Location: Right arm, Patient Position: Sitting, Cuff Size: Adult Regular)   Pulse 54   Temp 97.5  F (36.4  C) (Tympanic)   Resp 18   Ht 1.626 m (5' 4\")   Wt 73 kg (161 lb)   LMP  (LMP Unknown)   SpO2 99%   Breastfeeding No   BMI 27.64 kg/m    Body mass index is 27.64 kg/m .  Physical Exam  Constitutional:       Appearance: Normal appearance.   HENT:      Head: Normocephalic.   Eyes:      Extraocular Movements: Extraocular movements intact.      Pupils: Pupils are equal, round, and reactive to light.   Neck:      Musculoskeletal: Normal range of motion and neck supple.   Cardiovascular:      Rate and Rhythm: Normal rate and regular rhythm.      Pulses: Normal pulses.      Heart sounds: No murmur.   Pulmonary:      Effort: Pulmonary effort is normal.      Breath sounds: Normal breath sounds. No wheezing, rhonchi or rales.   Musculoskeletal:      Right lower leg: Edema (1+ edema to upper ankle) present.      Left lower leg: Edema (1+ edema to upper ankle) present.   Lymphadenopathy:      Cervical: No cervical adenopathy.   Neurological:      Mental Status: She is alert.   Psychiatric:         Mood and Affect: Mood normal.         Behavior: Behavior normal.           PHQ-9 SCORE 12/23/2015 4/26/2016 8/28/2017   PHQ-9 Total Score 5 9 0       PHQ-2 Score:     PHQ-2 ( 1999 Pfizer) 12/14/2020 12/7/2020   Q1: Little interest or pleasure in doing things 0 0   Q2: Feeling down, depressed or hopeless 0 0   PHQ-2 Score 0 0       SANGEETA-7 SCORE " 7/22/2020   Total Score 0         I personally reviewed results withpatient as listed below:   Diagnostic Test Results:  Results for orders placed or performed in visit on 12/14/20 (from the past 24 hour(s))   Basic Metabolic Panel   Result Value Ref Range    Sodium 141 134 - 144 mmol/L    Potassium 4.2 3.5 - 5.1 mmol/L    Chloride 108 (H) 98 - 107 mmol/L    Carbon Dioxide 25 21 - 31 mmol/L    Anion Gap 8 3 - 14 mmol/L    Glucose 99 70 - 105 mg/dL    Urea Nitrogen 24 7 - 25 mg/dL    Creatinine 1.20 0.60 - 1.20 mg/dL    GFR Estimate 44 (L) >60 mL/min/[1.73_m2]    GFR Estimate If Black 53 (L) >60 mL/min/[1.73_m2]    Calcium 9.6 8.6 - 10.3 mg/dL   ALT   Result Value Ref Range    ALT 15 7 - 52 U/L   AST   Result Value Ref Range    AST 21 13 - 39 U/L   UA reflex to Microscopic   Result Value Ref Range    Color Urine Light Yellow     Appearance Urine Clear     Glucose Urine Negative NEG^Negative mg/dL    Bilirubin Urine Negative NEG^Negative    Ketones Urine Negative NEG^Negative mg/dL    Specific Gravity Urine 1.011 1.003 - 1.035    Blood Urine Negative NEG^Negative    pH Urine 5.0 5.0 - 7.0 pH    Protein Albumin Urine 10 (A) NEG^Negative mg/dL    Urobilinogen mg/dL Normal 0.0 - 2.0 mg/dL    Nitrite Urine Negative NEG^Negative    Leukocyte Esterase Urine Negative NEG^Negative    Source Midstream Urine     RBC Urine <1 0 - 2 /HPF    WBC Urine 1 0 - 5 /HPF    Bacteria Urine Few (A) NEG^Negative /HPF    Mucous Urine Present (A) NEG^Negative /LPF   TSH Reflex GH   Result Value Ref Range    TSH Reflex 2.59 0.34 - 5.60 IU/mL       ASSESSMENT/PLAN:       ICD-10-CM    1. Hypokalemia  E87.6    2. Stage 3b chronic kidney disease  N18.32    3. Idiopathic cardiomyopathy (H)  I42.8    4. Acute cystitis with hematuria  N30.01        1.  Potassium is in better range today.  She should stay on same dose of potassium chloride with restarting her furosemide.  Follow-up in approximately 1 month to reassess potassium and renal function as  well.  2.  Creatinine is better today.  Recommended reassessment of creatinine in a month after restarting furosemide as noted above.  3.  She has had interval weight gain, likely related to having been off of furosemide.  Recommended restarting furosemide 40 mg daily, her previous dose.  She will continue to monitor her weights as she has been doing.  She is due for follow-up with cardiology in February.  4.  Initial UA today looks better.  Urine culture is pending as well.    Roxana Maki MD  [facility] [place]    Portions of this dictation were created using the Dragon Nuance voice recognition system. Proofreading was completed but there may be errors in text.

## 2020-12-15 LAB
COVID-19 SPIKE RBD ABY TITER: NORMAL
COVID-19 SPIKE RBD ABY: POSITIVE

## 2020-12-16 LAB
BACTERIA SPEC CULT: NO GROWTH
SPECIMEN SOURCE: NORMAL

## 2020-12-27 ENCOUNTER — HEALTH MAINTENANCE LETTER (OUTPATIENT)
Age: 75
End: 2020-12-27

## 2021-01-12 ENCOUNTER — OFFICE VISIT (OUTPATIENT)
Dept: FAMILY MEDICINE | Facility: OTHER | Age: 76
End: 2021-01-12
Attending: FAMILY MEDICINE
Payer: COMMERCIAL

## 2021-01-12 VITALS
HEART RATE: 51 BPM | OXYGEN SATURATION: 100 % | DIASTOLIC BLOOD PRESSURE: 60 MMHG | SYSTOLIC BLOOD PRESSURE: 124 MMHG | BODY MASS INDEX: 27.43 KG/M2 | WEIGHT: 159.8 LBS | RESPIRATION RATE: 18 BRPM | TEMPERATURE: 97.7 F

## 2021-01-12 DIAGNOSIS — E87.6 HYPOKALEMIA: ICD-10-CM

## 2021-01-12 DIAGNOSIS — I10 ESSENTIAL HYPERTENSION: Primary | ICD-10-CM

## 2021-01-12 DIAGNOSIS — N18.32 STAGE 3B CHRONIC KIDNEY DISEASE (H): ICD-10-CM

## 2021-01-12 DIAGNOSIS — I42.9 IDIOPATHIC CARDIOMYOPATHY (H): ICD-10-CM

## 2021-01-12 LAB
ANION GAP SERPL CALCULATED.3IONS-SCNC: 9 MMOL/L (ref 3–14)
BUN SERPL-MCNC: 18 MG/DL (ref 7–25)
CALCIUM SERPL-MCNC: 9.2 MG/DL (ref 8.6–10.3)
CHLORIDE SERPL-SCNC: 105 MMOL/L (ref 98–107)
CO2 SERPL-SCNC: 27 MMOL/L (ref 21–31)
CREAT SERPL-MCNC: 1.3 MG/DL (ref 0.6–1.2)
GFR SERPL CREATININE-BSD FRML MDRD: 40 ML/MIN/{1.73_M2}
GLUCOSE SERPL-MCNC: 111 MG/DL (ref 70–105)
POTASSIUM SERPL-SCNC: 3.7 MMOL/L (ref 3.5–5.1)
SODIUM SERPL-SCNC: 141 MMOL/L (ref 134–144)

## 2021-01-12 PROCEDURE — G0463 HOSPITAL OUTPT CLINIC VISIT: HCPCS

## 2021-01-12 PROCEDURE — 99214 OFFICE O/P EST MOD 30 MIN: CPT | Performed by: FAMILY MEDICINE

## 2021-01-12 PROCEDURE — 36415 COLL VENOUS BLD VENIPUNCTURE: CPT | Mod: ZL | Performed by: FAMILY MEDICINE

## 2021-01-12 PROCEDURE — 80048 BASIC METABOLIC PNL TOTAL CA: CPT | Mod: ZL | Performed by: FAMILY MEDICINE

## 2021-01-12 ASSESSMENT — ENCOUNTER SYMPTOMS
SHORTNESS OF BREATH: 1
NERVOUS/ANXIOUS: 0
CHILLS: 0
FEVER: 0
COUGH: 1

## 2021-01-12 NOTE — PROGRESS NOTES
SUBJECTIVE:   There are no exam notes on file for this visit.    Hailey Shah is a 75 year old female who presents to clinic today for follow up of her last visit with me on 12/14/2020.  We had restarted her lasix about a month ago and need to recheck her renal function and potassium today.  She had been in the hospital this fall with pneumonia, which was likely covid as her antibody test has subsequently tested positive.  Due to elevated creatinine, her lasix had been held.  The creatinine had subsequently recovered.  She has a history of idiopathic cardiomyopathy and needs to stay on the lasix as she had been noticing a lot of fluid retention off of it.  She notices she is still up about 10 lb from her baseline weight.  She has not changed what she is eating and has noticed gaining of weight.  No shortness of breath.  Has a slight cough that seems to be a residual from having had covid and still doesn't have her sense of taste/smell back completely.  She last saw Cardiology (Dr. Geovanni Granados) in 8/2020.  She is due to follow up with him in a year.  Not having any chest pain.  She does have shortness of breath with exertion since having had covid, but she wonders if this was due to inactivity and having been in bed for about a month.  She notices that her memory is worse since having covid too.  In the past week, her blood pressures have ranged between 113-129/57-63.          HPI    I personally reviewed medications/allergies/history listed below:    Patient Active Problem List    Diagnosis Date Noted     Hypokalemia 11/17/2020     Priority: Medium     Vasovagal syncope 11/17/2020     Priority: Medium     Elevated troponin 11/17/2020     Priority: Medium     Acute cystitis with hematuria 11/17/2020     Priority: Medium     Community acquired pneumonia of right lower lobe of lung 11/17/2020     Priority: Medium     Family history of ischemic heart disease 01/16/2018     Priority: Medium     Overview:   Brother had  fatal myocardial infarction at age 60.       Lumbago 01/16/2018     Priority: Medium     Overview:   Chronic low back pain secondary to injury, 1995, with chronic pain syndrome.       Hyperlipidemia 08/28/2017     Priority: Medium     Overview:   IMO Update 10/11       Essential hypertension 08/28/2017     Priority: Medium     Overview:   IMO Update       PVC's (premature ventricular contractions) 12/14/2016     Priority: Medium     At risk for amiodarone toxicity with long term use 06/09/2016     Priority: Medium     Arthralgia of right hip 04/26/2016     Priority: Medium     Idiopathic cardiomyopathy (H) 09/24/2014     Priority: Medium     CKD (chronic kidney disease) stage 3, GFR 30-59 ml/min 02/24/2014     Priority: Medium     Raynaud's disease 01/25/2012     Priority: Medium     Arthropathy of shoulder region 05/28/2010     Priority: Medium     Overview:   IMO Update 10/11       RCT (rotator cuff tear) 05/28/2010     Priority: Medium     Overview:   IMO Update 10/11       Obesity 11/12/2008     Priority: Medium     Overview:   Updated per 10/1/17 IMO import       Raynaud's phenomenon (by history or observed) 11/12/2008     Priority: Medium     Overview:   IMO Update 10/11       Past Medical History:   Diagnosis Date     Chronic diastolic heart failure (H)     2/24/2014,ECHO 10/30/2013 - Final Impressions:  1. Mild left ventricular enlargement with a mild decrease in systolic function, estimated ejection fraction 45%.  2. Mild right ventricular enlargement with normal systolic function.  3. Mild left atrial enlargement.  4. Mild mitral regurgitation.  5. Trace tricuspid regurgitation.  6. Mild left ventricular diastolic dysfunction.  7. When compared t*     Chronic kidney disease, stage III (moderate)     2/24/2014     Essential (primary) hypertension     No Comments Provided     Hyperlipidemia     No Comments Provided     Low back pain     Chronic low back pain secondary to injury, 1995, with chronic pain  syndrome.     Other cardiomyopathies (CODE)     2013,Diagnosed by angio and cath study 2013 at [facility]. EF of 35% at first study     Personal history of other medical treatment (CODE)      with all vaginal deliveries     Raynaud's syndrome without gangrene     No Comments Provided     Tinnitus     No Comments Provided     Ventricular premature depolarization     3/25/2013     Ventricular tachycardia (H)     3/4/2014      Past Surgical History:   Procedure Laterality Date     ARTHROSCOPY SHOULDER Right     Shoulder surgery for impingement     ARTHROSCOPY SHOULDER Left      Left shoulder surgery for impingement     ARTHROSCOPY SHOULDER  2009    and debridement, distal clavical excision     Cardiac ablation for pvc      not successful     COLONOSCOPY      06,Inflammation noted, no further issues     COLONOSCOPY  2016,wnl no need f/u     DILATION AND CURETTAGE      D&C for retained placentas x2     FINGER SURGERY Left 10/1989    fourth finger, cyst removal     HYSTERECTOMY TOTAL ABDOMINAL, BILATERAL SALPINGO-OOPHORECTOMY, COMBINED      ,Soren-Clifton procedure     MAMMOPLASTY AUGMENTATION           REPAIR BLADDER      1999, with [facility] and Juan Miguel procedure with cystoscopy     Family History   Problem Relation Age of Onset     Heart Disease Mother         Heart Disease,CHF     Hypertension Mother         Hypertension     Other - See Comments Mother         rheumatic fever     Other - See Comments Father         Old age     Breast Cancer Sister         Cancer-breast     Heart Disease Brother         Heart Disease,MI     Blood Disease No family hx of         Blood Disease,no clotting disorders     Social History     Tobacco Use     Smoking status: Never Smoker     Smokeless tobacco: Never Used     Tobacco comment: Quit smoking: 3-4 diet coke daily   Substance Use Topics     Alcohol use: No     Alcohol/week: 0.0 standard drinks     Social History     Social History  "Narrative    . Gerardo - .  Retired from [employer] as a -retired 1/18/2012.  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    Children: Fariba Dong, 1963, [place], [place], Shreya Esquivel, 1965, Tricia Hong, Dominic Shah, 1967, [place], Fouzia Avalos, 1968, [place]     Current Outpatient Medications   Medication Sig Dispense Refill     amiodarone (PACERONE/CODARONE) 100 MG TABS tablet Take 150 mg by mouth daily Take 1 and 1/2 tablet by mouth daily 135 tablet 3     amLODIPine (NORVASC) 2.5 MG tablet Take 2.5 mg by mouth daily        ascorbic acid (VITAMIN C) 500 MG tablet Take 500 mg by mouth daily        aspirin EC 81 MG EC tablet Take 81 mg by mouth daily        Calcium Carbonate-Vitamin D (CALCIUM 500 + D) 500-125 MG-UNIT TABS Take 1 tablet by mouth 2 times daily        fish oil-omega-3 fatty acids 1000 MG capsule Take 2 g by mouth 2 times daily       furosemide (LASIX) 40 MG tablet Take 40 mg by mouth daily        glucosamine-chondroitin 500-400 MG CAPS per capsule Take 1 capsule by mouth 2 times daily       metoprolol succinate (TOPROL-XL) 50 MG 24 hr tablet TAKE 1 TABLET BY MOUTH EVERY DAY 30 tablet 0     Multiple Vitamin (MULTI-VITAMINS) TABS Take 1 tablet by mouth       potassium chloride ER (KLOR-CON M) 20 MEQ CR tablet Take 1 tablet (20 mEq) by mouth 2 times daily 180 tablet 3     rosuvastatin (CRESTOR) 40 MG tablet TAKE 1 TABLET (40 MG) BY MOUTH AT BEDTIME 90 tablet 1     spironolactone (ALDACTONE) 25 MG tablet TAKE 1/2 TABLET BY MOUTH DAILY 45 tablet 4     Allergies   Allergen Reactions     Codeine GI Disturbance     \"gets sick\" per patient      Sulfa Drugs Unknown     Other reaction(s): *Unknown - Childhood Rxn per her mother       Review of Systems   Constitutional: Negative for chills and fever.   Respiratory: Positive for cough and shortness of breath.    Cardiovascular: Positive for peripheral edema.   Psychiatric/Behavioral: Negative for mood changes. The patient is not " nervous/anxious.         OBJECTIVE:     /60 (BP Location: Right arm, Patient Position: Sitting, Cuff Size: Adult Regular)   Pulse 51   Temp 97.7  F (36.5  C) (Tympanic)   Resp 18   Wt 72.5 kg (159 lb 12.8 oz)   LMP  (LMP Unknown)   SpO2 100%   Breastfeeding No   BMI 27.43 kg/m    Body mass index is 27.43 kg/m .  Physical Exam  Constitutional:       Appearance: Normal appearance.   HENT:      Head: Normocephalic.   Eyes:      Extraocular Movements: Extraocular movements intact.      Pupils: Pupils are equal, round, and reactive to light.   Neck:      Musculoskeletal: Normal range of motion and neck supple.   Cardiovascular:      Rate and Rhythm: Normal rate and regular rhythm.      Pulses: Normal pulses.      Heart sounds: No murmur.   Pulmonary:      Effort: Pulmonary effort is normal.      Breath sounds: Normal breath sounds. No wheezing, rhonchi or rales.   Musculoskeletal:      Right lower leg: Edema (trace) present.      Left lower leg: Edema (trace) present.   Lymphadenopathy:      Cervical: No cervical adenopathy.   Neurological:      Mental Status: She is alert.   Psychiatric:         Mood and Affect: Mood normal.         Behavior: Behavior normal.             PHQ-2 Score:     PHQ-2 ( 1999 Pfizer) 1/12/2021 12/14/2020   Q1: Little interest or pleasure in doing things 0 0   Q2: Feeling down, depressed or hopeless 0 0   PHQ-2 Score 0 0           I personally reviewed results withpatient as listed below:   Diagnostic Test Results:  Results for orders placed or performed in visit on 01/12/21 (from the past 24 hour(s))   Basic Metabolic Panel   Result Value Ref Range    Sodium 141 134 - 144 mmol/L    Potassium 3.7 3.5 - 5.1 mmol/L    Chloride 105 98 - 107 mmol/L    Carbon Dioxide 27 21 - 31 mmol/L    Anion Gap 9 3 - 14 mmol/L    Glucose 111 (H) 70 - 105 mg/dL    Urea Nitrogen 18 7 - 25 mg/dL    Creatinine 1.30 (H) 0.60 - 1.20 mg/dL    GFR Estimate 40 (L) >60 mL/min/[1.73_m2]    GFR Estimate If Black  48 (L) >60 mL/min/[1.73_m2]    Calcium 9.2 8.6 - 10.3 mg/dL       ASSESSMENT/PLAN:       ICD-10-CM    1. Essential hypertension  I10 Basic Metabolic Panel   2. Idiopathic cardiomyopathy (H)  I42.8 Basic Metabolic Panel   3. Hypokalemia  E87.6 Basic Metabolic Panel   4. Stage 3b chronic kidney disease  N18.32 Basic Metabolic Panel       1.  Her blood pressure is stable.  No changes to medications for blood pressure at this time.   2.  She feels that she is still more edematous than she should be.  She does have a trace on exam.  She is a little shortness of breath as well, but this could be a sequela of her covid infection as well. Will have her increase her furosemide to 40 mg twice daily (from 40 mg daily) for 4 days to see if this helps.  If she continues to have a significant amount of shortness of breath, would recommend that we consider repeating her echocardiogram and possibly repeat imaging of her chest with having had covid this fall to ensure her cardiomyopathy has not worsened.  Follow up in 1 month with repeat BMP, sooner if shortness of breath is worsening.  3.  Potassium is within normal limits today.  4.  Creatinine is stable today.    Roxana Maki MD  [facility]    Portions of this dictation were created using the Dragon Nuance voice recognition system. Proofreading was completed but there may be errors in text.

## 2021-01-12 NOTE — PATIENT INSTRUCTIONS
For the next 4 days, take furosemide 40 mg every morning and 40 mg at noon to help with your swelling.

## 2021-02-02 ENCOUNTER — TRANSFERRED RECORDS (OUTPATIENT)
Dept: HEALTH INFORMATION MANAGEMENT | Facility: OTHER | Age: 76
End: 2021-02-02

## 2021-02-02 ENCOUNTER — OFFICE VISIT (OUTPATIENT)
Dept: PULMONOLOGY | Facility: OTHER | Age: 76
End: 2021-02-02
Attending: INTERNAL MEDICINE
Payer: COMMERCIAL

## 2021-02-02 VITALS
HEART RATE: 50 BPM | OXYGEN SATURATION: 98 % | SYSTOLIC BLOOD PRESSURE: 120 MMHG | WEIGHT: 158.6 LBS | TEMPERATURE: 98.2 F | BODY MASS INDEX: 27.22 KG/M2 | DIASTOLIC BLOOD PRESSURE: 88 MMHG | RESPIRATION RATE: 16 BRPM

## 2021-02-02 DIAGNOSIS — R91.8 PULMONARY NODULES: Primary | ICD-10-CM

## 2021-02-02 PROCEDURE — G0463 HOSPITAL OUTPT CLINIC VISIT: HCPCS

## 2021-02-02 ASSESSMENT — PAIN SCALES - GENERAL: PAINLEVEL: NO PAIN (0)

## 2021-02-02 NOTE — NURSING NOTE
"Chief Complaint   Patient presents with     Follow Up     6 Month Follow Up        Initial /88 (BP Location: Right arm, Patient Position: Chair, Cuff Size: Adult Regular)   Pulse 50   Temp 98.2  F (36.8  C) (Tympanic)   Resp 16   Wt 158 lb 9.6 oz (71.9 kg)   LMP  (LMP Unknown)   SpO2 98%   Breastfeeding No   BMI 27.22 kg/m   Estimated body mass index is 27.22 kg/m  as calculated from the following:    Height as of 12/14/20: 5' 4\" (1.626 m).    Weight as of this encounter: 158 lb 9.6 oz (71.9 kg).  Medication Reconciliation: complete      Audrey Sims LPN on 2/2/2021 at 1:21 PM   "

## 2021-02-03 NOTE — PROGRESS NOTES
History of Present Illness  This is a [facility] Pulmonary Medicine outreach note.  The patient is a 75-year-old female who returns today in follow-up for abnormal chest CT and dyspnea.  She was last seen by me via telemedicine on 4/28/2020.     She returns today denying any respiratory symptoms.  She does report that she had COVID 19 in the fall and developed significant weakness, nausea, and vomiting from it.  Overall, she lost 30 lb.  She does have some dyspnea with exertion as she tries to get her strength and wind back.    Tests obtain since her last visit include:    10/11/2020 CT chest shows a right lower lobe 1.7 cm nodule that is not significantly changed.    Previous tests include:    2/27/2020 lab work which showed an ESR 14, negative NATALIE, negative rheumatoid factor, negative CCP, negative ANCA, negative fungal serologies to Histoplasma, Blastomyces, and Coccidioides.    4/24/2020 CT chest which is personally reviewed and compared with her previous chest CT done on 1/20/2020.  I agree with radiology interpretation of no change in her right lower lobe nodule which they measure at 16.6 x 10.3 mm.  No new findings are seen.    In review, the patient developed rib fractures in November, 2019 prompting imaging of her chest which revealed a lung nodule as outlined below.  She denies any traumatic event that caused a rib fractures.  She had been lifting heavier objects, and she suspects that they may have been the cause.  She denies any sleep walking or getting out of bed at night.  She has had no falls.    She does report some stable dyspnea with exertion occasionally having to stop to catch her breath climbing stairs or hills.  She denies any coughing, wheezing, chest pain, or lightheadedness.    She denies any past pulmonary history.  She has no history of asthma or seasonal or environmental allergies.  She does have a history of nonischemic cardiomyopathy but denies any history of autoimmune  conditions.    She is a lifelong nonsmoker.  She has traveled to [place] and [place] as well as to [place].  She has never traveled to the [facility].  She has a dog at home but no birds or exotic pets.  She has no hot tubs in the home.  She has no known tuberculosis exposure, but she does report that her paternal grandmother  of tuberculosis.  She has been tested for TB in the past and found negative.  She worked as a .  Family history is positive for tuberculosis.    Outside workup includes:    2020 chest CT which is personally reviewed.  I agree with radiology interpretation of a 0.6 x 1.6 cm oval nodule in the right lower lobe.  She has mild bronchiectasis distal to this nodule.  No mediastinal or hilar adenopathy is seen.  Radiology reports a small sliding hiatal hernia.    2020 PET scan was obtained which again shows a 0.9 x 1.5 cm right lower lobe nodule with an SUV uptake of 3.16 which is estimated as low/borderline.  They also saw uptake in the right 8th and 9th ribs representing healing fractures.  No other areas of uptake were seen.      Home Medications     - Last Reconciled 21 by Heidi Sharma, BRET    acetaminophen 500 mg tablet (Tylenol Extra Strength)  500 mg PO Q6H PRN  amiodarone 100 mg tablet  100 mg PO DAILY  amlodipine 2.5 mg tablet  2.5 mg PO DAILY  ascorbate calcium (vitamin C) 500 mg tablet  500 mg PO DAILY  aspirin 81 mg tablet,delayed release  81 mg PO DAILY  calcium carbonate-vitamin D3 600 mg calcium-200 unit capsule (Calcium 600 + D(3))  caps PO BID  diphenhydramine 25 mg-acetaminophen 500 mg/15 mL oral solution   mL PO HS  fish, borage, flaxseed oils-omega 3,6,9 cb #1 400 mg-400 mg-400 mg cap (Omega 3-6-9 Complex)  1 cap PO BID  furosemide 40 mg tablet  40 mg PO DAILY  glucosamine sulfate 500 mg tablet (Glucosamine)  500 mg PO BID  metoprolol tartrate 50 mg tablet  50 mg PO DAILY  multivitamin   1 cap PO DAILY  potassium chloride 20 mEq oral  packet (Klor-Con)  20 mEq PO DAILY  rosuvastatin 40 mg tablet (Crestor)  40 mg PO DAILY  spironolactone 25 mg tablet  12.5 mg PO DAILY    Allergies    Sulfa (Sulfonamide Antibiotics) Allergy (Mild, Verified 04/28/20 08:59)  unknown  codeine Allergy (Unknown, Verified 02/03/21 08:58)  Unknown    PFSH  PFSH:     Social History (Reviewed 04/28/20 @ 08:59 by Lilian Redd [facility])  Smoking Status:  Never smoker  second hand exposure:  No       ROS  Constitutional:   Reports daytime sleepiness, difficulty sleeping, fatigue, night sweats and weight loss;  Denies chills, fever(s), headache(s), snoring or weight gain    Eyes:   Denies change in vision    ENT:   Denies vertigo, headache(s), hearing problems or snoring    Cardiovascular:   Denies chest pain, lightheadedness or dyspnea    Respiratory:   Reports dyspnea;  Denies cough, hemoptysis, excessive phlegm production, snoring or wheezing    Gastrointestinal:   Denies constipation, diarrhea, nausea or vomiting    Urinary:   Denies dysuria    Musculoskeletal:   Denies myalgias, arthralgias, muscle weakness, stiffness or swelling    Integumentary:   Reports change in hair;  Denies skin change, change in nails or rash    Neurological:   Denies lightheadedness, vertigo, headache(s), memory loss or seizures    Psychiatric:   Denies anxiety, depression or memory loss    Endocrine:   Denies fatigue or thyroid disease    Allergic/Immunologic:   Reports dyspnea;  Denies wheezing, neck lymphadenopathy or rash      Vital Signs      02/03/21  08:58  Height    5 ft 4 in  Height (cm)    162.6  Weight    71.668 kg  Weight (lb)    158 lbs and  0.0  ozs  BMI    27.1  BSA    1.77  BP    120/88 H  Blood Pressure Location    Right Arm  Position    Sitting  Respiration    16  Pulse Rate    50 L  Pulse Oximetry (%)    98  Oxygen Delivery Method    room air  Pain Scale (0-10)    0    Exam  Physical Exam:   General:  Alert and oriented, appears comfortable on room air.    Head: Normocephalic and  atraumatic.    Eyes: Pupils equal, round, reactive to light, sclera are clear.    Face: Facial muscles are symmetric.    Oral cavity:  She wore a mask during the visit.    Neck: Supple and without adenopathy.    Respiratory/Chest: Normal chest rise and respiratory excursion.  Clear to auscultation and percussion bilaterally. No wheezes, rhonchi, or rales noted.    Extremities: No cyanosis, clubbing, or edema noted.    Cardiovascular: Regular rate and rhythm, normal S1 and S2, no murmurs, gallops, or rubs.    Lymph nodes: No cervical, supraclavicular adenopathy noted.    Neurologic: Moves all extremities and ambulates without difficulty.     A&P  Assessment & Plan  (1) Other nonspecific abnormal finding of lung field:        Status: Acute        Code(s):  R91.8 - Other nonspecific abnormal finding of lung field        Abnormal chest CT.  The patient has a right lower lobe 0.9 x 1.5 cm lobulated nodule that has shown stability over 9 months from January, 2020 through October, 2020.  PET scanning has shown it to have a borderline/low metabolic uptake.  She has had right-sided rib fractures of undetermined cause.  There was no evidence of them being pathologic fractures on the PET scan.  Workup for underlying autoimmune or fungal causes have been negative.     At this point, I will obtain another chest CT as a 6 month follow-up which will be in April, 2021. If there is no change at that point, then I will obtain 1 more CT scan the following year.  She will return to see me after her chest CT.      (2) Dyspnea on exertion:        Status: Acute        Code(s):  R06.09 - Other forms of dyspnea        Dyspnea on exertion.  This is due to deconditioning from her COVID 19 infection.  I have encouraged her to continue to exercise and strengthen.  (3) Non-smoker:        Status: Acute        Code(s):  Z78.9 - Other specified health status  Departure  Follow Up:      2 Months ([place])

## 2021-02-12 ENCOUNTER — TELEPHONE (OUTPATIENT)
Dept: FAMILY MEDICINE | Facility: OTHER | Age: 76
End: 2021-02-12

## 2021-02-12 ENCOUNTER — OFFICE VISIT (OUTPATIENT)
Dept: FAMILY MEDICINE | Facility: OTHER | Age: 76
End: 2021-02-12
Attending: FAMILY MEDICINE
Payer: COMMERCIAL

## 2021-02-12 VITALS
TEMPERATURE: 97.5 F | SYSTOLIC BLOOD PRESSURE: 136 MMHG | WEIGHT: 160 LBS | DIASTOLIC BLOOD PRESSURE: 78 MMHG | HEIGHT: 64 IN | RESPIRATION RATE: 16 BRPM | BODY MASS INDEX: 27.31 KG/M2 | HEART RATE: 64 BPM | OXYGEN SATURATION: 97 %

## 2021-02-12 DIAGNOSIS — I42.9 IDIOPATHIC CARDIOMYOPATHY (H): ICD-10-CM

## 2021-02-12 DIAGNOSIS — I42.9 IDIOPATHIC CARDIOMYOPATHY (H): Primary | ICD-10-CM

## 2021-02-12 DIAGNOSIS — R25.3 MUSCLE TWITCH: ICD-10-CM

## 2021-02-12 DIAGNOSIS — N18.32 STAGE 3B CHRONIC KIDNEY DISEASE (H): ICD-10-CM

## 2021-02-12 DIAGNOSIS — I10 ESSENTIAL HYPERTENSION: ICD-10-CM

## 2021-02-12 DIAGNOSIS — R23.2 HOT FLASHES: ICD-10-CM

## 2021-02-12 DIAGNOSIS — E87.6 HYPOKALEMIA: ICD-10-CM

## 2021-02-12 DIAGNOSIS — Z13.29 SCREENING FOR THYROID DISORDER: ICD-10-CM

## 2021-02-12 LAB
ALBUMIN UR-MCNC: NEGATIVE MG/DL
ANION GAP SERPL CALCULATED.3IONS-SCNC: 11 MMOL/L (ref 3–14)
APPEARANCE UR: CLEAR
BILIRUB UR QL STRIP: NEGATIVE
BUN SERPL-MCNC: 17 MG/DL (ref 7–25)
CALCIUM SERPL-MCNC: 9.9 MG/DL (ref 8.6–10.3)
CHLORIDE SERPL-SCNC: 104 MMOL/L (ref 98–107)
CO2 SERPL-SCNC: 28 MMOL/L (ref 21–31)
COLOR UR AUTO: YELLOW
CREAT SERPL-MCNC: 1.39 MG/DL (ref 0.6–1.2)
GFR SERPL CREATININE-BSD FRML MDRD: 37 ML/MIN/{1.73_M2}
GLUCOSE SERPL-MCNC: 100 MG/DL (ref 70–105)
GLUCOSE UR STRIP-MCNC: NEGATIVE MG/DL
HGB UR QL STRIP: NEGATIVE
KETONES UR STRIP-MCNC: NEGATIVE MG/DL
LEUKOCYTE ESTERASE UR QL STRIP: NEGATIVE
MAGNESIUM SERPL-MCNC: 2.1 MG/DL (ref 1.9–2.7)
NITRATE UR QL: NEGATIVE
PH UR STRIP: 5 PH (ref 5–7)
POTASSIUM SERPL-SCNC: 4 MMOL/L (ref 3.5–5.1)
SODIUM SERPL-SCNC: 143 MMOL/L (ref 134–144)
SOURCE: NORMAL
SP GR UR STRIP: 1.01 (ref 1–1.03)
TSH SERPL DL<=0.05 MIU/L-ACNC: 3.67 IU/ML (ref 0.34–5.6)
UROBILINOGEN UR STRIP-MCNC: NORMAL MG/DL (ref 0–2)

## 2021-02-12 PROCEDURE — 99214 OFFICE O/P EST MOD 30 MIN: CPT | Performed by: FAMILY MEDICINE

## 2021-02-12 PROCEDURE — G0463 HOSPITAL OUTPT CLINIC VISIT: HCPCS

## 2021-02-12 PROCEDURE — 83735 ASSAY OF MAGNESIUM: CPT | Mod: ZL | Performed by: FAMILY MEDICINE

## 2021-02-12 PROCEDURE — 84443 ASSAY THYROID STIM HORMONE: CPT | Mod: ZL | Performed by: FAMILY MEDICINE

## 2021-02-12 PROCEDURE — 81003 URINALYSIS AUTO W/O SCOPE: CPT | Mod: ZL | Performed by: FAMILY MEDICINE

## 2021-02-12 PROCEDURE — 36415 COLL VENOUS BLD VENIPUNCTURE: CPT | Mod: ZL | Performed by: FAMILY MEDICINE

## 2021-02-12 PROCEDURE — 80048 BASIC METABOLIC PNL TOTAL CA: CPT | Mod: ZL | Performed by: FAMILY MEDICINE

## 2021-02-12 ASSESSMENT — ENCOUNTER SYMPTOMS
FATIGUE: 0
SHORTNESS OF BREATH: 1
FEVER: 0
CHILLS: 0
COUGH: 0
NERVOUS/ANXIOUS: 0

## 2021-02-12 ASSESSMENT — PAIN SCALES - GENERAL: PAINLEVEL: NO PAIN (0)

## 2021-02-12 ASSESSMENT — MIFFLIN-ST. JEOR: SCORE: 1200.76

## 2021-02-12 NOTE — NURSING NOTE
Patient presents to clinic for follow up with repeat BMP lab work.  Medication Reconciliation: complete    Stephanie Miller LPN

## 2021-02-12 NOTE — TELEPHONE ENCOUNTER
Patient saw me in clinic today and stated that she had had her first Covid vaccine (pfizer) in [place] on 2/3/2021.  She has another appointment for her 2nd dose in [place], but she was wondering if she would be able to get it here to avoid having to travel.      Nursing:  Can you please check to see if is possible for her to get her 2nd Covid vaccine here or if she would have to get it in [place].  Please call her when you find out what the [facility] policy is.  Thank you.    Roxaan Maki MD

## 2021-02-12 NOTE — TELEPHONE ENCOUNTER
Per Supervisor Luca, if patient's did not get the 1st vaccine here they can not get their 2nd here.  This regards the counting of doses per vial that have been given and not to cross different lots of the immunization.    Patient notified and verbally understood.  Stephanie Miller LPN............2/12/2021 2:04 PM

## 2021-02-12 NOTE — PROGRESS NOTES
SUBJECTIVE:   Nursing Notes:   Stephanie Miller LPN  2/12/2021 10:02 AM  Sign at exiting of workspace  Patient presents to clinic for follow up with repeat BMP lab work.  Medication Reconciliation: complete    Stephanie Miller LPN        Hailey Shah is a 75 year old female who presents to clinic today for follow up.  I last saw her on 1/21/2021.  She had restarted her lasix for her idiopathic cardiomyopathy and had  needed to recheck her renal function and potassium at that time.  At her last visit, she was still having a little edema and shortness of breath.  Her lasix was increased to 40 mg twice daily from 40 mg daily for 4 days.  Has been noticing twitching frequently and hot flashes at night for the past month.  No fever.  No cough, a little shortness of breath with exertion.      HPI    I personally reviewed medications/allergies/history listed below:    Patient Active Problem List    Diagnosis Date Noted     Hypokalemia 11/17/2020     Priority: Medium     Vasovagal syncope 11/17/2020     Priority: Medium     Elevated troponin 11/17/2020     Priority: Medium     Acute cystitis with hematuria 11/17/2020     Priority: Medium     Community acquired pneumonia of right lower lobe of lung 11/17/2020     Priority: Medium     Family history of ischemic heart disease 01/16/2018     Priority: Medium     Overview:   Brother had fatal myocardial infarction at age 60.       Lumbago 01/16/2018     Priority: Medium     Overview:   Chronic low back pain secondary to injury, 1995, with chronic pain syndrome.       Hyperlipidemia 08/28/2017     Priority: Medium     Overview:   IMO Update 10/11       Essential hypertension 08/28/2017     Priority: Medium     Overview:   IMO Update       PVC's (premature ventricular contractions) 12/14/2016     Priority: Medium     At risk for amiodarone toxicity with long term use 06/09/2016     Priority: Medium     Arthralgia of right hip 04/26/2016     Priority: Medium     Idiopathic  cardiomyopathy (H) 2014     Priority: Medium     CKD (chronic kidney disease) stage 3, GFR 30-59 ml/min 2014     Priority: Medium     Raynaud's disease 2012     Priority: Medium     Arthropathy of shoulder region 2010     Priority: Medium     Overview:   IMO Update 10/11       RCT (rotator cuff tear) 2010     Priority: Medium     Overview:   IMO Update 10/11       Obesity 2008     Priority: Medium     Overview:   Updated per 10/1/17 IMO import       Raynaud's phenomenon (by history or observed) 2008     Priority: Medium     Overview:   IMO Update 10/11       Past Medical History:   Diagnosis Date     Chronic diastolic heart failure (H)     2014,ECHO 10/30/2013 - Final Impressions:  1. Mild left ventricular enlargement with a mild decrease in systolic function, estimated ejection fraction 45%.  2. Mild right ventricular enlargement with normal systolic function.  3. Mild left atrial enlargement.  4. Mild mitral regurgitation.  5. Trace tricuspid regurgitation.  6. Mild left ventricular diastolic dysfunction.  7. When compared t*     Chronic kidney disease, stage III (moderate)     2014     Essential (primary) hypertension     No Comments Provided     Hyperlipidemia     No Comments Provided     Low back pain     Chronic low back pain secondary to injury, , with chronic pain syndrome.     Other cardiomyopathies (CODE)     2013,Diagnosed by angio and cath study 2013 at [facility]. EF of 35% at first study     Personal history of other medical treatment (CODE)      with all vaginal deliveries     Raynaud's syndrome without gangrene     No Comments Provided     Tinnitus     No Comments Provided     Ventricular premature depolarization     3/25/2013     Ventricular tachycardia (H)     3/4/2014      Past Surgical History:   Procedure Laterality Date     ARTHROSCOPY SHOULDER Right     Shoulder surgery for impingement     ARTHROSCOPY SHOULDER Left      Left  shoulder surgery for impingement     ARTHROSCOPY SHOULDER  08/2009    and debridement, distal clavical excision     Cardiac ablation for pvc      not successful     COLONOSCOPY      4/24/06,Inflammation noted, no further issues     COLONOSCOPY  07/28/2016 7/28/2016,wnl no need f/u     DILATION AND CURETTAGE      D&C for retained placentas x2     FINGER SURGERY Left 10/1989    fourth finger, cyst removal     HYSTERECTOMY TOTAL ABDOMINAL, BILATERAL SALPINGO-OOPHORECTOMY, COMBINED      1991,Soren-Clifton procedure     MAMMOPLASTY AUGMENTATION      1983     REPAIR BLADDER      7/1999, with [facility] and Juan Miguel procedure with cystoscopy     Family History   Problem Relation Age of Onset     Heart Disease Mother         Heart Disease,CHF     Hypertension Mother         Hypertension     Other - See Comments Mother         rheumatic fever     Other - See Comments Father         Old age     Breast Cancer Sister         Cancer-breast     Heart Disease Brother         Heart Disease,MI     Blood Disease No family hx of         Blood Disease,no clotting disorders     Social History     Tobacco Use     Smoking status: Never Smoker     Smokeless tobacco: Never Used     Tobacco comment: Quit smoking: 3-4 diet coke daily   Substance Use Topics     Alcohol use: No     Alcohol/week: 0.0 standard drinks     Social History     Social History Narrative    . Gerardo - .  Retired from [employer] as a -retired 1/18/2012.  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    Children: Fariba Umana, 1963, Ravindra, ND, Shreya Esquivel, 1965, Dominic Dwyer, 1967, Fouzia Dwyer, 1968, Miles     Current Outpatient Medications   Medication Sig Dispense Refill     amiodarone (PACERONE/CODARONE) 100 MG TABS tablet Take 150 mg by mouth daily Take 1 and 1/2 tablet by mouth daily 135 tablet 3     amLODIPine (NORVASC) 2.5 MG tablet Take 2.5 mg by mouth daily        ascorbic acid (VITAMIN C) 500 MG tablet  "Take 500 mg by mouth daily        aspirin EC 81 MG EC tablet Take 81 mg by mouth daily        Calcium Carbonate-Vitamin D (CALCIUM 500 + D) 500-125 MG-UNIT TABS Take 1 tablet by mouth 2 times daily        fish oil-omega-3 fatty acids 1000 MG capsule Take 2 g by mouth 2 times daily       furosemide (LASIX) 40 MG tablet Take 40 mg by mouth daily        glucosamine-chondroitin 500-400 MG CAPS per capsule Take 1 capsule by mouth 2 times daily       metoprolol succinate (TOPROL-XL) 50 MG 24 hr tablet TAKE 1 TABLET BY MOUTH EVERY DAY 30 tablet 0     Multiple Vitamin (MULTI-VITAMINS) TABS Take 1 tablet by mouth       potassium chloride ER (KLOR-CON M) 20 MEQ CR tablet Take 1 tablet (20 mEq) by mouth 2 times daily 180 tablet 3     rosuvastatin (CRESTOR) 40 MG tablet TAKE 1 TABLET (40 MG) BY MOUTH AT BEDTIME 90 tablet 1     spironolactone (ALDACTONE) 25 MG tablet TAKE 1/2 TABLET BY MOUTH DAILY 45 tablet 4     Allergies   Allergen Reactions     Codeine GI Disturbance     \"gets sick\" per patient      Sulfa Drugs Unknown     Other reaction(s): *Unknown - Childhood Rxn per her mother       Review of Systems   Constitutional: Negative for chills, fatigue and fever.   Respiratory: Positive for shortness of breath. Negative for cough.    Cardiovascular: Positive for peripheral edema.   Psychiatric/Behavioral: Negative for mood changes. The patient is not nervous/anxious.         OBJECTIVE:     /78 (BP Location: Right arm, Patient Position: Sitting, Cuff Size: Adult Regular)   Pulse 64   Temp 97.5  F (36.4  C) (Tympanic)   Resp 16   Ht 1.626 m (5' 4\")   Wt 72.6 kg (160 lb)   LMP  (LMP Unknown)   SpO2 97%   Breastfeeding No   BMI 27.46 kg/m    Body mass index is 27.46 kg/m .  Physical Exam  Constitutional:       Appearance: Normal appearance.   HENT:      Head: Normocephalic.   Eyes:      Extraocular Movements: Extraocular movements intact.      Pupils: Pupils are equal, round, and reactive to light.   Neck:      " Musculoskeletal: Normal range of motion and neck supple.   Cardiovascular:      Rate and Rhythm: Normal rate and regular rhythm.      Pulses: Normal pulses.      Heart sounds: No murmur.   Pulmonary:      Effort: Pulmonary effort is normal.      Breath sounds: Normal breath sounds. No wheezing, rhonchi or rales.   Musculoskeletal:      Right lower leg: Edema (trace) present.      Left lower leg: Edema (trace) present.   Lymphadenopathy:      Cervical: No cervical adenopathy.   Neurological:      Mental Status: She is alert.   Psychiatric:         Mood and Affect: Mood normal.         Behavior: Behavior normal.           PHQ-9 SCORE 12/23/2015 4/26/2016 8/28/2017   PHQ-9 Total Score 5 9 0       PHQ-2 Score:     PHQ-2 ( 1999 Pfizer) 2/12/2021 1/12/2021   Q1: Little interest or pleasure in doing things 0 0   Q2: Feeling down, depressed or hopeless 0 0   PHQ-2 Score 0 0       SANGEETA-7 SCORE 7/22/2020   Total Score 0         No flowsheet data found.      I personally reviewed results withpatient as listed below:   Diagnostic Test Results:  Results for orders placed or performed in visit on 02/12/21 (from the past 24 hour(s))   Magnesium   Result Value Ref Range    Magnesium 2.1 1.9 - 2.7 mg/dL   TSH Reflex GH   Result Value Ref Range    TSH Reflex 3.67 0.34 - 5.60 IU/mL   UA reflex to Microscopic and Culture    Specimen: Midstream Urine   Result Value Ref Range    Color Urine Yellow     Appearance Urine Clear     Glucose Urine Negative NEG^Negative mg/dL    Bilirubin Urine Negative NEG^Negative    Ketones Urine Negative NEG^Negative mg/dL    Specific Gravity Urine 1.006 1.003 - 1.035    Blood Urine Negative NEG^Negative    pH Urine 5.0 5.0 - 7.0 pH    Protein Albumin Urine Negative NEG^Negative mg/dL    Urobilinogen mg/dL Normal 0.0 - 2.0 mg/dL    Nitrite Urine Negative NEG^Negative    Leukocyte Esterase Urine Negative NEG^Negative    Source Midstream Urine      Last Comprehensive Metabolic Panel:  Sodium   Date Value Ref  Range Status   02/12/2021 143 134 - 144 mmol/L Final     Potassium   Date Value Ref Range Status   02/12/2021 4.0 3.5 - 5.1 mmol/L Final     Chloride   Date Value Ref Range Status   02/12/2021 104 98 - 107 mmol/L Final     Carbon Dioxide   Date Value Ref Range Status   02/12/2021 28 21 - 31 mmol/L Final     Anion Gap   Date Value Ref Range Status   02/12/2021 11 3 - 14 mmol/L Final     Glucose   Date Value Ref Range Status   02/12/2021 100 70 - 105 mg/dL Final     Urea Nitrogen   Date Value Ref Range Status   02/12/2021 17 7 - 25 mg/dL Final     Creatinine   Date Value Ref Range Status   02/12/2021 1.39 (H) 0.60 - 1.20 mg/dL Final     GFR Estimate   Date Value Ref Range Status   02/12/2021 37 (L) >60 mL/min/[1.73_m2] Final     Calcium   Date Value Ref Range Status   02/12/2021 9.9 8.6 - 10.3 mg/dL Final       ASSESSMENT/PLAN:       ICD-10-CM    1. Idiopathic cardiomyopathy (H)  I42.8 Echocardiogram Complete   2. Essential hypertension  I10    3. Stage 3b chronic kidney disease  N18.32 NEPHROLOGY ADULT REFERRAL     UA reflex to Microscopic and Culture   4. Muscle twitch  R25.3 Magnesium     Magnesium     CANCELED: Magnesium   5. Hot flashes  R23.2    6. Screening for thyroid disorder  Z13.29 TSH Reflex GH     TSH Reflex GH       1.  She is still having some edema and shortness of breath.  With her history of idiopathic cardiomyopathy, would recommend repeating echocardiogram to ensure that there are no significant changes, particularly with having had covid in the past few months.    2.  Blood pressure is stable.  No change to her medications at this time.  3.  Her creatinine has crept up over the past few months and has persisted.  Urinalysis today was normal.  She is referred to nephrology.  4.  Magnesium level was normal.  Sodium and potassium normal as well.  5.  She had CTs of her chest, neck and abdomen/pelvis in October when she had been to the Emergency Department.  No concerning lymphadenopathy was seen at  that time.    6.  TSH was obtained as well given her symptoms of hot flashes, muscle twitching, etc.    Roxana Maki MD  [facility]    Portions of this dictation were created using the Dragon Nuance voice recognition system. Proofreading was completed but there may be errors in text.

## 2021-02-26 ENCOUNTER — HOSPITAL ENCOUNTER (OUTPATIENT)
Dept: CARDIOLOGY | Facility: OTHER | Age: 76
Discharge: HOME | End: 2021-02-26
Attending: FAMILY MEDICINE | Admitting: FAMILY MEDICINE
Payer: COMMERCIAL

## 2021-02-26 DIAGNOSIS — I42.9 IDIOPATHIC CARDIOMYOPATHY (H): ICD-10-CM

## 2021-02-26 PROCEDURE — 93306 TTE W/DOPPLER COMPLETE: CPT | Mod: 26 | Performed by: INTERNAL MEDICINE

## 2021-02-26 PROCEDURE — 255N000002 HC RX 255 OP 636: Performed by: STUDENT IN AN ORGANIZED HEALTH CARE EDUCATION/TRAINING PROGRAM

## 2021-02-26 RX ADMIN — PERFLUTREN 4 ML: 6.52 INJECTION, SUSPENSION INTRAVENOUS at 12:42

## 2021-03-02 ENCOUNTER — TRANSFERRED RECORDS (OUTPATIENT)
Dept: HEALTH INFORMATION MANAGEMENT | Facility: OTHER | Age: 76
End: 2021-03-02

## 2021-04-05 ENCOUNTER — HOSPITAL ENCOUNTER (OUTPATIENT)
Dept: CT IMAGING | Facility: OTHER | Age: 76
Discharge: HOME | End: 2021-04-05
Attending: INTERNAL MEDICINE | Admitting: INTERNAL MEDICINE
Payer: COMMERCIAL

## 2021-04-05 DIAGNOSIS — R91.8 PULMONARY NODULES: ICD-10-CM

## 2021-04-05 PROCEDURE — 71250 CT THORAX DX C-: CPT

## 2021-04-27 ENCOUNTER — OFFICE VISIT (OUTPATIENT)
Dept: PULMONOLOGY | Facility: OTHER | Age: 76
End: 2021-04-27
Attending: INTERNAL MEDICINE
Payer: COMMERCIAL

## 2021-04-27 ENCOUNTER — TRANSFERRED RECORDS (OUTPATIENT)
Dept: HEALTH INFORMATION MANAGEMENT | Facility: OTHER | Age: 76
End: 2021-04-27

## 2021-04-27 VITALS
DIASTOLIC BLOOD PRESSURE: 62 MMHG | HEART RATE: 60 BPM | WEIGHT: 169 LBS | SYSTOLIC BLOOD PRESSURE: 118 MMHG | OXYGEN SATURATION: 96 % | BODY MASS INDEX: 28.85 KG/M2 | TEMPERATURE: 96.7 F | RESPIRATION RATE: 18 BRPM | HEIGHT: 64 IN

## 2021-04-27 DIAGNOSIS — R91.8 PULMONARY NODULES: Primary | ICD-10-CM

## 2021-04-27 PROCEDURE — G0463 HOSPITAL OUTPT CLINIC VISIT: HCPCS

## 2021-04-27 ASSESSMENT — PAIN SCALES - GENERAL: PAINLEVEL: NO PAIN (0)

## 2021-04-27 ASSESSMENT — MIFFLIN-ST. JEOR: SCORE: 1241.58

## 2021-04-28 NOTE — PROGRESS NOTES
History of Present Illness  This is a [facility] Pulmonary Medicine outreach note.  The patient is a 75-year-old female who returns today in follow-up for abnormal chest CT and dyspnea.  She was last seen by me on 2/2/2021.    She returns today denying any respiratory symptoms.  She does report that she had COVID 19 in the fall and developed significant weakness, nausea, and vomiting from it.  Overall, she lost 30 lb.  She does have some dyspnea with exertion as she tries to get her strength and wind back.  When she is short of breath, she denies any coughing or wheezing.    Tests obtain since her last visit include:    4/5/2021 CT chest is personally reviewed and compared with a previous chest CT done on 1/20/2020.  I agree with radiology interpretation in no change in her right lower lobe pulmonary nodule.  Radiology measures at at 1.7 x 1.0 cm.  No other significant pulmonary findings are seen.  Radiology does report multiple gallstones.    Previous tests include:    10/11/2020 CT chest shows a right lower lobe 1.7 cm nodule that is not significantly changed.    2/27/2020 lab work which showed an ESR 14, negative NATALIE, negative rheumatoid factor, negative CCP, negative ANCA, negative fungal serologies to Histoplasma, Blastomyces, and Coccidioides.    4/24/2020 CT chest which is personally reviewed and compared with her previous chest CT done on 1/20/2020.  I agree with radiology interpretation of no change in her right lower lobe nodule which they measure at 16.6 x 10.3 mm.  No new findings are seen.    In review, the patient developed rib fractures in November, 2019 prompting imaging of her chest which revealed a lung nodule as outlined below.  She denies any traumatic event that caused a rib fractures.  She had been lifting heavier objects, and she suspects that they may have been the cause.  She denies any sleep walking or getting out of bed at night.  She has had no falls.    She does report some stable  dyspnea with exertion occasionally having to stop to catch her breath climbing stairs or hills.  She denies any coughing, wheezing, chest pain, or lightheadedness.    She denies any past pulmonary history.  She has no history of asthma or seasonal or environmental allergies.  She does have a history of nonischemic cardiomyopathy but denies any history of autoimmune conditions.    She is a lifelong nonsmoker.  She has traveled to [place] and [place] as well as to [place].  She has never traveled to the [facility].  She has a dog at home but no birds or exotic pets.  She has no hot tubs in the home.  She has no known tuberculosis exposure, but she does report that her paternal grandmother  of tuberculosis.  She has been tested for TB in the past and found negative.  She worked as a .  Family history is positive for tuberculosis.    Outside workup includes:    2020 chest CT which is personally reviewed.  I agree with radiology interpretation of a 0.6 x 1.6 cm oval nodule in the right lower lobe.  She has mild bronchiectasis distal to this nodule.  No mediastinal or hilar adenopathy is seen.  Radiology reports a small sliding hiatal hernia.    2020 PET scan was obtained which again shows a 0.9 x 1.5 cm right lower lobe nodule with an SUV uptake of 3.16 which is estimated as low/borderline.  They also saw uptake in the right 8th and 9th ribs representing healing fractures.  No other areas of uptake were seen.        Home Medications     - Last Reconciled 21 by Heidi Sharma, Bryson Chin    acetaminophen 500 mg tablet (Tylenol Extra Strength)  500 mg PO Q6H PRN  amiodarone 100 mg tablet  100 mg PO DAILY  amlodipine 2.5 mg tablet  2.5 mg PO DAILY  ascorbate calcium (vitamin C) 500 mg tablet  500 mg PO DAILY  aspirin 81 mg tablet,delayed release  81 mg PO DAILY  calcium carbonate-vitamin D3 600 mg calcium-200 unit capsule (Calcium 600 + D(3))  caps PO BID  diphenhydramine 25  mg-acetaminophen 500 mg/15 mL oral solution   mL PO HS  fish, borage, flaxseed oils-omega 3,6,9 cb #1 400 mg-400 mg-400 mg cap (Omega 3-6-9 Complex)  1 cap PO BID  furosemide 40 mg tablet  40 mg PO DAILY  glucosamine sulfate 500 mg tablet (Glucosamine)  500 mg PO BID  metoprolol tartrate 50 mg tablet  50 mg PO DAILY  multivitamin   1 cap PO DAILY  potassium chloride 20 mEq oral packet (Klor-Con)  20 mEq PO DAILY  rosuvastatin 40 mg tablet (Crestor)  40 mg PO DAILY  spironolactone 25 mg tablet  12.5 mg PO DAILY    Allergies    Sulfa (Sulfonamide Antibiotics) Allergy (Mild, Verified 04/28/21 09:01)  unknown  codeine Allergy (Unknown, Verified 04/28/21 09:01)  Unknown    PFSH  PFSH:     Social History (Reviewed 04/28/20 @ 08:59 by Lilian Redd, Med Assist)  Smoking Status:  Never smoker  second hand exposure:  No       ROS  Constitutional:   Reports daytime sleepiness, fatigue, night sweats and weight loss;  Denies chills, difficulty sleeping, fever(s), headache(s), snoring or weight gain    Eyes:   Denies change in vision    ENT:   Denies vertigo, headache(s), hearing problems or snoring    Cardiovascular:   Denies chest pain, lightheadedness or dyspnea    Respiratory:   Denies cough, hemoptysis, excessive phlegm production, dyspnea, snoring or wheezing    Gastrointestinal:   Denies constipation, diarrhea, nausea or vomiting    Urinary:   Denies dysuria    Musculoskeletal:   Reports swelling;  Denies myalgias, arthralgias, muscle weakness or stiffness    Integumentary:   Reports change in nails;  Denies skin change, change in hair or rash    Neurological:   Denies lightheadedness, vertigo, headache(s), memory loss or seizures    Psychiatric:   Denies anxiety, depression or memory loss    Endocrine:   Denies fatigue or thyroid disease    Allergic/Immunologic:   Denies dyspnea, wheezing, neck lymphadenopathy or rash      Vital Signs      04/28/21  09:01  Height    5 ft 4 in  Height (cm)    162.6  Weight    76.657  kg  Weight (lb)    169 lbs and  0.0  ozs  BMI    29.0  BSA    1.82  BP    118/62  Blood Pressure Location    Right Arm  Position    Sitting  Respiration    18  Pulse Rate    60  Pulse Oximetry (%)    96  Oxygen Delivery Method    room air  Pain Scale (0-10)    0    Exam  Physical Exam:   General:  Alert and oriented, appears comfortable on room air.    Head: Normocephalic and atraumatic.    Eyes: Pupils equal, round, reactive to light, sclera are clear.    Face: Facial muscles are symmetric.    Oral cavity:  She wore a mask during the visit.    Neck: Supple and without adenopathy.    Respiratory/Chest: Normal chest rise and respiratory excursion.  Clear to auscultation and percussion bilaterally. No wheezes, rhonchi, or rales noted.    Extremities: No cyanosis, clubbing, or edema noted.    Cardiovascular: Regular rate and rhythm, normal S1 and S2, no murmurs, gallops, or rubs.    Lymph nodes: No cervical, supraclavicular adenopathy noted.    Neurologic: Moves all extremities and ambulates without difficulty.     A&P  Assessment & Plan  (1) Other nonspecific abnormal finding of lung field:        Status: Acute        Code(s):  R91.8 - Other nonspecific abnormal finding of lung field        Abnormal chest CT.  The patient has a right lower lobe 1.7 x 1.0 cm lobulated nodule that has shown stability over 1 year from January, 2020 through April, 2021.  PET scanning has shown it to have a borderline/low metabolic uptake.  She has had right-sided rib fractures of undetermined cause.  There was no evidence of them being pathologic fractures on the PET scan.  Workup for underlying autoimmune or fungal causes have been negative.     As her nodule has shown stability over a year, I will repeat her chest CT without contrast in 1 year.  She will follow-up with me at [facility] at that time.  If there is no change in her right lower lobe pulmonary nodule at that point, she would need no further follow-up.        (2) Dyspnea on  exertion:        Status: Acute        Code(s):  R06.09 - Other forms of dyspnea        Dyspnea on exertion.  This is due to deconditioning from her COVID 19 infection.  I have encouraged her to continue to exercise and strengthen.  (3) Non-smoker:        Status: Acute        Code(s):  Z78.9 - Other specified health status  Departure  Follow Up:      1 Year ([place])

## 2021-05-18 ENCOUNTER — OFFICE VISIT (OUTPATIENT)
Dept: FAMILY MEDICINE | Facility: OTHER | Age: 76
End: 2021-05-18
Attending: FAMILY MEDICINE
Payer: COMMERCIAL

## 2021-05-18 VITALS
SYSTOLIC BLOOD PRESSURE: 130 MMHG | HEART RATE: 71 BPM | RESPIRATION RATE: 20 BRPM | BODY MASS INDEX: 28.85 KG/M2 | TEMPERATURE: 97.7 F | WEIGHT: 169 LBS | HEIGHT: 64 IN | DIASTOLIC BLOOD PRESSURE: 80 MMHG

## 2021-05-18 DIAGNOSIS — M79.662 PAIN IN BOTH LOWER LEGS: ICD-10-CM

## 2021-05-18 DIAGNOSIS — M79.661 PAIN IN BOTH LOWER LEGS: ICD-10-CM

## 2021-05-18 DIAGNOSIS — G25.3 MYOCLONIC JERKING: ICD-10-CM

## 2021-05-18 DIAGNOSIS — H91.90 PERCEIVED HEARING CHANGES: ICD-10-CM

## 2021-05-18 DIAGNOSIS — R07.81 RIB PAIN ON LEFT SIDE: Primary | ICD-10-CM

## 2021-05-18 PROCEDURE — G0463 HOSPITAL OUTPT CLINIC VISIT: HCPCS

## 2021-05-18 PROCEDURE — 99214 OFFICE O/P EST MOD 30 MIN: CPT | Performed by: FAMILY MEDICINE

## 2021-05-18 ASSESSMENT — ENCOUNTER SYMPTOMS
NERVOUS/ANXIOUS: 0
NUMBNESS: 0
COUGH: 0
PALPITATIONS: 0
PARESTHESIAS: 0
CHILLS: 0
FATIGUE: 0
SHORTNESS OF BREATH: 0

## 2021-05-18 ASSESSMENT — PAIN SCALES - GENERAL: PAINLEVEL: NO PAIN (0)

## 2021-05-18 ASSESSMENT — MIFFLIN-ST. JEOR: SCORE: 1241.58

## 2021-05-18 NOTE — NURSING NOTE
"Chief Complaint   Patient presents with     RECHECK     ribs/ follow up pulmonology      Patient fell in October, had covid, broken ribs . She did see pulmonology in April. She has questions for the doctor regarding her sister having a stroke and other concerns.   Initial /80 (BP Location: Right arm, Patient Position: Sitting, Cuff Size: Adult Regular)   Pulse 71   Temp 97.7  F (36.5  C) (Tympanic)   Resp 20   Ht 1.626 m (5' 4\")   Wt 76.7 kg (169 lb)   LMP  (LMP Unknown)   BMI 29.01 kg/m   Estimated body mass index is 29.01 kg/m  as calculated from the following:    Height as of this encounter: 1.626 m (5' 4\").    Weight as of this encounter: 76.7 kg (169 lb).  Medication Reconciliation: complete    Suha Diehl LPN  "

## 2021-05-18 NOTE — PROGRESS NOTES
"  SUBJECTIVE:   Nursing Notes:   Suha Diehl LPN  5/18/2021  3:37 PM  Sign at exiting of workspace  Chief Complaint   Patient presents with     RECHECK     ribs/ follow up pulmonology      Patient fell in October, had covid, broken ribs . She did see pulmonology in April. She has questions for the doctor regarding her sister having a stroke and other concerns.   Initial /80 (BP Location: Right arm, Patient Position: Sitting, Cuff Size: Adult Regular)   Pulse 71   Temp 97.7  F (36.5  C) (Tympanic)   Resp 20   Ht 1.626 m (5' 4\")   Wt 76.7 kg (169 lb)   LMP  (LMP Unknown)   BMI 29.01 kg/m   Estimated body mass index is 29.01 kg/m  as calculated from the following:    Height as of this encounter: 1.626 m (5' 4\").    Weight as of this encounter: 76.7 kg (169 lb).  Medication Reconciliation: complete    Suha Diehl LPN      Hailey Shah is a 75 year old female who presents to clinic today for discussion of a few things.    Her sister, who is 74 just had a stroke.  She was able to regain all of her function.  Her sister has atrial fibrillation.    Had fallen last October and broke her left 6th rib. Continues to have a discomfort in the lower ribs of this side.    Has a sensation of a swooshing in her head.  Lasts a few minutes, then goes away.  Not a ringing in her ears.      Legs still feel like they on fire.  Her feet are not affected.  Her symptoms started since she had covid.  Wears compression stockings.  Thinks there may be a little swelling.  At night, her veins are all distended.  The vein distension is better by morning.  No back pain.    Has had a tic a couple of months after she fell and after having had covid-19.  Her whole body will start twitching.  Happens when she is falling asleep at times as well.      HPI    I personally reviewed medications/allergies/history listed below:    Patient Active Problem List    Diagnosis Date Noted     Hypokalemia 11/17/2020     Priority: Medium "     Vasovagal syncope 11/17/2020     Priority: Medium     Elevated troponin 11/17/2020     Priority: Medium     Acute cystitis with hematuria 11/17/2020     Priority: Medium     Community acquired pneumonia of right lower lobe of lung 11/17/2020     Priority: Medium     Family history of ischemic heart disease 01/16/2018     Priority: Medium     Overview:   Brother had fatal myocardial infarction at age 60.       Lumbago 01/16/2018     Priority: Medium     Overview:   Chronic low back pain secondary to injury, 1995, with chronic pain syndrome.       Hyperlipidemia 08/28/2017     Priority: Medium     Overview:   IMO Update 10/11       Essential hypertension 08/28/2017     Priority: Medium     Overview:   IMO Update       PVC's (premature ventricular contractions) 12/14/2016     Priority: Medium     At risk for amiodarone toxicity with long term use 06/09/2016     Priority: Medium     Arthralgia of right hip 04/26/2016     Priority: Medium     Idiopathic cardiomyopathy (H) 09/24/2014     Priority: Medium     CKD (chronic kidney disease) stage 3, GFR 30-59 ml/min 02/24/2014     Priority: Medium     Raynaud's disease 01/25/2012     Priority: Medium     Arthropathy of shoulder region 05/28/2010     Priority: Medium     Overview:   IMO Update 10/11       RCT (rotator cuff tear) 05/28/2010     Priority: Medium     Overview:   IMO Update 10/11       Obesity 11/12/2008     Priority: Medium     Overview:   Updated per 10/1/17 IMO import       Raynaud's phenomenon (by history or observed) 11/12/2008     Priority: Medium     Overview:   IMO Update 10/11       Past Medical History:   Diagnosis Date     Chronic diastolic heart failure (H)     2/24/2014,ECHO 10/30/2013 - Final Impressions:  1. Mild left ventricular enlargement with a mild decrease in systolic function, estimated ejection fraction 45%.  2. Mild right ventricular enlargement with normal systolic function.  3. Mild left atrial enlargement.  4. Mild mitral  regurgitation.  5. Trace tricuspid regurgitation.  6. Mild left ventricular diastolic dysfunction.  7. When compared t*     Chronic kidney disease, stage III (moderate)     2014     Essential (primary) hypertension     No Comments Provided     Hyperlipidemia     No Comments Provided     Low back pain     Chronic low back pain secondary to injury, , with chronic pain syndrome.     Other cardiomyopathies (CODE)     2013,Diagnosed by angio and cath study 2013 at [facility]. EF of 35% at first study     Personal history of other medical treatment (CODE)      with all vaginal deliveries     Raynaud's syndrome without gangrene     No Comments Provided     Tinnitus     No Comments Provided     Ventricular premature depolarization     3/25/2013     Ventricular tachycardia (H)     3/4/2014      Past Surgical History:   Procedure Laterality Date     ARTHROSCOPY SHOULDER Right     Shoulder surgery for impingement     ARTHROSCOPY SHOULDER Left      Left shoulder surgery for impingement     ARTHROSCOPY SHOULDER  2009    and debridement, distal clavical excision     Cardiac ablation for pvc      not successful     COLONOSCOPY      06,Inflammation noted, no further issues     COLONOSCOPY  2016,wnl no need f/u     DILATION AND CURETTAGE      D&C for retained placentas x2     FINGER SURGERY Left 10/1989    fourth finger, cyst removal     HYSTERECTOMY TOTAL ABDOMINAL, BILATERAL SALPINGO-OOPHORECTOMY, COMBINED      ,Soren-Clifton procedure     MAMMOPLASTY AUGMENTATION           REPAIR BLADDER      1999, with [facility] and Juan Miguel procedure with cystoscopy     Family History   Problem Relation Age of Onset     Heart Disease Mother         Heart Disease,CHF     Hypertension Mother         Hypertension     Other - See Comments Mother         rheumatic fever     Other - See Comments Father         Old age     Cerebrovascular Disease Sister         age 72     Breast Cancer Sister          "Cancer-breast     Heart Disease Brother         Heart Disease,MI     Blood Disease No family hx of         Blood Disease,no clotting disorders     Social History     Tobacco Use     Smoking status: Never Smoker     Smokeless tobacco: Never Used     Tobacco comment: Quit smoking: 3-4 diet coke daily   Substance Use Topics     Alcohol use: No     Alcohol/week: 0.0 standard drinks     Social History     Social History Narrative    . Gerardo - .  Retired from [employer] as a -retired 1/18/2012.  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    Children: Fariba Umana, 1963, Ravindra, [place], Shreya Esquivel, 1965, Tricia Hong, Dominic Shah, 1967, [place]lak[place]laFouzia, 1968, [place]     Current Outpatient Medications   Medication Sig Dispense Refill     amiodarone (PACERONE/CODARONE) 100 MG TABS tablet Take 150 mg by mouth daily Take 1 and 1/2 tablet by mouth daily 135 tablet 3     ascorbic acid (VITAMIN C) 500 MG tablet Take 500 mg by mouth daily        aspirin EC 81 MG EC tablet Take 81 mg by mouth daily        Calcium Carbonate-Vitamin D (CALCIUM 500 + D) 500-125 MG-UNIT TABS Take 1 tablet by mouth 2 times daily        fish oil-omega-3 fatty acids 1000 MG capsule Take 2 g by mouth 2 times daily       furosemide (LASIX) 40 MG tablet Take 40 mg by mouth daily        glucosamine-chondroitin 500-400 MG CAPS per capsule Take 1 capsule by mouth 2 times daily       metoprolol succinate (TOPROL-XL) 50 MG 24 hr tablet TAKE 1 TABLET BY MOUTH EVERY DAY 30 tablet 0     Multiple Vitamin (MULTI-VITAMINS) TABS Take 1 tablet by mouth       potassium chloride ER (KLOR-CON M) 20 MEQ CR tablet Take 1 tablet (20 mEq) by mouth 2 times daily 180 tablet 3     rosuvastatin (CRESTOR) 40 MG tablet TAKE 1 TABLET (40 MG) BY MOUTH AT BEDTIME 90 tablet 1     spironolactone (ALDACTONE) 25 MG tablet TAKE 1/2 TABLET BY MOUTH DAILY 45 tablet 4     Allergies   Allergen Reactions     Codeine GI Disturbance     \"gets sick\" per " "patient      Sulfa Drugs Unknown     Other reaction(s): *Unknown - Childhood Rxn per her mother       Review of Systems   Constitutional: Negative for chills and fatigue.   Respiratory: Negative for cough and shortness of breath.    Cardiovascular: Negative for palpitations.   Neurological: Negative for numbness and paresthesias.   Psychiatric/Behavioral: Negative for mood changes. The patient is not nervous/anxious.         OBJECTIVE:     /80 (BP Location: Right arm, Patient Position: Sitting, Cuff Size: Adult Regular)   Pulse 71   Temp 97.7  F (36.5  C) (Tympanic)   Resp 20   Ht 1.626 m (5' 4\")   Wt 76.7 kg (169 lb)   LMP  (LMP Unknown)   BMI 29.01 kg/m    Body mass index is 29.01 kg/m .  Physical Exam  Constitutional:       Appearance: Normal appearance.   HENT:      Head: Normocephalic.      Right Ear: Tympanic membrane normal.      Left Ear: Tympanic membrane normal.      Mouth/Throat:      Mouth: Mucous membranes are moist.      Pharynx: Oropharynx is clear. No oropharyngeal exudate or posterior oropharyngeal erythema.   Eyes:      Extraocular Movements: Extraocular movements intact.      Pupils: Pupils are equal, round, and reactive to light.   Neck:      Musculoskeletal: Normal range of motion and neck supple.   Cardiovascular:      Rate and Rhythm: Normal rate and regular rhythm.      Heart sounds: Normal heart sounds. No murmur.   Pulmonary:      Effort: Pulmonary effort is normal.      Breath sounds: Normal breath sounds. No wheezing, rhonchi or rales.   Musculoskeletal:      Right lower leg: No edema.      Left lower leg: No edema.      Comments: Varicosities noticed in both legs.   Lymphadenopathy:      Cervical: No cervical adenopathy.   Neurological:      Mental Status: She is alert.   Psychiatric:         Mood and Affect: Mood normal.         Behavior: Behavior normal.           PHQ-9 SCORE 12/23/2015 4/26/2016 8/28/2017   PHQ-9 Total Score 5 9 0       PHQ-2 Score:     PHQ-2 ( 1999 " Pf[employer]) 5/18/2021 4/27/2021   Q1: Little interest or pleasure in doing things 0 0   Q2: Feeling down, depressed or hopeless 0 0   PHQ-2 Score 0 0       SANGEETA-7 SCORE 7/22/2020   Total Score 0         No flowsheet data found.      I personally reviewed results withpatient as listed below:   Diagnostic Test Results:  none     ASSESSMENT/PLAN:       ICD-10-CM    1. Rib pain on left side  R07.81    2. Perceived hearing changes  H91.90    3. Pain in both lower legs  M79.661     M79.662    4. Myoclonic jerking  G25.3        1.  She had prior imaging showing rib fractures.  She wants no further intervention right now.  2.  Ears look normal.  Discussed could consider MRI of brain if she wishes.  She declines for now.  She may let us know if she changes her mind.  Could also consider ENT evaluation.  3.  May just be related to the varicosities themselves.  Without involvement of her feet, doubt neuropathy as a cause.  She will continue to wear compression stockings.  Consider vein consult if worsening.  4.  Question whether this is a sequela of covid.  She did not experience this prior to having had covid.  She does not want any further treatment at this time.  If worsening, could consider trial of clonazepam.    Roxana Maki MD  [facility] AND [facility]

## 2021-06-09 DIAGNOSIS — E78.2 MIXED HYPERLIPIDEMIA: ICD-10-CM

## 2021-06-09 NOTE — TELEPHONE ENCOUNTER
[facility] sent Rx request for the following:     Requested Prescriptions   Pending Prescriptions Disp Refills     rosuvastatin (CRESTOR) 40 MG tablet [Pharmacy Med Name: ROSUVASTATIN 40MG TABLET] 90 tablet 1     Sig: TAKE 1 TABLET (40 MG) BY MOUTH AT BEDTIME        Last Prescription Date:   12/11/2021  Last Fill Qty/Refills:         90, R-1    Last Office Visit:              5/18/2021  Future Office visit:               Routing refill request to provider for review/approval because:  Labs not current:       Statins Protocol Failed - 6/9/2021  1:02 AM        Failed - LDL on file in past 12 months     Recent Labs   Lab Test 04/26/16  1115   LDL 85             Unable to complete prescription refill per RN Medication Refill Policy.................... Melvi Figueroa RN ....................  6/9/2021   10:02 AM

## 2021-06-10 RX ORDER — ROSUVASTATIN CALCIUM 40 MG/1
40 TABLET, COATED ORAL AT BEDTIME
Qty: 90 TABLET | Refills: 1 | Status: SHIPPED | OUTPATIENT
Start: 2021-06-10 | End: 2021-12-10

## 2021-07-16 ENCOUNTER — TRANSFERRED RECORDS (OUTPATIENT)
Dept: HEALTH INFORMATION MANAGEMENT | Facility: OTHER | Age: 76
End: 2021-07-16

## 2021-07-19 ENCOUNTER — LAB (OUTPATIENT)
Dept: LAB | Facility: OTHER | Age: 76
End: 2021-07-19
Attending: INTERNAL MEDICINE
Payer: COMMERCIAL

## 2021-07-19 DIAGNOSIS — Z91.89 AT RISK FOR AMIODARONE TOXICITY WITH LONG TERM USE: ICD-10-CM

## 2021-07-19 DIAGNOSIS — I49.3 VENTRICULAR PREMATURE BEATS: ICD-10-CM

## 2021-07-19 DIAGNOSIS — Z79.899 AT RISK FOR AMIODARONE TOXICITY WITH LONG TERM USE: ICD-10-CM

## 2021-07-19 DIAGNOSIS — Z91.89 PERSONAL HISTORY OF POISONING, PRESENTING HAZARDS TO HEALTH: ICD-10-CM

## 2021-07-19 DIAGNOSIS — Z79.899 ENCOUNTER FOR LONG-TERM (CURRENT) USE OF OTHER MEDICATIONS: ICD-10-CM

## 2021-07-19 LAB
ALT SERPL W P-5'-P-CCNC: 16 U/L (ref 7–52)
AST SERPL W P-5'-P-CCNC: 22 U/L (ref 13–39)
HOLD SPECIMEN: NORMAL
T4 FREE SERPL-MCNC: 0.84 NG/DL (ref 0.6–1.6)
TSH SERPL DL<=0.005 MIU/L-ACNC: 4.23 MU/L (ref 0.4–4)

## 2021-07-19 PROCEDURE — 84439 ASSAY OF FREE THYROXINE: CPT | Mod: ZL

## 2021-07-19 PROCEDURE — 84443 ASSAY THYROID STIM HORMONE: CPT | Mod: ZL

## 2021-07-19 PROCEDURE — 84450 TRANSFERASE (AST) (SGOT): CPT | Mod: ZL

## 2021-07-19 PROCEDURE — 84460 ALANINE AMINO (ALT) (SGPT): CPT | Mod: ZL

## 2021-07-19 PROCEDURE — 36415 COLL VENOUS BLD VENIPUNCTURE: CPT | Mod: ZL

## 2021-09-22 DIAGNOSIS — E87.6 HYPOKALEMIA: ICD-10-CM

## 2021-09-22 NOTE — TELEPHONE ENCOUNTER
sent Rx request for the following:     Requested Prescriptions   Pending Prescriptions Disp Refills     potassium chloride ER (KLOR-CON M) 20 MEQ CR tablet [Pharmacy Med Name: POTASSIUM CL 20MEQ ER TABLET] 180 tablet 2     Sig: TAKE 1 TABLET (20 MEQ) BY MOUTH 2 TIMES DAILY      Last Prescription Date:   12/7/2020  Last Fill Qty/Refills:         180, R-3    Last Office Visit:              5/18/2021  Future Office visit:           none    Routing refill request to provider for review/approval because:  Review rx refill.     Unable to complete prescription refill per RN Medication Refill Policy.................... Melvi Figueroa RN ....................  9/22/2021   10:35 AM

## 2021-09-23 RX ORDER — POTASSIUM CHLORIDE 1500 MG/1
20 TABLET, EXTENDED RELEASE ORAL 2 TIMES DAILY
Qty: 180 TABLET | Refills: 2 | Status: SHIPPED | OUTPATIENT
Start: 2021-09-23 | End: 2022-06-09

## 2021-10-01 DIAGNOSIS — M25.551 ARTHRALGIA OF RIGHT HIP: Primary | ICD-10-CM

## 2021-10-04 ENCOUNTER — OFFICE VISIT (OUTPATIENT)
Dept: ORTHOPEDICS | Facility: OTHER | Age: 76
End: 2021-10-04
Attending: ORTHOPAEDIC SURGERY
Payer: COMMERCIAL

## 2021-10-04 ENCOUNTER — HOSPITAL ENCOUNTER (OUTPATIENT)
Dept: GENERAL RADIOLOGY | Facility: OTHER | Age: 76
End: 2021-10-04
Attending: ORTHOPAEDIC SURGERY
Payer: COMMERCIAL

## 2021-10-04 VITALS
RESPIRATION RATE: 18 BRPM | BODY MASS INDEX: 28.85 KG/M2 | HEIGHT: 64 IN | HEART RATE: 61 BPM | WEIGHT: 169 LBS | OXYGEN SATURATION: 98 % | SYSTOLIC BLOOD PRESSURE: 152 MMHG | DIASTOLIC BLOOD PRESSURE: 80 MMHG

## 2021-10-04 DIAGNOSIS — M53.3 SI (SACROILIAC) JOINT DYSFUNCTION: Primary | ICD-10-CM

## 2021-10-04 DIAGNOSIS — M25.551 ARTHRALGIA OF RIGHT HIP: ICD-10-CM

## 2021-10-04 PROCEDURE — 99203 OFFICE O/P NEW LOW 30 MIN: CPT | Performed by: ORTHOPAEDIC SURGERY

## 2021-10-04 PROCEDURE — 73502 X-RAY EXAM HIP UNI 2-3 VIEWS: CPT

## 2021-10-04 PROCEDURE — G0463 HOSPITAL OUTPT CLINIC VISIT: HCPCS | Mod: 25 | Performed by: ORTHOPAEDIC SURGERY

## 2021-10-04 ASSESSMENT — MIFFLIN-ST. JEOR: SCORE: 1241.58

## 2021-10-04 ASSESSMENT — PAIN SCALES - GENERAL: PAINLEVEL: MODERATE PAIN (5)

## 2021-10-04 NOTE — PROGRESS NOTES
Visit Date: 10/04/2021    This is a 75-year-old female with right hip pain.    HISTORY OF PRESENT ILLNESS:  Hailey Shah is a 75-year-old female with right hip pain.  She states that she was doing really nothing out of the ordinary whatsoever and started having significant pain into her right hip and, more correctly, this is actually into the region of her right buttock, quite a bit medial actually and below.  As part of her history, she only really has pain when she tries to put weight on the right side and it can be quite significant when she tries to stand up and loads the right hip.  The pain does not occur on the outside of her hip.  She has no groin pain.  It is mostly quite a bit medial into the right buttock on the right side.    PHYSICAL EXAMINATION:  On examination, this is a 75-year-old female, in no acute distress, very pleasant on examination.  She has a severely antalgic gait on the right side with this pain into her right buttock every time she takes a step.  It is very difficult for her to get up from a seated position.  She is nontender to palpation over the greater trochanter, nontender to palpation over the ischial tuberosity.  She has mild pain with internal rotation of the hip at its extreme of motion, but no pain with external rotation and she has no pain with normal internal and external rotation in the seated position.  She is otherwise neuro and vascularly intact.    IMAGING:  X-ray examination of the hip and pelvis dated today shows some mild degenerative change in the lumbar spine.  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  There is degenerative change within the lower lumbar spine and upper lumbar spine as well as the SI joints.    IMPRESSION AND PLAN:  This is a 75-year-old female whom I suspect has SI joint pain.  We are going to try her on a run of ibuprofen as this is probably more of SI joint-type  pain versus short external rotator pain.  She is going to try some stretching exercises for this and if, for some reason, this does not improve, she is going to return. We may consider an MRI at that time.      Dawson Richardson MD        D: 10/04/2021   T: 10/04/2021   MT: DENITA    Name:     RILEY SANDERS  MRN:      [id number]        Account:    [id number]   :      1945           Visit Date: 10/04/2021     Document: [id number]

## 2021-10-09 ENCOUNTER — HEALTH MAINTENANCE LETTER (OUTPATIENT)
Age: 76
End: 2021-10-09

## 2021-10-18 ENCOUNTER — OFFICE VISIT (OUTPATIENT)
Dept: ORTHOPEDICS | Facility: OTHER | Age: 76
End: 2021-10-18
Attending: ORTHOPAEDIC SURGERY
Payer: COMMERCIAL

## 2021-10-18 DIAGNOSIS — M53.3 SI (SACROILIAC) JOINT DYSFUNCTION: Primary | ICD-10-CM

## 2021-10-18 PROCEDURE — 99213 OFFICE O/P EST LOW 20 MIN: CPT | Performed by: ORTHOPAEDIC SURGERY

## 2021-10-18 PROCEDURE — G0463 HOSPITAL OUTPT CLINIC VISIT: HCPCS

## 2021-10-18 NOTE — PROGRESS NOTES
"Visit Date: 10/18/2021    HISTORY OF PRESENT ILLNESS:  A 75-year-old female with significant right hip pain.  Riley is a female who has significant SI joint pain.  We gave her a run of ibuprofen secondary to the kind of high buttock pain that she had previously, and she states that the point tenderness or the really sharp pain that she had has kind of resolved and the aching that she had in the region has \"gone away,\" but the sharp point pain that she has is still kind of there, though it has improved a little bit.  Now, she has more pain going down into the groin as well and this is giving her a little bit more difficulty.    PHYSICAL EXAMINATION:  Again, she is nontender to palpation over the greater trochanter.  She has mild pain with internal rotation again, but no pain with external rotation.  She is neuro and vascularly intact otherwise.    IMAGING:  X-ray examination shows some mild osteoarthritis of bilateral hips.  Cartilage height is fairly well maintained and there are some mild degenerative change within the SI joints as well.    IMPRESSION AND PLAN:  A 75-year-old female, it is questionable at this point whether she has more of a hip-type pain or whether this is coming more from an sacroiliac joint.  At this point, we are just going to pull the trigger on an injection in the SI joint.  If that does not take care of her pain, we will consider an injection into the right hip, directly into the hip joint versus just planning a total hip arthroplasty, because if the SI joint pain does not resolve the pain, then I have to assume that it is coming from her hip joint, and we will go ahead and consider a total hip arthroplasty at that point.  All of this was expressed to her.  She understands and we are going to see her back on an as-needed basis for this.    Dawson Richardson MD        D: 10/18/2021   T: 10/18/2021   MT: md/[id number]    Name:     RILEY SANDERS  MRN:      [id number]        Account:    " [id number]   :      1945           Visit Date: 10/18/2021     Document: [id number]

## 2021-10-18 NOTE — PROGRESS NOTES
Chief Complaint   Patient presents with     RECHECK     f/u right hip pain       Ladonna King LPN

## 2021-10-26 ENCOUNTER — HOSPITAL ENCOUNTER (OUTPATIENT)
Dept: GENERAL RADIOLOGY | Facility: OTHER | Age: 76
Discharge: HOME | End: 2021-10-26
Attending: ORTHOPAEDIC SURGERY | Admitting: ORTHOPAEDIC SURGERY
Payer: COMMERCIAL

## 2021-10-26 DIAGNOSIS — M53.3 SI (SACROILIAC) JOINT DYSFUNCTION: ICD-10-CM

## 2021-10-26 PROCEDURE — 255N000002 HC RX 255 OP 636: Performed by: STUDENT IN AN ORGANIZED HEALTH CARE EDUCATION/TRAINING PROGRAM

## 2021-10-26 PROCEDURE — 27096 INJECT SACROILIAC JOINT: CPT | Mod: RT

## 2021-10-26 PROCEDURE — 250N000009 HC RX 250: Performed by: STUDENT IN AN ORGANIZED HEALTH CARE EDUCATION/TRAINING PROGRAM

## 2021-10-26 PROCEDURE — 250N000011 HC RX IP 250 OP 636: Performed by: STUDENT IN AN ORGANIZED HEALTH CARE EDUCATION/TRAINING PROGRAM

## 2021-10-26 RX ORDER — TRIAMCINOLONE ACETONIDE 40 MG/ML
40 INJECTION, SUSPENSION INTRA-ARTICULAR; INTRAMUSCULAR ONCE
Status: COMPLETED | OUTPATIENT
Start: 2021-10-26 | End: 2021-10-26

## 2021-10-26 RX ORDER — LIDOCAINE HYDROCHLORIDE 10 MG/ML
2 INJECTION, SOLUTION INFILTRATION; PERINEURAL ONCE
Status: COMPLETED | OUTPATIENT
Start: 2021-10-26 | End: 2021-10-26

## 2021-10-26 RX ORDER — BUPIVACAINE HYDROCHLORIDE 5 MG/ML
3 INJECTION, SOLUTION EPIDURAL; INTRACAUDAL ONCE
Status: COMPLETED | OUTPATIENT
Start: 2021-10-26 | End: 2021-10-26

## 2021-10-26 RX ADMIN — TRIAMCINOLONE ACETONIDE 40 MG: 40 INJECTION, SUSPENSION INTRA-ARTICULAR; INTRAMUSCULAR at 09:24

## 2021-10-26 RX ADMIN — LIDOCAINE HYDROCHLORIDE 2 ML: 10 INJECTION, SOLUTION INFILTRATION; PERINEURAL at 09:24

## 2021-10-26 RX ADMIN — BUPIVACAINE HYDROCHLORIDE 3 ML: 5 INJECTION, SOLUTION EPIDURAL; INTRACAUDAL at 09:24

## 2021-10-26 RX ADMIN — IOHEXOL 2 ML: 240 INJECTION, SOLUTION INTRATHECAL; INTRAVASCULAR; INTRAVENOUS; ORAL at 09:24

## 2021-12-07 DIAGNOSIS — E78.2 MIXED HYPERLIPIDEMIA: ICD-10-CM

## 2021-12-10 RX ORDER — ROSUVASTATIN CALCIUM 40 MG/1
40 TABLET, COATED ORAL AT BEDTIME
Qty: 90 TABLET | Refills: 1 | Status: SHIPPED | OUTPATIENT
Start: 2021-12-10 | End: 2022-06-09

## 2021-12-10 NOTE — TELEPHONE ENCOUNTER
"Dr. Maki,   LDL hasn't been done since 2016. Would you like to approve?    Relevant Results     Most Recent   Chemistry   ALT 16 (7/19/21)   AST 22 (7/19/21)   HDL Cholesterol 55 (4/26/16)   LDL Cholesterol Calculated 85 (4/26/16)   Triglycerides 108 (4/26/16)         Last Prescription Date: 6/10/21  Last Qty/Refills: 90 / 1  Last Office Visit: 5/18/21  Future Office Visit: n/a     Requested Prescriptions   Pending Prescriptions Disp Refills     rosuvastatin (CRESTOR) 40 MG tablet [Pharmacy Med Name: ROSUVASTATIN 40MG TABLET] 90 tablet 0     Sig: TAKE 1 TABLET (40 MG) BY MOUTH AT BEDTIME       Statins Protocol Failed - 12/7/2021  1:04 AM        Failed - LDL on file in past 12 months     Recent Labs   Lab Test 04/26/16  1115   LDL 85             Passed - No abnormal creatine kinase in past 12 months     No lab results found.             Passed - Recent (12 mo) or future (30 days) visit within the authorizing provider's specialty     Patient has had an office visit with the authorizing provider or a provider within the authorizing providers department within the previous 12 mos or has a future within next 30 days. See \"Patient Info\" tab in inbasket, or \"Choose Columns\" in Meds & Orders section of the refill encounter.              Passed - Medication is active on med list        Passed - Patient is age 18 or older        Passed - No active pregnancy on record        Passed - No positive pregnancy test in past 12 months             "

## 2022-01-29 ENCOUNTER — HEALTH MAINTENANCE LETTER (OUTPATIENT)
Age: 77
End: 2022-01-29

## 2022-03-08 NOTE — TELEPHONE ENCOUNTER
Called and verified patient full name and . Assisted in scheduling patient in [facility] schedule on Monday.     Josselin Bhakta LPN on 2020 at 8:49 AM      4 = No assist / stand by assistance

## 2022-03-15 DIAGNOSIS — I49.3 VENTRICULAR PREMATURE BEATS: Primary | ICD-10-CM

## 2022-03-15 DIAGNOSIS — Z79.899 AT RISK FOR AMIODARONE TOXICITY WITH LONG TERM USE: ICD-10-CM

## 2022-03-15 DIAGNOSIS — Z91.89 AT RISK FOR AMIODARONE TOXICITY WITH LONG TERM USE: ICD-10-CM

## 2022-03-15 DIAGNOSIS — Z79.899 ENCOUNTER FOR LONG-TERM (CURRENT) USE OF MEDICATIONS: ICD-10-CM

## 2022-03-16 ENCOUNTER — LAB (OUTPATIENT)
Dept: LAB | Facility: OTHER | Age: 77
End: 2022-03-16
Attending: FAMILY MEDICINE
Payer: COMMERCIAL

## 2022-03-16 DIAGNOSIS — Z91.89 AT RISK FOR AMIODARONE TOXICITY WITH LONG TERM USE: ICD-10-CM

## 2022-03-16 DIAGNOSIS — I49.3 VENTRICULAR PREMATURE BEATS: ICD-10-CM

## 2022-03-16 DIAGNOSIS — Z79.899 AT RISK FOR AMIODARONE TOXICITY WITH LONG TERM USE: ICD-10-CM

## 2022-03-16 DIAGNOSIS — Z79.899 ENCOUNTER FOR LONG-TERM (CURRENT) USE OF MEDICATIONS: ICD-10-CM

## 2022-03-16 LAB
ALT SERPL W P-5'-P-CCNC: 19 U/L (ref 7–52)
AST SERPL W P-5'-P-CCNC: 25 U/L (ref 13–39)
TSH SERPL DL<=0.005 MIU/L-ACNC: 3.64 MU/L (ref 0.4–4)

## 2022-03-16 PROCEDURE — 84460 ALANINE AMINO (ALT) (SGPT): CPT | Mod: ZL

## 2022-03-16 PROCEDURE — 84443 ASSAY THYROID STIM HORMONE: CPT | Mod: ZL

## 2022-03-16 PROCEDURE — 36415 COLL VENOUS BLD VENIPUNCTURE: CPT | Mod: ZL

## 2022-03-16 PROCEDURE — 84450 TRANSFERASE (AST) (SGOT): CPT | Mod: ZL

## 2022-04-27 ENCOUNTER — HOSPITAL ENCOUNTER (OUTPATIENT)
Dept: CT IMAGING | Facility: OTHER | Age: 77
Discharge: HOME | End: 2022-04-27
Attending: INTERNAL MEDICINE
Payer: COMMERCIAL

## 2022-04-27 ENCOUNTER — OFFICE VISIT (OUTPATIENT)
Dept: PULMONOLOGY | Facility: OTHER | Age: 77
End: 2022-04-27
Attending: INTERNAL MEDICINE
Payer: COMMERCIAL

## 2022-04-27 VITALS
RESPIRATION RATE: 16 BRPM | OXYGEN SATURATION: 98 % | DIASTOLIC BLOOD PRESSURE: 63 MMHG | WEIGHT: 185.2 LBS | TEMPERATURE: 98 F | SYSTOLIC BLOOD PRESSURE: 109 MMHG | BODY MASS INDEX: 31.79 KG/M2 | HEART RATE: 54 BPM

## 2022-04-27 DIAGNOSIS — R91.8 PULMONARY NODULES: Primary | ICD-10-CM

## 2022-04-27 DIAGNOSIS — R93.89 ABNORMAL CHEST CT: ICD-10-CM

## 2022-04-27 DIAGNOSIS — R91.8 PULMONARY NODULES: ICD-10-CM

## 2022-04-27 PROCEDURE — 71250 CT THORAX DX C-: CPT

## 2022-04-27 PROCEDURE — G0463 HOSPITAL OUTPT CLINIC VISIT: HCPCS | Mod: 25 | Performed by: INTERNAL MEDICINE

## 2022-04-27 PROCEDURE — G0463 HOSPITAL OUTPT CLINIC VISIT: HCPCS | Performed by: INTERNAL MEDICINE

## 2022-04-27 ASSESSMENT — PAIN SCALES - GENERAL: PAINLEVEL: NO PAIN (0)

## 2022-04-30 NOTE — PROGRESS NOTES
History of Present Illness  This is a [facility] Pulmonary Medicine outreach note.  The patient is a 76-year-old female who returns today in follow-up for abnormal chest CT and dyspnea.  She was last seen by me on 4/28/2021.    She returns today denying any respiratory symptoms. She is here to discuss about the recent CT scan of the chest.       Tests obtain since her last visit include:    4/27/2022 CT chest is personally reviewed and compared to her original chest CT done on 1/20/2020.  By my review, there is no change in her right lower lobe pulmonary nodule that radiology has previously measured at 1.7 x 1.0 cm.  There is an area of ground-glass in the left lower lobe that is new compared to previous.    Previous tests include:    4/5/2021 CT chest is personally reviewed and compared with a previous chest CT done on 1/20/2020.  I agree with radiology interpretation in no change in her right lower lobe pulmonary nodule.  Radiology measures at at 1.7 x 1.0 cm.  No other significant pulmonary findings are seen.  Radiology does report multiple gallstones.      10/11/2020 CT chest shows a right lower lobe 1.7 cm nodule that is not significantly changed.    2/27/2020 lab work which showed an ESR 14, negative NATALIE, negative rheumatoid factor, negative CCP, negative ANCA, negative fungal serologies to Histoplasma, Blastomyces, and Coccidioides.    4/24/2020 CT chest which is personally reviewed and compared with her previous chest CT done on 1/20/2020.  I agree with radiology interpretation of no change in her right lower lobe nodule which they measure at 16.6 x 10.3 mm.  No new findings are seen.    In review, the patient developed rib fractures in November, 2019 prompting imaging of her chest which revealed a lung nodule as outlined below.  She denies any traumatic event that caused a rib fractures.  She had been lifting heavier objects, and she suspects that they may have been the cause.  She denies any sleep walking  or getting out of bed at night.  She has had no falls.    She does report some stable dyspnea with exertion occasionally having to stop to catch her breath climbing stairs or hills.  She denies any coughing, wheezing, chest pain, or lightheadedness.    She denies any past pulmonary history.  She has no history of asthma or seasonal or environmental allergies.  She does have a history of nonischemic cardiomyopathy but denies any history of autoimmune conditions.    She is a lifelong nonsmoker.  She has traveled to [place] and [place] as well as to [place].  She has never traveled to the [facility].  She has a dog at home but no birds or exotic pets.  She has no hot tubs in the home.  She has no known tuberculosis exposure, but she does report that her paternal grandmother  of tuberculosis.  She has been tested for TB in the past and found negative.  She worked as a .  Family history is positive for tuberculosis.    Outside workup includes:    2020 chest CT which is personally reviewed.  I agree with radiology interpretation of a 0.6 x 1.6 cm oval nodule in the right lower lobe.  She has mild bronchiectasis distal to this nodule.  No mediastinal or hilar adenopathy is seen.  Radiology reports a small sliding hiatal hernia.    2020 PET scan was obtained which again shows a 0.9 x 1.5 cm right lower lobe nodule with an SUV uptake of 3.16 which is estimated as low/borderline.  They also saw uptake in the right 8th and 9th ribs representing healing fractures.  No other areas of uptake were seen.        Home Medications     - Last Reconciled 22 by Anam Grijalva MD    acetaminophen 500 mg tablet (Tylenol Extra Strength)  500 mg PO Q6H PRN  amiodarone 100 mg tablet  100 mg PO DAILY  amlodipine 2.5 mg tablet  2.5 mg PO DAILY  ascorbate calcium (vitamin C) 500 mg tablet  500 mg PO DAILY  aspirin 81 mg tablet,delayed release  81 mg PO DAILY  calcium carbonate 600 mg-vitamin D3 5 mcg (200  unit) capsule (Calcium 600 + D(3))  caps PO BID  diphenhydramine 25 mg-acetaminophen 500 mg/15 mL oral solution   mL PO HS  fish, borage, flaxseed oils-omega 3,6,9 cb #1 400 mg-400 mg-400 mg cap (Omega 3-6-9 Complex)  1 cap PO BID  furosemide 40 mg tablet  40 mg PO DAILY  glucosamine sulfate 500 mg tablet (Glucosamine)  500 mg PO BID  metoprolol tartrate 50 mg tablet  50 mg PO DAILY  multivitamin   1 cap PO DAILY  potassium chloride 20 mEq oral packet (Klor-Con)  20 mEq PO DAILY  rosuvastatin 40 mg tablet (Crestor)  40 mg PO DAILY  spironolactone 25 mg tablet  12.5 mg PO DAILY    Allergies    Sulfa (Sulfonamide Antibiotics) Allergy (Mild, Verified 04/28/22 10:58)  unknown  codeine Allergy (Unknown, Verified 04/28/22 10:58)  Unknown    PFSH  PFSH:     Social History (Reviewed 04/28/20 @ 08:59 by Lilian Redd, Med Assist)  Smoking Status:  Never smoker  second hand exposure:  No       ROS  Constitutional:   Reports daytime sleepiness and fatigue;  Denies chills, difficulty sleeping, fever(s), headache(s), night sweats, snoring, weight gain or weight loss   Eyes:   Reports change in vision   ENT:   Denies vertigo, headache(s), hearing problems or snoring   Cardiovascular:   Reports dyspnea;  Denies chest pain or lightheadedness   Respiratory:   Denies cough, hemoptysis, excessive phlegm production, dyspnea, snoring or wheezing   Gastrointestinal:   Denies constipation, diarrhea, nausea or vomiting   Urinary:   Denies dysuria   Musculoskeletal:   Denies myalgias, arthralgias, muscle weakness, stiffness or swelling   Integumentary:   Denies skin change, change in hair, change in nails or rash   Neurological:   Denies lightheadedness, vertigo, headache(s), memory loss or seizures   Psychiatric:   Denies anxiety, depression or memory loss   Endocrine:   Denies fatigue or thyroid disease   Allergic/Immunologic:   Reports dyspnea;  Denies wheezing, neck lymphadenopathy or rash     Vital Signs      04/28/22  10:57  Weight     83.915 kg  Weight (lb)    185 lbs and  0.0  ozs  BP    109/63  Blood Pressure Location    Right Arm  Position    Sitting  Respiration    16  Pulse Rate    54 L  Pulse Oximetry (%)    98  Oxygen Delivery Method    room air  Pain Scale (0-10)    0    Exam  Physical Exam:   The patient was comfortable throughout the visit and was able to converse normally without evidence of dyspnea, coughing, or wheezing.     A&P  Assessment & Plan  (1) Other nonspecific abnormal finding of lung field:        Status: Acute        Code(s):  R91.8 - Other nonspecific abnormal finding of lung field  Abnormal chest CT.  The patient has a right lower lobe 1.7 x 1.0 cm lobulated nodule that has shown stability over 2 years from January, 2020 through April, 2022.  This can be considered radiographically benign.  She does have a new area of ground-glass opacity at the left lung base that was not seen a year ago.  She is currently asymptomatic.  I recommend a 1 year CT follow-up.  She will return to see me at that time.  Her scans have not yet been read by radiology.  If their interpretation differs from mind, she will be contacted.      (2) Non-smoker:        Status: Acute        Code(s):  Z78.9 - Other specified health status  Additional Plan Details  Plan Details:  Scribed for Dr. Grijalva by Shadi on 4/27/2022. I, Dr. Grijalva, have personally read and agreed to the content.  Departure  Follow Up:      1 Year ([place])

## 2022-06-09 DIAGNOSIS — E78.2 MIXED HYPERLIPIDEMIA: ICD-10-CM

## 2022-06-09 DIAGNOSIS — E87.6 HYPOKALEMIA: ICD-10-CM

## 2022-06-09 RX ORDER — ROSUVASTATIN CALCIUM 40 MG/1
TABLET, COATED ORAL
Qty: 90 TABLET | Refills: 0 | Status: SHIPPED | OUTPATIENT
Start: 2022-06-09 | End: 2022-09-12

## 2022-06-09 RX ORDER — POTASSIUM CHLORIDE 1500 MG/1
20 TABLET, EXTENDED RELEASE ORAL 2 TIMES DAILY
Qty: 180 TABLET | Refills: 0 | Status: SHIPPED | OUTPATIENT
Start: 2022-06-09 | End: 2022-09-12

## 2022-06-09 NOTE — TELEPHONE ENCOUNTER
LMTCB to schedule annual wellness visit prior to further refills.    Monalisa Barrios on 6/9/2022 at 1:32 PM

## 2022-06-09 NOTE — TELEPHONE ENCOUNTER
"[id number] sent Rx request for the following:   potassium chloride ER (KLOR-CON M) 20 MEQ CR tablet  Sig: TAKE 1 TABLET (20 MEQ) BY MOUTH 2 TIMES DAILY    Last Prescription Date:   09/23/2021  Last Fill Qty/Refills:         180, R-2    Potassium Supplements Protocol Failed - 6/9/2022  1:03 AM       Failed - Recent (12 mo) or future (30 days) visit within the authorizing provider's department    Patient has had an office visit with the authorizing provider or a provider within the authorizing providers department within the previous 12 mos or has a future within next 30 days. See \"Patient Info\" tab in inbasket, or \"Choose Columns\" in Meds & Orders section of the refill encounter.             Failed - Normal serum potassium in past 12 months    Recent Labs   Lab Test 02/12/21  0933   POTASSIUM 4.0                   Passed - Medication is active on med list       Passed - Patient is age 18 or older     rosuvastatin (CRESTOR) 40 MG tablet  SigTAKE 1 TABLET BY MOUTH NIGHTLY AT BEDTIME    Last Prescription Date:   12/10/2021  Last Fill Qty/Refills:         90, R-1    Statins Protocol Failed - 6/9/2022  1:03 AM       Failed - LDL on file in past 12 months    Recent Labs   Lab Test 04/26/16  1115   LDL 85            Failed - Recent (12 mo) or future (30 days) visit within the authorizing provider's specialty    Patient has had an office visit with the authorizing provider or a provider within the authorizing providers department within the previous 12 mos or has a future within next 30 days. See \"Patient Info\" tab in inbasket, or \"Choose Columns\" in Meds & Orders section of the refill encounter.             Passed - No abnormal creatine kinase in past 12 months    No lab results found.            Passed - Medication is active on med list       Passed - Patient is age 18 or older       Passed - No active pregnancy on record       Passed - No positive pregnancy test in past 12 months     Last Office Visit:          "     05/18/2021 ([place])   Future Office visit:           None noted    Unable to complete prescription refill per RN Medication Refill Policy.................... Lorena Wong RN ....................  6/9/2022   9:49 AM

## 2022-06-14 NOTE — TELEPHONE ENCOUNTER
Patient scheduled for annual wellness with [facility] 8/1/22.    Monalisa Barrios on 6/14/2022 at 1:18 PM

## 2022-08-01 ENCOUNTER — OFFICE VISIT (OUTPATIENT)
Dept: FAMILY MEDICINE | Facility: OTHER | Age: 77
End: 2022-08-01
Attending: FAMILY MEDICINE
Payer: COMMERCIAL

## 2022-08-01 VITALS
HEIGHT: 64 IN | TEMPERATURE: 96.6 F | SYSTOLIC BLOOD PRESSURE: 116 MMHG | RESPIRATION RATE: 16 BRPM | HEART RATE: 50 BPM | BODY MASS INDEX: 31.48 KG/M2 | WEIGHT: 184.4 LBS | OXYGEN SATURATION: 96 % | DIASTOLIC BLOOD PRESSURE: 70 MMHG

## 2022-08-01 DIAGNOSIS — Z00.00 ENCOUNTER FOR MEDICARE ANNUAL WELLNESS EXAM: Primary | ICD-10-CM

## 2022-08-01 DIAGNOSIS — Z13.29 SCREENING FOR THYROID DISORDER: ICD-10-CM

## 2022-08-01 DIAGNOSIS — R25.3 MUSCLE TWITCHING: ICD-10-CM

## 2022-08-01 DIAGNOSIS — Z13.0 SCREENING FOR DEFICIENCY ANEMIA: ICD-10-CM

## 2022-08-01 DIAGNOSIS — E87.6 HYPOKALEMIA: ICD-10-CM

## 2022-08-01 DIAGNOSIS — I10 ESSENTIAL HYPERTENSION: ICD-10-CM

## 2022-08-01 DIAGNOSIS — G47.00 INSOMNIA, UNSPECIFIED TYPE: ICD-10-CM

## 2022-08-01 DIAGNOSIS — I77.811 ABDOMINAL AORTIC ECTASIA (H): ICD-10-CM

## 2022-08-01 DIAGNOSIS — L60.3 DYSTROPHIC NAIL: ICD-10-CM

## 2022-08-01 DIAGNOSIS — E78.5 HYPERLIPIDEMIA, UNSPECIFIED HYPERLIPIDEMIA TYPE: ICD-10-CM

## 2022-08-01 DIAGNOSIS — Z13.6 SCREENING FOR AAA (ABDOMINAL AORTIC ANEURYSM): ICD-10-CM

## 2022-08-01 PROCEDURE — G0439 PPPS, SUBSEQ VISIT: HCPCS | Performed by: FAMILY MEDICINE

## 2022-08-01 RX ORDER — TRAZODONE HYDROCHLORIDE 50 MG/1
50 TABLET, FILM COATED ORAL AT BEDTIME
Qty: 90 TABLET | Refills: 3 | Status: SHIPPED | OUTPATIENT
Start: 2022-08-01 | End: 2022-12-05

## 2022-08-01 ASSESSMENT — PAIN SCALES - GENERAL: PAINLEVEL: NO PAIN (0)

## 2022-08-01 ASSESSMENT — ENCOUNTER SYMPTOMS
HEMATURIA: 0
NERVOUS/ANXIOUS: 0
NAUSEA: 0
ABDOMINAL PAIN: 0
DIZZINESS: 1
MYALGIAS: 0
SHORTNESS OF BREATH: 1
PALPITATIONS: 0
SORE THROAT: 0
CHILLS: 0
COUGH: 0
HEARTBURN: 0
EYE PAIN: 0
FREQUENCY: 1
DYSURIA: 0
CONSTIPATION: 0
HEADACHES: 0
JOINT SWELLING: 0
HEMATOCHEZIA: 0
WEAKNESS: 0
FEVER: 0
ARTHRALGIAS: 1
PARESTHESIAS: 1

## 2022-08-01 ASSESSMENT — ACTIVITIES OF DAILY LIVING (ADL): CURRENT_FUNCTION: NO ASSISTANCE NEEDED

## 2022-08-01 NOTE — PROGRESS NOTES
"    She is at risk for lack of exercise and has been provided with information to increase physical activity for the benefit of her well-being.  The patient was counseled and encouraged to consider modifying their diet and eating habits. She was provided with information on recommended healthy diet options.  The patient was provided with written information regarding signs of hearing loss.  Information on urinary incontinence and treatment options given to patient.  Answers for HPI/ROS submitted by the patient on 8/1/2022  In general, how would you rate your overall physical health?: good  Frequency of exercise:: None  Do you usually eat at least 4 servings of fruit and vegetables a day, include whole grains & fiber, and avoid regularly eating high fat or \"junk\" foods? : No  Taking medications regularly:: Yes  Medication side effects:: Lightheadedness  Activities of Daily Living: no assistance needed  Home safety: throw rugs in the hallway, lack of grab bars in the bathroom  Hearing Impairment:: difficulty following a conversation in a noisy restaurant or crowded room, feel that people are mumbling or not speaking clearly, difficulty understanding soft or whispered speech  In the past 6 months, have you been bothered by leaking of urine?: Yes  abdominal pain: No  Blood in stool: No  Blood in urine: No  chest pain: No  chills: No  congestion: No  constipation: No  cough: No  dizziness: Yes  ear pain: No  eye pain: No  nervous/anxious: No  fever: No  frequency: Yes  genital sores: No  headaches: No  hearing loss: Yes  heartburn: No  arthralgias: Yes  joint swelling: No  peripheral edema: No  mood changes: No  myalgias: No  nausea: No  dysuria: No  palpitations: No  Skin sensation changes: Yes  sore throat: No  urgency: Yes  rash: No  shortness of breath: Yes  visual disturbance: Yes  weakness: No  In general, how would you rate your overall mental or emotional health?: good  Additional concerns today:: Yes      "

## 2022-08-01 NOTE — PROGRESS NOTES
"Nursing Notes:   Livia Josselin T, LPN  8/1/2022  9:18 AM  Signed  Chief Complaint   Patient presents with     Physical     Medicare     Is not fasting. States she has a few questions/concerns.     Medication Reconciliation: complete    Josselin Bhakta LPN        Subjective   Hailey is a 76 year old, presenting for the following health issues:  Physical (Medicare)      Has some nails that look funny.  Her left index fingernail is deformed.  It seemed to be deformed since falling and breaking her ribs in 10/2020.  Her left great toenail is thickened as well.  She had covid also in 10/2020.  She still has abnormal sense of taste, but her breathing is back to baseline.    Has had twitching in muscles when she is relaxing since falling.  It seems to be in her legs, shoulder.  Can happen anywhere in her body.  Usually a random twitch here or there.  No problems swallowing.  No weakness that she can determine.  Doesn't notice it when she is active.    She is not sleeping well.  She typically only sleeps about 5 hours.  Falls asleep ok, but wakes up and has a hard time getting back to sleep.  Takes one tylenol pm before bed, which seems to help her get to sleep.  If she wakes in the middle of the night, she does take 2 ibuprofen in the middle of the night, which helps her to get back to sleep.  No known history of obstructive sleep apnea.  She does snore some.  She has urinary frequency from having to take a diuretic.     She previously had a dilated infrarenal aorta noted on CT 11/11/2020 of 3.1 cm.    Healthy Habits:     In general, how would you rate your overall health?  Good    Frequency of exercise:  None    Do you usually eat at least 4 servings of fruit and vegetables a day, include whole grains    & fiber and avoid regularly eating high fat or \"junk\" foods?  No    Taking medications regularly:  Yes    Medication side effects:  Lightheadedness    Ability to successfully perform activities of daily living:  No " assistance needed    Home Safety:  Throw rugs in the hallway and lack of grab bars in the bathroom    Hearing Impairment:  Difficulty following a conversation in a noisy restaurant or crowded room, feel that people are mumbling or not speaking clearly and difficulty understanding soft or whispered speech    In the past 6 months, have you been bothered by leaking of urine? Yes    In general, how would you rate your overall mental or emotional health?  Good      PHQ-2 Total Score: 1    Additional concerns today:  Yes       Annual Wellness Visit  Patient has been advised of split billing requirements and indicates understanding: Yes     Are you in the first 12 months of your Medicare Part B coverage?  No    Physical Health:    See above     Mental Health:    See above   PHQ-2 Score: 1    Do you feel safe in your environment? Yes    Have you ever done Advance Care Planning? (For example, a Health Directive, POLST, or a discussion with a medical provider or your loved ones about your wishes)? Yes, patient states has an Advance Care Planning document and will bring a copy to the clinic.    Fall risk:  Fallen 2 or more times in the past year?: No  Any fall with injury in the past year?: No    Cognitive Screenin) Repeat 3 items (Leader, Season, Table)    2) Clock draw: NORMAL  3) 3 item recall: Recalls 3 objects  Results: 3 items recalled: COGNITIVE IMPAIRMENT LESS LIKELY    Mini-CogTM Copyright ROXY Das. Licensed by the author for use in [facility]; reprinted with permission (angi@.[facility]). All rights reserved.      Do you have sleep apnea, excessive snoring or daytime drowsiness?: no    Current providers sharing in care for this patient include:   Patient Care Team:  Roxana Maki MD as PCP - General (Family Practice)  Roxana Maki MD as Assigned PCP  Dawson Richardson MD as Assigned Musculoskeletal Provider    Patient has been advised of split billing requirements and indicates  "understanding: Yes      Review of Systems   Constitutional: Negative for chills and fever.   HENT: Positive for hearing loss. Negative for congestion, ear pain and sore throat.    Eyes: Positive for visual disturbance. Negative for pain.   Respiratory: Positive for shortness of breath. Negative for cough.    Cardiovascular: Negative for chest pain, palpitations and peripheral edema.   Gastrointestinal: Negative for abdominal pain, constipation, heartburn, hematochezia and nausea.   Genitourinary: Positive for frequency and urgency. Negative for dysuria, genital sores and hematuria.   Musculoskeletal: Positive for arthralgias. Negative for joint swelling and myalgias.   Skin: Negative for rash.   Neurological: Positive for dizziness and paresthesias. Negative for weakness and headaches.   Psychiatric/Behavioral: Negative for mood changes. The patient is not nervous/anxious.             Objective    /70 (BP Location: Right arm, Patient Position: Sitting, Cuff Size: Adult Regular)   Pulse 50   Temp (!) 96.6  F (35.9  C) (Tympanic)   Resp 16   Ht 1.626 m (5' 4\")   Wt 83.6 kg (184 lb 6.4 oz)   LMP  (LMP Unknown)   SpO2 96%   Breastfeeding No   BMI 31.65 kg/m    Body mass index is 31.65 kg/m .  Physical Exam   GENERAL: healthy, alert and no distress  EYES: Eyes grossly normal to inspection, PERRL and conjunctivae and sclerae normal  HENT: ear canals and TM's normal, nose and mouth without ulcers or lesions  NECK: no adenopathy, no asymmetry, masses, or scars and thyroid normal to palpation  RESP: lungs clear to auscultation - no rales, rhonchi or wheezes  BREAST: normal without masses, tenderness or nipple discharge and no palpable axillary masses or adenopathy  CV: regular rate and rhythm, normal S1 S2, no S3 or S4, no murmur, click or rub, no peripheral edema and peripheral pulses strong  ABDOMEN: soft, nontender, no hepatosplenomegaly, no masses and bowel sounds normal  MS: no gross musculoskeletal " defects noted, no edema  SKIN: no suspicious lesions or rashes.  Her left index fingernail is ridged and dystrophic.  Her left great toenail is thickened and has partial onchylolysis.  NEURO: Normal strength and tone, mentation intact and speech normal  PSYCH: mentation appears normal, affect normal/bright              Review current opioid prescriptions    For a patient with a current opioid prescription:    Reviewed potential Opioid Use Disorder (OUD) risk factors: Yes n/a    Evaluate their pain severity and current treatment plan:     Provide information on non-opioid treatment options:    Refer to a specialist, as appropriate:    Get more information on pain management in the [facility] Pain Management Best Practices Inter-agency Task Force Report    Screen for potential Substance Use Disorders (SUDs)    Reviewed the patient s potential risk factors for SUDs: Yes n/a    Refer to treatment or specialist, as appropriate:     A screening tool isn t required but you may use one:  Find more information in the National [place] on Drug Abuse Screening and Assessment Tools Chart      Assessment & Plan       ICD-10-CM    1. Encounter for Medicare annual wellness exam  Z00.00    2. Dystrophic nail  L60.3    3. Muscle twitching  R25.3 Comprehensive metabolic panel     Magnesium     Vitamin B12   4. Insomnia, unspecified type  G47.00 traZODone (DESYREL) 50 MG tablet   5. Abdominal aortic ectasia (H)   I77.811 US Aorta/Ivc/Iliac Duplex Complete   6. Hyperlipidemia, unspecified hyperlipidemia type  E78.5 Comprehensive metabolic panel     Lipid Profile   7. Essential hypertension  I10 Comprehensive metabolic panel   8. Hypokalemia  E87.6    9. Screening for thyroid disorder  Z13.29 TSH Reflex GH   10. Screening for deficiency anemia  Z13.0 CBC and Differential     1.  Mammogram was just completed in July through [facility] and was normal per her report.  Last DEXA was 6/30/2020 and was normal.  Colonoscopy on 7/28/16 was normal.  No  follow up due to age.  Pap Smear deferred due to age >65.  Abdominal aortic aneurysm screening achieved through CT on 11/11/2020.  Vaccines reviewed and are all up to date.  2.  Her fingernail could have been injured when she fell a year and a half ago.  Her toenail appears to have onychomycosis. Would not advised treatment given small chance of resolution and possible side effects.  3.  Labs as above.  Offered to order EMG and Neurology evaluation for further work up, but she declines for now.    4.  Trial of trazodone.  If not improving, consider sleep evaluation.  5.  Will recheck with ultrasound of aorta.  6.  Comprehensive Metabolic Profile and lipid profile ordered.  She will return to clinic another day fasting.  7.  Stable.  Labs ordered as above.  8.  Comprehensive Metabolic Profile as above.  9.  TSH as above.  10.  Complete Blood Count as above.        Return in about 53 weeks (around 8/7/2023) for Annual Wellness Visit.    Roxana Maki MD  [facility]

## 2022-08-01 NOTE — NURSING NOTE
Chief Complaint   Patient presents with     Physical     Medicare     Is not fasting. States she has a few questions/concerns.     Medication Reconciliation: complete    Josselin Bhakta LPN

## 2022-08-01 NOTE — PATIENT INSTRUCTIONS
Patient Education   Personalized Prevention Plan  You are due for the preventive services outlined below.  Your care team is available to assist you in scheduling these services.  If you have already completed any of these items, please share that information with your care team to update in your medical record.  Health Maintenance Due   Topic Date Due     Kidney Microalbumin Urine Test  Never done     Hepatitis C Screening  Never done     Cholesterol Lab  04/26/2017     Hemoglobin  11/30/2021     Basic Metabolic Panel  02/12/2022       Exercise for a Healthier Heart  You may wonder how you can improve the health of your heart. If you re thinking about exercise, you re on the right track. You don t need to become an athlete. But you do need a certain amount of brisk exercise to help strengthen your heart. If you have been diagnosed with a heart condition, your healthcare provider may advise exercise to help stabilize your condition. To help make exercise a habit, choose safe, fun activities.      Exercise with a friend. When activity is fun, you're more likely to stick with it.   Before you start  Check with your healthcare provider before starting an exercise program. This is especially important if you have not been active for a while. It's also important if you have a long-term (chronic) health problem such as heart disease, diabetes, or obesity. Or if you are at high risk for having these problems.   Why exercise?  Exercising regularly offers many healthy rewards. It can help you do all of the following:     Improve your blood cholesterol level to help prevent further heart trouble    Lower your blood pressure to help prevent a stroke or heart attack    Control diabetes, or reduce your risk of getting this disease    Improve your heart and lung function    Reach and stay at a healthy weight    Make your muscles stronger so you can stay active    Prevent falls and fractures by slowing the loss of bone mass  (osteoporosis)    Manage stress better    Reduce your blood pressure    Improve your sense of self and your body image  Exercise tips      Ease into your routine. Set small goals. Then build on them. If you are not sure what your activity level should be, talk with your healthcare provider first before starting an exercise routine.    Exercise on most days. Aim for a total of 150 minutes (2 hours and 30 minutes) or more of moderate-intensity aerobic activity each week. Or 75 minutes (1 hour and 15 minutes) or more of vigorous-intensity aerobic activity each week. Or try for a combination of both. Moderate activity means that you breathe heavier and your heart rate increases but you can still talk. Think about doing 40 minutes of moderate exercise, 3 to 4 times a week. For best results, activity should last for about 40 minutes to lower blood pressure and cholesterol. It's OK to work up to the 40-minute period over time. Examples of moderate-intensity activity are walking 1 mile in 15 minutes. Or doing 30 to 45 minutes of yard work.    Step up your daily activity level.  Along with your exercise program, try being more active the whole day. Walk instead of drive. Or park further away so that you take more steps each day. Do more household tasks or yard work. You may not be able to meet the advised mount of physical activity. But doing some moderate- or vigorous-intensity aerobic activity can help reduce your risk for heart disease. Your healthcare provider can help you figure out what is best for you.    Choose 1 or more activities you enjoy.  Walking is one of the easiest things you can do. You can also try swimming, riding a bike, dancing, or taking an exercise class.    When to call your healthcare provider  Call your healthcare provider if you have any of these:     Chest pain or feel dizzy or lightheaded    Burning, tightness, pressure, or heaviness in your chest, neck, shoulders, back, or arms    Abnormal  shortness of breath    More joint or muscle pain    A very fast or irregular heartbeat (palpitations)  [employer] last reviewed this educational content on 7/1/2019 2000-2021 [employer]. All rights reserved. This information is not intended as a substitute for professional medical care. Always follow your healthcare professional's instructions.          Understanding USDA MyPlate  The USDA has guidelines to help you make healthy food choices. These are called MyPlate. MyPlate shows the food groups that make up healthy meals using the image of a place setting. Before you eat, think about the healthiest choices for what to put on your plate or in your cup or bowl. To learn more about building a healthy plate, visit www.choosemyplate.gov.    The food groups    Fruits. Any fruit or 100% fruit juice counts as part of the Fruit Group. Fruits may be fresh, canned, frozen, or dried, and may be whole, cut-up, or pureed. Make 1/2 of your plate fruits and vegetables.    Vegetables. Any vegetable or 100% vegetable juice counts as a member of the Vegetable Group. Vegetables may be fresh, frozen, canned, or dried. They can be served raw or cooked and may be whole, cut-up, or mashed. Make 1/2 of your plate fruits and vegetables.    Grains. All foods made from grains are part of the Grains Group. These include wheat, rice, oats, cornmeal, and barley. Grains are often used to make foods such as bread, pasta, oatmeal, cereal, tortillas, and grits. Grains should be no more than 1/4 of your plate. At least half of your grains should be whole grains.    Protein. This group includes meat, poultry, seafood, beans and peas, eggs, processed soy products (such as tofu), nuts (including nut butters), and seeds. Make protein choices no more than 1/4 of your plate. Meat and poultry choices should be lean or low fat.    Dairy. The Dairy Group includes all fluid milk products and foods made from milk that contain calcium, such as  yogurt and cheese. (Foods that have little calcium, such as cream, butter, and cream cheese, are not part of this group.) Most dairy choices should be low-fat or fat-free.    Oils. Oils aren't a food group, but they do contain essential nutrients. However it's important to watch your intake of oils. These are fats that are liquid at room temperature. They include canola, corn, olive, soybean, vegetable, and sunflower oil. Foods that are mainly oil include mayonnaise, certain salad dressings, and soft margarines. You likely already get your daily oil allowance from the foods you eat.  Things to limit  Eating healthy also means limiting these things in your diet:       Salt (sodium). Many processed foods have a lot of sodium. To keep sodium intake down, eat fresh vegetables, meats, poultry, and seafood when possible. Purchase low-sodium, reduced-sodium, or no-salt-added food products at the store. And don't add salt to your meals at home. Instead, season them with herbs and spices such as dill, oregano, cumin, and paprika. Or try adding flavor with lemon or lime zest and juice.    Saturated fat. Saturated fats are most often found in animal products such as beef, pork, and chicken. They are often solid at room temperature, such as butter. To reduce your saturated fat intake, choose leaner cuts of meat and poultry. And try healthier cooking methods such as grilling, broiling, roasting, or baking. For a simple lower-fat swap, use plain nonfat yogurt instead of mayonnaise when making potato salad or macaroni salad.    Added sugars. These are sugars added to foods. They are in foods such as ice cream, candy, soda, fruit drinks, sports drinks, energy drinks, cookies, pastries, jams, and syrups. Cut down on added sugars by sharing sweet treats with a family member or friend. You can also choose fruit for dessert, and drink water or other unsweetened beverages.     [employer]Well last reviewed this educational content on  6/1/2020 2000-2021 [employer]. All rights reserved. This information is not intended as a substitute for professional medical care. Always follow your healthcare professional's instructions.          Signs of Hearing Loss      Hearing much better with one ear can be a sign of hearing loss.   Hearing loss is a problem shared by many people. In fact, it is one of the most common health problems, particularly as people age. Most people age 65 and older have some hearing loss. By age 80, almost everyone does. Hearing loss often occurs slowly over the years. So you may not realize your hearing has gotten worse.  Have your hearing checked  Call your healthcare provider if you:    Have to strain to hear normal conversation    Have to watch other people s faces very carefully to follow what they re saying    Need to ask people to repeat what they ve said    Often misunderstand what people are saying    Turn the volume of the television or radio up so high that others complain    Feel that people are mumbling when they re talking to you    Find that the effort to hear leaves you feeling tired and irritated    Notice, when using the phone, that you hear better with one ear than the other  [employer] last reviewed this educational content on 1/1/2020 2000-2021 [employer]. All rights reserved. This information is not intended as a substitute for professional medical care. Always follow your healthcare professional's instructions.          Urinary Incontinence, Female (Adult)   Urinary incontinence means loss of bladder control. This problem affects many women, especially as they get older. If you have incontinence, you may be embarrassed to ask for help. But know that this problem can be treated.   Types of Incontinence  There are different types of incontinence. Two of the main types are described here. You can have more than one type.     Stress incontinence. With this type, urine leaks when  pressure (stress) is put on the bladder. This may happen when you cough, sneeze, or laugh. Stress incontinence most often occurs because the pelvic floor muscles that support the bladder and urethra are weak. This can happen after pregnancy and vaginal childbirth or a hysterectomy. It can also be due to excess body weight or hormone changes.    Urge incontinence (also called overactive bladder). With this type, a sudden urge to urinate is felt often. This may happen even though there may not be much urine in the bladder. The need to urinate often during the night is common. Urge incontinence most often occurs because of bladder spasms. This may be due to bladder irritation or infection. Damage to bladder nerves or pelvic muscles, constipation, and certain medicines can also lead to urge incontinence.  Treatment depends on the cause. Further evaluation is needed to find the type you have. This will likely include an exam and certain tests. Based on the results, you and your healthcare provider can then plan treatment. Until a diagnosis is made, the home care tips below can help ease symptoms.   Home care    Do pelvic floor muscle exercises, if they are prescribed. The pelvic floor muscles help support the bladder and urethra. Many women find that their symptoms improve when doing special exercises that strengthen these muscles. To do the exercises, contract the muscles you would use to stop your stream of urine. But do this when you re not urinating. Hold for 10 seconds, then relax. Repeat 10 to 20 times in a row, at least 3 times a day. Your healthcare provider may give you other instructions for how to do the exercises and how often.    Keep a bladder diary. This helps track how often and how much you urinate over a set period of time. Bring this diary with you to your next visit with the provider. The information can help your provider learn more about your bladder problem.    Lose weight, if advised to by your  provider. Extra weight puts pressure on the bladder. Your provider can help you create a weight-loss plan that s right for you. This may include exercising more and making certain diet changes.    Don't have foods and drinks that may irritate the bladder. These can include alcohol and caffeinated drinks.    Quit smoking. Smoking and other tobacco use can lead to a long-term (chronic) cough that strains the pelvic floor muscles. Smoking may also damage the bladder and urethra. Talk with your provider about treatments or methods you can use to quit smoking.    If drinking large amounts of fluid makes you have symptoms, you may be advised to limit your fluid intake. You may also be advised to drink most of your fluids during the day and to limit fluids at night.    If you re worried about urine leakage or accidents, you may wear absorbent pads to catch urine. Change the pads often. This helps reduce discomfort. It may also reduce the risk of skin or bladder infections.    Follow-up care  Follow up with your healthcare provider, or as directed. It may take some to find the right treatment for your problem. But healthy lifestyle changes can be made right away. These include such things as exercising on a regular basis, eating a healthy diet, losing weight (if needed), and quitting smoking. Your treatment plan may include special therapies or medicines. Certain procedures or surgery may also be options. Talk about any questions you have with your provider.   When to seek medical advice  Call the healthcare provider right away if any of these occur:    Fever of 100.4 F (38 C) or higher, or as directed by your provider    Bladder pain or fullness    Belly swelling    Nausea or vomiting    Back pain    Weakness, dizziness, or fainting  [employer] last reviewed this educational content on 1/1/2020 2000-2021 [employer]. All rights reserved. This information is not intended as a substitute for professional  medical care. Always follow your healthcare professional's instructions.

## 2022-08-09 ENCOUNTER — LAB (OUTPATIENT)
Dept: LAB | Facility: OTHER | Age: 77
End: 2022-08-09
Attending: FAMILY MEDICINE
Payer: COMMERCIAL

## 2022-08-09 DIAGNOSIS — R25.3 MUSCLE TWITCHING: ICD-10-CM

## 2022-08-09 DIAGNOSIS — I10 ESSENTIAL HYPERTENSION: ICD-10-CM

## 2022-08-09 DIAGNOSIS — E78.5 HYPERLIPIDEMIA, UNSPECIFIED HYPERLIPIDEMIA TYPE: ICD-10-CM

## 2022-08-09 DIAGNOSIS — Z13.29 SCREENING FOR THYROID DISORDER: ICD-10-CM

## 2022-08-09 DIAGNOSIS — Z13.0 SCREENING FOR DEFICIENCY ANEMIA: ICD-10-CM

## 2022-08-09 DIAGNOSIS — E03.9 HYPOTHYROIDISM, UNSPECIFIED TYPE: Primary | ICD-10-CM

## 2022-08-09 LAB
ALBUMIN SERPL-MCNC: 4.3 G/DL (ref 3.5–5.7)
ALP SERPL-CCNC: 41 U/L (ref 34–104)
ALT SERPL W P-5'-P-CCNC: 15 U/L (ref 7–52)
ANION GAP SERPL CALCULATED.3IONS-SCNC: 4 MMOL/L (ref 3–14)
AST SERPL W P-5'-P-CCNC: 19 U/L (ref 13–39)
BASOPHILS # BLD AUTO: 0 10E3/UL (ref 0–0.2)
BASOPHILS NFR BLD AUTO: 1 %
BILIRUB SERPL-MCNC: 0.5 MG/DL (ref 0.3–1)
BUN SERPL-MCNC: 19 MG/DL (ref 7–25)
CALCIUM SERPL-MCNC: 9.3 MG/DL (ref 8.6–10.3)
CHLORIDE BLD-SCNC: 106 MMOL/L (ref 98–107)
CHOLEST SERPL-MCNC: 144 MG/DL
CO2 SERPL-SCNC: 29 MMOL/L (ref 21–31)
CREAT SERPL-MCNC: 1.51 MG/DL (ref 0.6–1.2)
EOSINOPHIL # BLD AUTO: 0.1 10E3/UL (ref 0–0.7)
EOSINOPHIL NFR BLD AUTO: 2 %
ERYTHROCYTE [DISTWIDTH] IN BLOOD BY AUTOMATED COUNT: 13.7 % (ref 10–15)
FASTING STATUS PATIENT QL REPORTED: YES
GFR SERPL CREATININE-BSD FRML MDRD: 35 ML/MIN/1.73M2
GLUCOSE BLD-MCNC: 93 MG/DL (ref 70–105)
HCT VFR BLD AUTO: 37.1 % (ref 35–47)
HDLC SERPL-MCNC: 70 MG/DL (ref 23–92)
HGB BLD-MCNC: 12.5 G/DL (ref 11.7–15.7)
IMM GRANULOCYTES # BLD: 0 10E3/UL
IMM GRANULOCYTES NFR BLD: 0 %
LDLC SERPL CALC-MCNC: 59 MG/DL
LYMPHOCYTES # BLD AUTO: 2.5 10E3/UL (ref 0.8–5.3)
LYMPHOCYTES NFR BLD AUTO: 40 %
MAGNESIUM SERPL-MCNC: 2 MG/DL (ref 1.9–2.7)
MCH RBC QN AUTO: 31.2 PG (ref 26.5–33)
MCHC RBC AUTO-ENTMCNC: 33.7 G/DL (ref 31.5–36.5)
MCV RBC AUTO: 93 FL (ref 78–100)
MONOCYTES # BLD AUTO: 0.4 10E3/UL (ref 0–1.3)
MONOCYTES NFR BLD AUTO: 6 %
NEUTROPHILS # BLD AUTO: 3.1 10E3/UL (ref 1.6–8.3)
NEUTROPHILS NFR BLD AUTO: 51 %
NONHDLC SERPL-MCNC: 74 MG/DL
NRBC # BLD AUTO: 0 10E3/UL
NRBC BLD AUTO-RTO: 0 /100
PLATELET # BLD AUTO: 203 10E3/UL (ref 150–450)
POTASSIUM BLD-SCNC: 3.7 MMOL/L (ref 3.5–5.1)
PROT SERPL-MCNC: 7.1 G/DL (ref 6.4–8.9)
RBC # BLD AUTO: 4.01 10E6/UL (ref 3.8–5.2)
SODIUM SERPL-SCNC: 139 MMOL/L (ref 134–144)
T4 FREE SERPL-MCNC: 0.88 NG/DL (ref 0.6–1.6)
TRIGL SERPL-MCNC: 76 MG/DL
TSH SERPL DL<=0.005 MIU/L-ACNC: 4.96 MU/L (ref 0.4–4)
VIT B12 SERPL-MCNC: 524 PG/ML (ref 180–914)
WBC # BLD AUTO: 6.1 10E3/UL (ref 4–11)

## 2022-08-09 PROCEDURE — 80061 LIPID PANEL: CPT | Mod: ZL

## 2022-08-09 PROCEDURE — 85025 COMPLETE CBC W/AUTO DIFF WBC: CPT | Mod: ZL

## 2022-08-09 PROCEDURE — 80053 COMPREHEN METABOLIC PANEL: CPT | Mod: ZL

## 2022-08-09 PROCEDURE — 83735 ASSAY OF MAGNESIUM: CPT | Mod: ZL

## 2022-08-09 PROCEDURE — 84443 ASSAY THYROID STIM HORMONE: CPT | Mod: ZL

## 2022-08-09 PROCEDURE — 36415 COLL VENOUS BLD VENIPUNCTURE: CPT | Mod: ZL

## 2022-08-09 PROCEDURE — 82607 VITAMIN B-12: CPT | Mod: ZL

## 2022-08-09 PROCEDURE — 84439 ASSAY OF FREE THYROXINE: CPT | Mod: ZL

## 2022-08-09 RX ORDER — LEVOTHYROXINE SODIUM 25 UG/1
25 TABLET ORAL DAILY
Qty: 30 TABLET | Refills: 11 | Status: SHIPPED | OUTPATIENT
Start: 2022-08-09 | End: 2023-06-14

## 2022-08-26 ENCOUNTER — HOSPITAL ENCOUNTER (OUTPATIENT)
Dept: ULTRASOUND IMAGING | Facility: OTHER | Age: 77
Discharge: HOME | End: 2022-08-26
Attending: FAMILY MEDICINE | Admitting: FAMILY MEDICINE
Payer: COMMERCIAL

## 2022-08-26 DIAGNOSIS — I77.811 ABDOMINAL AORTIC ECTASIA (H): ICD-10-CM

## 2022-08-26 DIAGNOSIS — Z13.6 SCREENING FOR AAA (ABDOMINAL AORTIC ANEURYSM): ICD-10-CM

## 2022-08-26 PROCEDURE — 76706 US ABDL AORTA SCREEN AAA: CPT

## 2022-08-30 DIAGNOSIS — I71.40 ABDOMINAL AORTIC ANEURYSM (AAA) WITHOUT RUPTURE (H): Primary | ICD-10-CM

## 2022-09-10 DIAGNOSIS — E78.2 MIXED HYPERLIPIDEMIA: ICD-10-CM

## 2022-09-10 DIAGNOSIS — E87.6 HYPOKALEMIA: ICD-10-CM

## 2022-09-11 ENCOUNTER — HEALTH MAINTENANCE LETTER (OUTPATIENT)
Age: 77
End: 2022-09-11

## 2022-09-12 RX ORDER — ROSUVASTATIN CALCIUM 40 MG/1
TABLET, COATED ORAL
Qty: 90 TABLET | Refills: 3 | Status: SHIPPED | OUTPATIENT
Start: 2022-09-12 | End: 2023-09-12

## 2022-09-12 RX ORDER — POTASSIUM CHLORIDE 1500 MG/1
TABLET, EXTENDED RELEASE ORAL
Qty: 180 TABLET | Refills: 3 | Status: SHIPPED | OUTPATIENT
Start: 2022-09-12 | End: 2023-09-12

## 2022-09-12 NOTE — TELEPHONE ENCOUNTER
Routing refill request to provider for review/approval because:    LOV; 8/1/22  Hansa Grijalva RN on 9/12/2022 at 3:48 PM

## 2022-09-16 NOTE — PROGRESS NOTES
Patient Information     Patient Name MRN Hailey Reina [id number] Female 1945      Progress Notes by Case Dooley MD at 2017  4:00 PM     Author:  Case Dooley MD Service:  (none) Author Type:  Physician     Filed:  2017  6:38 AM Encounter Date:  2017 Status:  Signed     :  Case Dooley MD (Physician)            SUBJECTIVE:    Hailey Shah is a 71 y.o. female who presents for preop consultation and clearance.    HPI Comments: Preoperative consultation is requested by Dr. Eric and Dr. Vo. This patient has chronic left knee pain and is scheduled for arthroscopy on her left knee. This will be done at [facility] and is scheduled to be done on .    The patient is currently feeling well and has no complaints. Her past medical history is most remarkable for a nonischemic cardiomyopathy. Her ejection fraction at one point in time was in the range of 30%. Her most recent ejection fraction was 58% in 2016 on echocardiogram. Initial coronary angiography in  showed no obstructive coronary disease. She even had a cardiac MRI that was unremarkable. The only other finding was frequent PVCs and nonsustained ventricular tachycardia. She underwent a catheter ablation for PVCs but this was not successful. She has since been maintained on amiodarone 100 mg daily. With this, her PVC burden is less than 2% when reviewed on a most recent Holter monitor testing. Therefore, her overall cardiac status is well evaluated, stable and improved in regards to ejection fraction and degree of PVCs. She denies any current cardiac symptoms including chest pain, shortness of breath, or bothersome more frequent PVCs. She does not have any syncope or presyncope.    In fact, her only complaint today is that of knee pain. She is anxious to have the knee surgery done as this does bother her quite a bit. She has never had a previous anesthesia complication other  than some nausea. No bleeding diathesis. No previous blood transfusion. Her stop bang score is slightly elevated at 4. She has never been formally evaluated for obstructive sleep apnea. I spent time today reconciling her medication list. Past medical history, past surgical history, family history and social history were all reviewed and updated.          Allergies     Allergen  Reactions     Sulfa (Sulfonamide Antibiotics) *Unknown - Childhood Rxn     Tylenol W Codeine [Acetaminophen-Codeine] Nausea And Vomiting   ,   Current Outpatient Prescriptions     Medication  Sig     amiodarone 100 mg tablet Take 1 tablet by mouth once daily.     amLODIPine (NORVASC) 2.5 mg tablet Take 1 tablet by mouth once daily.     ascorbic acid (VITAMIN C) 500 mg tablet Take 1 tablet by mouth once daily.     aspirin enteric coated 81 mg tablet Take 1 tablet by mouth once daily with a meal.     Calcium-Cholecalciferol, D3, (CALCIUM 500 + D, D3,) 500-125 mg-unit Tab Take 1 Tab by mouth 2 times daily.     furosemide (LASIX) 40 mg tablet Take 1 tablet by mouth every morning.     metoprolol succinate (TOPROL XL) 50 mg sustained-release tablet Take 1 tablet by mouth once daily.     multivitamin (MVI) tablet Take 1 tablet by mouth once daily.     omega-3 fatty acids-vitamin E (FISH OIL) 1,000 mg cap Take 1 capsule by mouth 2 times daily.     rosuvastatin (CRESTOR) 40 mg tablet Take 1 tablet by mouth at bedtime.     spironolactone (ALDACTONE) 25 mg tablet Take 1/2 tablet by mouth daily.     No current facility-administered medications for this visit.      Medications have been reviewed by me and are current to the best of my knowledge and ability. ,   Past Medical History:     Diagnosis  Date     Cardiomyopathy, idiopathic (HC) 1/29/2013    Diagnosed by angio and cath study 1/2013 at [facility]. EF of 35% at first study       Chronic diastolic heart failure - MILD - 10/30/2013 ECHO - EF 45% 2/24/2014    ECHO 10/30/2013 - Final Impressions:  1. Mild  left ventricular enlargement with a mild decrease in systolic function, estimated ejection fraction 45%.  2. Mild right ventricular enlargement with normal systolic function.  3. Mild left atrial enlargement.  4. Mild mitral regurgitation.  5. Trace tricuspid regurgitation.  6. Mild left ventricular diastolic dysfunction.  7. When compared to a previous transthoracic echocardiogram report from 2013 there appears to be minimal change.        CKD (chronic kidney disease) stage 3, GFR 30-59 ml/min 2014     Frequent unifocal PVCs 3/25/2013     HTN (hypertension)      Hx of pregnancy      with all vaginal deliveries      Hyperlipidemia      LOW BACK PAIN, CHRONIC     Chronic low back pain secondary to injury, , with chronic pain syndrome.       Non-sustained ventricular tachycardia (HC) 3/4/2014     RAYNAUD'S DISEASE      TINNITUS    ,   Patient Active Problem List       Diagnosis  Date Noted     Hypertension  2017     Hyperlipidemia  2017     PVC's (premature ventricular contractions)  2016     At risk for amiodarone toxicity with long term use  2016     Arthralgia of right hip  2016     Idiopathic cardiomyopathy (HC)  2014     CKD (chronic kidney disease) stage 3, GFR 30-59 ml/min  2014     Family history of ischemic heart disease       Brother had fatal myocardial infarction at age 60.        RAYNAUD'S DISEASE  2012     LOW BACK PAIN, CHRONIC       Chronic low back pain secondary to injury, , with chronic pain syndrome.          Hyperlipidemia     ,   Past Surgical History:      Procedure  Laterality Date     ABLATION      PVC ablation procedure--not successful       BLADDER REPAIR  1999     with MMK and Juan Miguel procedure with cystoscopy       BREAST AUGMENTATION       COLONOSCOPY  06    Inflammation noted, no further issues       COLONOSCOPY SCREENING  2016    wnl no need f/u       DILATION AND CURETTAGE      D&C for retained placentas x2   "     HAND FINGER SURGERY  10/89     left fourth finger       SHOULDER ARTHROSCOPY      Right shoulder surgery for impingement       SHOULDER ARTHROSCOPY       Left shoulder surgery for impingement       SHOULDER ARTHROSCOPY      and debridement, distal clavical excision       BIANCA AND BSO      Scott procedure      and   Social History       Substance Use Topics         Smoking status:   Never Smoker     Smokeless tobacco:   Never Used      Comment: 3-4 diet coke daily      Alcohol use   No     Family Status     Relation  Status     Mother  at age 78    CHF, had HTN, rheumatic fever      Father  at age 91     Daughter Alive     Daughter Alive     Son Alive     Daughter Alive     Brother Alive     Sister Alive     Brother      No Family History      Social History     Social History        Marital status:       Spouse name: N/A     Number of children:  N/A     Years of education:  N/A     Social History Main Topics         Smoking status:   Never Smoker     Smokeless tobacco:   Never Used      Comment: 3-4 diet coke daily      Alcohol use   No     Drug use:   No     Sexual activity:   Not Asked     Other Topics   Concern      Service  No     Blood Transfusions  No     Caffeine Concern  Yes     Iced tea - 3 cups/day      Occupational Exposure  No     Hobby Hazards  No     Sleep Concern  Yes     nightsweats, not sleeping as well since starting amiodarone      Stress Concern  No     Weight Concern  Yes     \"overweight and accepting it\"      Special Diet  No     Back Care  No     Exercise  No     Bike Helmet  No     Doesn't ride      Seat Belt  Yes     Self-Exams  No     Social History Narrative     , employed at [employer] as a -retired 2012    Children: Fariba Umana, 1963, SALINAS Waite, 1965, [place]                   Dominic Shah, 1967, [place]                   Fouzia Avalos, 1968, [place]    " "           REVIEW OF SYSTEMS:  Review of Systems   Constitutional: Negative for chills, diaphoresis, fever, malaise/fatigue and weight loss.   HENT: Negative for congestion, ear pain, nosebleeds, sore throat and tinnitus.    Eyes: Negative for blurred vision, double vision, photophobia, pain, discharge and redness.   Respiratory: Negative for cough, hemoptysis, sputum production, shortness of breath and wheezing.    Cardiovascular: Negative for chest pain, palpitations, orthopnea, claudication, leg swelling and PND.   Gastrointestinal: Negative for abdominal pain, blood in stool, constipation, diarrhea, heartburn, nausea and vomiting.   Genitourinary: Negative for dysuria, flank pain and hematuria.   Musculoskeletal: Negative for back pain, joint pain, myalgias and neck pain.   Skin: Negative for itching and rash.   Neurological: Negative for dizziness, tingling, tremors, speech change, loss of consciousness, weakness and headaches.   Psychiatric/Behavioral: Negative for depression, hallucinations, memory loss, substance abuse and suicidal ideas. The patient is not nervous/anxious.        OBJECTIVE:  /74  Pulse 64  Ht 1.619 m (5' 3.75\")  Wt 89.5 kg (197 lb 6.4 oz)  SpO2 92%  Breastfeeding? No  BMI 34.15 kg/m2    EXAM:   Physical Exam   Constitutional: She is well-developed, well-nourished, and in no distress. No distress.   HENT:   Head: Normocephalic.   Right Ear: External ear normal.   Left Ear: External ear normal.   Mouth/Throat: Oropharynx is clear and moist. No oropharyngeal exudate.   Eyes: Pupils are equal, round, and reactive to light.   Neck: Normal range of motion. Neck supple. Normal carotid pulses and no JVD present. Carotid bruit is not present. No tracheal deviation present. No thyromegaly present.   Cardiovascular: Normal rate, regular rhythm and normal heart sounds.  Exam reveals no gallop and no friction rub.    No murmur heard.  Pulmonary/Chest: Effort normal and breath sounds normal. " No respiratory distress. She has no wheezes. She has no rales.   Abdominal: Soft. Bowel sounds are normal. She exhibits no distension and no mass. There is no tenderness. There is no rebound and no guarding.   Musculoskeletal: She exhibits no edema.   Lymphadenopathy:     She has no cervical adenopathy.   Neurological: She is alert.   Skin: Skin is warm and dry. She is not diaphoretic.   Psychiatric: Affect normal.   Nursing note and vitals reviewed.    Results for orders placed or performed in visit on 08/28/17      BASIC METABOLIC PANEL      Result  Value Ref Range    SODIUM 141 133 - 143 mmol/L    POTASSIUM 3.4 (L) 3.5 - 5.1 mmol/L    CHLORIDE 103 98 - 107 mmol/L    CO2,TOTAL 27 21 - 31 mmol/L    ANION GAP 11 5 - 18                    GLUCOSE 105 70 - 105 mg/dL    CALCIUM 9.3 8.6 - 10.3 mg/dL    BUN 20 7 - 25 mg/dL    CREATININE 1.18 0.70 - 1.30 mg/dL    BUN/CREAT RATIO           17                    GFR if African American 55 (L) >60 ml/min/1.73m2    GFR if not African American 45 (L) >60 ml/min/1.73m2   CBC WITH AUTO DIFFERENTIAL      Result  Value Ref Range    WHITE BLOOD COUNT         8.4 4.5 - 11.0 thou/cu mm    RED BLOOD COUNT           4.17 4.00 - 5.20 mil/cu mm    HEMOGLOBIN                13.1 12.0 - 16.0 g/dL    HEMATOCRIT                38.3 33.0 - 51.0 %    MCV                       92 80 - 100 fL    MCH                       31.4 26.0 - 34.0 pg    MCHC                      34.2 32.0 - 36.0 g/dL    RDW                       13.4 11.5 - 15.5 %    PLATELET COUNT            235 140 - 440 thou/cu mm    MPV                       9.9 6.5 - 11.0 fL    NEUTROPHILS               50.6 42.0 - 72.0 %    LYMPHOCYTES               38.9 20.0 - 44.0 %    MONOCYTES                 7.3 <12.0 %    EOSINOPHILS               2.5 <8.0 %    BASOPHILS                 0.5 <3.0 %    IMMATURE GRANULOCYTES(METAS,MYELOS,PROS) 0.2 %    ABSOLUTE NEUTROPHILS      4.2 1.7 - 7.0 thou/cu mm    ABSOLUTE LYMPHOCYTES      3.3 (H) 0.9 -  2.9 thou/cu mm    ABSOLUTE MONOCYTES        0.6 <0.9 thou/cu mm    ABSOLUTE EOSINOPHILS      0.2 <0.5 thou/cu mm    ABSOLUTE BASOPHILS        0.0 <0.3 thou/cu mm    ABSOLUTE IMMATURE GRANULOCYTES(METAS,MYELOS,PROS) 0.0 <=0.3 thou/cu mm       ASSESSMENT/PLAN:    ICD-10-CM    1. PVC's (premature ventricular contractions) I49.3    2. Idiopathic cardiomyopathy (HC) I42.8    3. CKD (chronic kidney disease) stage 3, GFR 30-59 ml/min N18.3 BASIC METABOLIC PANEL      CBC WITH DIFFERENTIAL      BASIC METABOLIC PANEL      CBC WITH DIFFERENTIAL      CBC WITH AUTO DIFFERENTIAL   4. Hypertension I10    5. Hyperlipidemia, unspecified hyperlipidemia type E78.5    6. Preop examination Z01.818    7. Chronic pain of left knee M25.562      G89.29         Plan:  Her exam today is normal. She had an EKG done June 2017 by her cardiologist, Dr. Granados. This shows a sinus rhythm with a single PVC, previous inferior infarct and complete right bundle branch block. I did not repeat that today. She has not had a recent chest x-ray but without pulmonary symptoms, this is deemed not necessary preoperatively. Lab from today is acceptable. She is okay for her planned surgical procedure without contraindication. I asked her to be off of her aspirin and fish oil for 1 week prior to the procedure. Her cardiac medication should be given with a small sip of water the morning prior to the procedure. With her slightly elevated stop bang score, she should be monitored for postoperative obstructive sleep apnea.    I did suggest that she consider getting testing for amiodarone toxicity which could be done following her surgery. This would include thyroid studies and spirometry. Previous thyroid studies have been normal but she has not had a recent spirometry.           87 y/o F with PMHx of HTN, DM, chronic diastolic HF, COPD w/ chronic hypoxic/ hypercarbic respiratory failure on home O2 3L

## 2022-11-08 ENCOUNTER — OFFICE VISIT (OUTPATIENT)
Dept: SURGERY | Facility: OTHER | Age: 77
End: 2022-11-08
Attending: SURGERY
Payer: COMMERCIAL

## 2022-11-08 VITALS
TEMPERATURE: 96.8 F | OXYGEN SATURATION: 100 % | HEART RATE: 53 BPM | SYSTOLIC BLOOD PRESSURE: 122 MMHG | DIASTOLIC BLOOD PRESSURE: 62 MMHG | WEIGHT: 176 LBS | RESPIRATION RATE: 16 BRPM | BODY MASS INDEX: 30.21 KG/M2

## 2022-11-08 DIAGNOSIS — L98.8 SKIN LESION OF BREAST: Primary | ICD-10-CM

## 2022-11-08 PROCEDURE — 99202 OFFICE O/P NEW SF 15 MIN: CPT | Performed by: SURGERY

## 2022-11-08 PROCEDURE — G0463 HOSPITAL OUTPT CLINIC VISIT: HCPCS

## 2022-11-08 ASSESSMENT — PAIN SCALES - GENERAL: PAINLEVEL: NO PAIN (0)

## 2022-11-08 NOTE — NURSING NOTE
"Chief Complaint   Patient presents with     Consult     Right breast concern       Initial /62 (BP Location: Right arm, Patient Position: Sitting, Cuff Size: Adult Regular)   Pulse 53   Temp 96.8  F (36  C) (Tympanic)   Resp 16   Wt 79.8 kg (176 lb)   LMP  (LMP Unknown)   SpO2 100%   BMI 30.21 kg/m   Estimated body mass index is 30.21 kg/m  as calculated from the following:    Height as of 8/1/22: 1.626 m (5' 4\").    Weight as of this encounter: 79.8 kg (176 lb).  Medication Reconciliation: complete    At what age did you start menopause? Late 50's  What age did your menstrual cycle start? 15  Are you on or have you ever taken any hormone replacement or birth control? Birth control  How many children do you have? 4  How old were you when your first child was born? 17  Did you breast feed? no  Do you have a family history of breast cancer? Maternal aunt, paternal aunt, and sister  Ladonna King LPN..........11/8/2022  9:03 AM    "

## 2022-11-08 NOTE — PROGRESS NOTES
"OFFICE CONSULTATION NOTE  Patient Name: Hailey Shah  Address: [address] [place] [id number]  Age:76 year old  Sex: female     Primary Care Physician: Roxana Maki MD    I was requested to see this patient in consultation by  Roxana Maki MD for evaluation of right breast skin lesion. A copy of this note will be sent to Roxana Maki MD.    HPI:   The patient is 76 year old female with a new skin lesion of her right nipple. She first noted it a few months ago. It is itchy at times. There hasn't been any drainage. It has not been bleeding. There is not any scabbing or crusting. The lesion is not painful. She has no breast pain bilaterally. She has no nipple drainage bilaterally. She hasn't noted any new lump or nodule in either breast. Has a history of subpectoral breast implants many years ago.  History of breast cancer in her sister, a maternal aunt and a paternal aunt. Last mammogram was in July and was \"normal\". Has been getting screening in [place] because of a \"shadow\" on her mammogram years ago-thinks she would like to have mammograms here in the future.  The patient hasn't had biopsy performed.       CONSULTATION ASSESSMENT ANDPLAN/RECOMMENDATIONS: Skin lesion nipple of right breast  I discussed with the patient the pathophysiology of breast skin lesions and breast disease. We specifically discussed that most skin lesions are not related to breast cancer but there is a lesion called Paget's disease that can be related to breast cancer. This spot appears to be a superficial cyst and not Paget's disease. We discussed the option of monitoring it for changes or draining it. At this time the patient will continue to monitor. She will use a warm pack if it sore and topical benadryl or hydrocortisone if it is itchy. We discussed that it might spontaneously drain and that she should let us know if she is worried about it. The patient denies further questions at this time. She " will call with questions or concerns.    REVIEW OF SYSTEMS  GENERAL: No fevers or chills. Denies fatigue, recent weight loss.  HEENT:No sinus drainage. No changes with vision or hearing. No difficulty swallowing.   LYMPHATICS:  No swollen nodes in axilla, neck or groin.  CARDIOVASCULAR: Denies recent chest pain, palpitations and dyspnea on exertion.  PULMONARY: No shortness of breath or cough. No increase in sputum production.  GI: Denies melena, bright red blood in stools. No hematemesis. No constipation or diarrhea.  : No dysuria or hematuria.  SKIN: No recent rashes or ulcers elsewhere on the body.  HEMATOLOGY:  No history of easy bruising or bleeding.  ENDOCRINE:  No history of diabetes, Takes thyroid medication.  NEUROLOGY:  No history of seizures or headaches. No motor or sensory changes.  BREAST:  As above  Past Medical History:   Diagnosis Date     Chronic diastolic heart failure (H)     2014,ECHO 10/30/2013 - Final Impressions:  1. Mild left ventricular enlargement with a mild decrease in systolic function, estimated ejection fraction 45%.  2. Mild right ventricular enlargement with normal systolic function.  3. Mild left atrial enlargement.  4. Mild mitral regurgitation.  5. Trace tricuspid regurgitation.  6. Mild left ventricular diastolic dysfunction.  7. When compared t*     Chronic kidney disease, stage III (moderate) (H)     2014     Essential (primary) hypertension     No Comments Provided     Hyperlipidemia     No Comments Provided     Low back pain     Chronic low back pain secondary to injury, , with chronic pain syndrome.     Other cardiomyopathies (CODE)     2013,Diagnosed by angio and cath study 2013 at [facility]. EF of 35% at first study     Personal history of other medical treatment (CODE)      with all vaginal deliveries     Raynaud's syndrome without gangrene     No Comments Provided     Tinnitus     No Comments Provided     Ventricular premature depolarization      "3/25/2013     Ventricular tachycardia     3/4/2014     Past Surgical History:   Procedure Laterality Date     ARTHROSCOPY SHOULDER Right 1989    Shoulder surgery for impingement     ARTHROSCOPY SHOULDER Left 1988     Left shoulder surgery for impingement     ARTHROSCOPY SHOULDER  08/2009    and debridement, distal clavical excision     Cardiac ablation for pvc      not successful     COLONOSCOPY      4/24/06,Inflammation noted, no further issues     COLONOSCOPY  07/28/2016 7/28/2016,wnl no need f/u     DILATION AND CURETTAGE      D&C for retained placentas x2     FINGER SURGERY Left 10/1989    fourth finger, cyst removal     HYSTERECTOMY TOTAL ABDOMINAL, BILATERAL SALPINGO-OOPHORECTOMY, COMBINED      1991,Soren-Clifton procedure     MAMMOPLASTY AUGMENTATION      1983     REPAIR BLADDER      7/1999, with [facility] and Juan Miguel procedure with cystoscopy     Current Outpatient Medications   Medication     amiodarone (PACERONE/CODARONE) 100 MG TABS tablet     ascorbic acid (VITAMIN C) 500 MG tablet     aspirin EC 81 MG EC tablet     Calcium Carbonate-Vitamin D 500-125 MG-UNIT TABS     fish oil-omega-3 fatty acids 1000 MG capsule     furosemide (LASIX) 40 MG tablet     levothyroxine (SYNTHROID/LEVOTHROID) 25 MCG tablet     metoprolol succinate (TOPROL-XL) 50 MG 24 hr tablet     Multiple Vitamin (MULTI-VITAMINS) TABS     potassium chloride ER (KLOR-CON M) 20 MEQ CR tablet     rosuvastatin (CRESTOR) 40 MG tablet     spironolactone (ALDACTONE) 25 MG tablet     traZODone (DESYREL) 50 MG tablet     UNABLE TO FIND     No current facility-administered medications for this visit.     Allergies   Allergen Reactions     Codeine GI Disturbance     \"gets sick\" per patient      Morphine Nausea     \"gets sick\" per patient      Sulfa Drugs Unknown     Other reaction(s): *Unknown - Childhood Rxn per her mother     Family History   Problem Relation Age of Onset     Heart Disease Mother         Heart Disease,CHF     Hypertension Mother       "   Hypertension     Other - See Comments Mother         rheumatic fever     Other - See Comments Father         Old age     Cerebrovascular Disease Sister         age 72     Breast Cancer Sister         Cancer-breast     Heart Disease Brother         Heart Disease,MI     Blood Disease No family hx of         Blood Disease,no clotting disorders     Social History     Socioeconomic History     Marital status:      Spouse name: None     Number of children: None     Years of education: None     Highest education level: None   Tobacco Use     Smoking status: Never     Smokeless tobacco: Never     Tobacco comments:     Quit smoking: 3-4 diet coke daily   Vaping Use     Vaping Use: Never used   Substance and Sexual Activity     Alcohol use: No     Alcohol/week: 0.0 standard drinks     Drug use: Never     Comment: Drug use: No     Sexual activity: Yes     Partners: Male   Social History Narrative    . Gerardo - .  Retired from [employer] as a -retired 1/18/2012.  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    Children: Fariba Umana, 1963, [place], [place], Shreya Esquivel, 1965, Tricia [place], Dominic Shah, 1967, [place], Fouzia Avalos, 1968, [place]       The above history was reviewed and updated today, 11/8/2022  PHYSICAL EXAM  /62 (BP Location: Right arm, Patient Position: Sitting, Cuff Size: Adult Regular)   Pulse 53   Temp 96.8  F (36  C) (Tympanic)   Resp 16   Wt 79.8 kg (176 lb)   LMP  (LMP Unknown)   SpO2 100%   BMI 30.21 kg/m    GENERAL: Healthy appearing patient in no acute distress. Pleasant and cooperative with exam and interview.   HEENT:Head-normocephalic. Eyes-no scleral icterus. Nose-no nasal drainage. No lesions. Mouth-oral mucosa pink and moist, no lesions.  NECK: Supple. No thyroid nodules. Trachea midline.  LYMPHATICS:  No cervical, axillary or supraclavicular adenopathy.  SKIN: Pink, warm and dry. No jaundice. No rash.  NEURO:  Cranial nerves II-XII grossly  intact.Alert and oriented.  PSYCH: Appropriate mood and affect.  BREAST: Breasts were examined in the seated. Superficial white papule noted right nipple. No erythema or induration. No fluctuance. No eschar or excoriation. No nipple discharge bilaterally. No tenderness noted.    IMAGING/LAB  I personally reviewed patient's [facility] screening mammogram report from 7/6/22.

## 2022-11-08 NOTE — PATIENT INSTRUCTIONS
"Ok to use topical benadryl for the itching If the spot gets sore, can use a warm compress. If it drains, it will probably be \"cheesy white drainage\" and will stop after it scabs up. Please call if you have concerns or questions!   You will get a letter for a screening mammogram in late June, early July.   "

## 2022-12-03 DIAGNOSIS — E03.9 HYPOTHYROIDISM, UNSPECIFIED TYPE: ICD-10-CM

## 2022-12-05 ENCOUNTER — OFFICE VISIT (OUTPATIENT)
Dept: FAMILY MEDICINE | Facility: OTHER | Age: 77
End: 2022-12-05
Attending: FAMILY MEDICINE
Payer: COMMERCIAL

## 2022-12-05 VITALS
HEART RATE: 68 BPM | OXYGEN SATURATION: 94 % | SYSTOLIC BLOOD PRESSURE: 136 MMHG | DIASTOLIC BLOOD PRESSURE: 80 MMHG | RESPIRATION RATE: 16 BRPM | BODY MASS INDEX: 30.48 KG/M2 | TEMPERATURE: 97.5 F | WEIGHT: 177.6 LBS

## 2022-12-05 DIAGNOSIS — H66.92 LEFT OTITIS MEDIA, UNSPECIFIED OTITIS MEDIA TYPE: ICD-10-CM

## 2022-12-05 DIAGNOSIS — G47.00 INSOMNIA, UNSPECIFIED TYPE: Primary | ICD-10-CM

## 2022-12-05 DIAGNOSIS — J40 BRONCHITIS: ICD-10-CM

## 2022-12-05 PROCEDURE — 99213 OFFICE O/P EST LOW 20 MIN: CPT | Performed by: FAMILY MEDICINE

## 2022-12-05 PROCEDURE — G0463 HOSPITAL OUTPT CLINIC VISIT: HCPCS

## 2022-12-05 RX ORDER — AZITHROMYCIN 250 MG/1
TABLET, FILM COATED ORAL
Qty: 6 TABLET | Refills: 0 | Status: SHIPPED | OUTPATIENT
Start: 2022-12-05 | End: 2023-01-16

## 2022-12-05 RX ORDER — DOXEPIN 6 MG/1
6 TABLET, FILM COATED ORAL
Qty: 90 TABLET | Refills: 3 | Status: SHIPPED | OUTPATIENT
Start: 2022-12-05 | End: 2023-10-03

## 2022-12-05 RX ORDER — LEVOTHYROXINE SODIUM 25 UG/1
25 TABLET ORAL DAILY
Qty: 30 TABLET | Refills: 11 | OUTPATIENT
Start: 2022-12-05

## 2022-12-05 ASSESSMENT — PAIN SCALES - GENERAL: PAINLEVEL: MILD PAIN (2)

## 2022-12-05 NOTE — TELEPHONE ENCOUNTER
levothyroxine (SYNTHROID/LEVOTHROID) 25 MCG tablet 30 tablet 11 8/9/2022  --   Sig - Route: Take 1 tablet (25 mcg) by mouth daily -      To [facility].  Should have refills    Hansa Grijalva RN on 12/5/2022 at 1:46 PM

## 2022-12-05 NOTE — PROGRESS NOTES
Nursing Notes:   Denise Bhaktahel JOVITA ALONSO  12/5/2022  9:52 AM  Sign at exiting of workspace  Chief Complaint   Patient presents with     Recheck Medication     Here today to discuss her sleeping pill and throat concerns.     Medication Reconciliation: complete    Josselin Whitebg, LPN    Dillon Hart is a 76 year old, presenting for the following health issues:  Recheck Medication    She had wanted to discuss her insomnia.  She is on trazodone.  She is able to fall asleep, but doesn't stay asleep.  She often will be able to sleep until 1-2 am, wakes and then often cannot get back to sleep.  She does snore.  No known witnessed apnea.      Has had an upper respiratory infection for a couple of weeks.  Has had a lot of head congestion with it.  Cough is better, but still is coughing a fair amount.  Still has some runny nose and laryngitis.  Cough is still productive.    History of Present Illness       Reason for visit:  Lack of sleep   cold  Symptom onset:  3-4 weeks ago    She eats 0-1 servings of fruits and vegetables daily.She consumes 0 sweetened beverage(s) daily. She exercises with enough effort to increase her heart rate 3 or less days per week.   She is taking medications regularly.       Med Check       Review of Systems   Constitutional, HEENT, cardiovascular, pulmonary, GI, , musculoskeletal, neuro, skin, endocrine and psych systems are negative, except as otherwise noted.      Objective    /80   Pulse 68   Temp 97.5  F (36.4  C) (Tympanic)   Resp 16   Wt 80.6 kg (177 lb 9.6 oz)   LMP  (LMP Unknown)   SpO2 94%   Breastfeeding No   BMI 30.48 kg/m    Body mass index is 30.48 kg/m .  Physical Exam  Constitutional:       Appearance: Normal appearance.   HENT:      Head: Normocephalic.      Ears:      Comments: Moderate injection of left TM with redness and distortion.  Her right TM is without redness noted.     Nose: Nose normal. No congestion or rhinorrhea.      Mouth/Throat:      Mouth:  Mucous membranes are moist.      Pharynx: Oropharynx is clear. No oropharyngeal exudate or posterior oropharyngeal erythema.   Eyes:      Pupils: Pupils are equal, round, and reactive to light.   Cardiovascular:      Rate and Rhythm: Normal rate and regular rhythm.      Pulses: Normal pulses.      Heart sounds: Normal heart sounds. No murmur heard.  Pulmonary:      Effort: Pulmonary effort is normal.      Breath sounds: Normal breath sounds. No wheezing, rhonchi or rales.   Musculoskeletal:      Cervical back: Normal range of motion and neck supple.      Right lower leg: No edema.      Left lower leg: No edema.   Lymphadenopathy:      Cervical: No cervical adenopathy.   Neurological:      Mental Status: She is alert.   Psychiatric:         Mood and Affect: Mood normal.         Behavior: Behavior normal.        Assessment & Plan   Hailey Shah is a 76 year old, presenting for the following health issues:      ICD-10-CM    1. Insomnia, unspecified type  G47.00 doxepin (SILENOR) 6 MG tablet      2. Bronchitis  J40 azithromycin (ZITHROMAX) 250 MG tablet      3. Left otitis media, unspecified otitis media type  H66.92 azithromycin (ZITHROMAX) 250 MG tablet        1.  Trial of Doxepin for sleep instead of trazodone.  Discussed that there is certainly a possibility that she has underlying obstructive sleep apnea and any sleep aid she uses likely will not be effective unless this is treated.  She declines referral for sleep evaluation at this time.    2.  Treat with zithromax as above.  Follow up if symptoms are worsening or not imrpoving.  3.  See #2.          No follow-ups on file.    Roxana Maki MD  [facility] AND \[facility] [place] [facility]\""

## 2022-12-05 NOTE — NURSING NOTE
Chief Complaint   Patient presents with     Recheck Medication     Here today to discuss her sleeping pill and throat concerns.     Medication Reconciliation: complete    Josselin Bhakta LPN

## 2023-01-16 ENCOUNTER — HOSPITAL ENCOUNTER (OUTPATIENT)
Dept: GENERAL RADIOLOGY | Facility: OTHER | Age: 78
Discharge: HOME | End: 2023-01-16
Attending: FAMILY MEDICINE
Payer: COMMERCIAL

## 2023-01-16 ENCOUNTER — OFFICE VISIT (OUTPATIENT)
Dept: FAMILY MEDICINE | Facility: OTHER | Age: 78
End: 2023-01-16
Attending: FAMILY MEDICINE
Payer: COMMERCIAL

## 2023-01-16 VITALS
HEART RATE: 70 BPM | TEMPERATURE: 98.2 F | SYSTOLIC BLOOD PRESSURE: 120 MMHG | DIASTOLIC BLOOD PRESSURE: 66 MMHG | BODY MASS INDEX: 31.51 KG/M2 | WEIGHT: 184.6 LBS | OXYGEN SATURATION: 94 % | RESPIRATION RATE: 18 BRPM | HEIGHT: 64 IN

## 2023-01-16 DIAGNOSIS — M25.572 PAIN IN JOINT INVOLVING ANKLE AND FOOT, LEFT: Primary | ICD-10-CM

## 2023-01-16 DIAGNOSIS — M25.572 PAIN IN JOINT INVOLVING ANKLE AND FOOT, LEFT: ICD-10-CM

## 2023-01-16 PROCEDURE — G0463 HOSPITAL OUTPT CLINIC VISIT: HCPCS

## 2023-01-16 PROCEDURE — 73610 X-RAY EXAM OF ANKLE: CPT | Mod: LT

## 2023-01-16 PROCEDURE — 99213 OFFICE O/P EST LOW 20 MIN: CPT | Performed by: FAMILY MEDICINE

## 2023-01-16 ASSESSMENT — PAIN SCALES - GENERAL: PAINLEVEL: NO PAIN (1)

## 2023-01-16 NOTE — NURSING NOTE
"Chief Complaint   Patient presents with     Ankle Pain     Left ankle pain for a long time but getting worse   She has been having pain in her left ankle for a long time but now the pain is keeping her up at night.  Latasha Cummings LPN..................1/16/2023   2:04 PM      Initial /66   Pulse 70   Temp 98.2  F (36.8  C) (Temporal)   Resp 18   Ht 1.613 m (5' 3.5\")   Wt 83.7 kg (184 lb 9.6 oz)   LMP  (LMP Unknown)   SpO2 94%   Breastfeeding No   BMI 32.19 kg/m   Estimated body mass index is 32.19 kg/m  as calculated from the following:    Height as of this encounter: 1.613 m (5' 3.5\").    Weight as of this encounter: 83.7 kg (184 lb 9.6 oz).  Medication Reconciliation: complete    FOOD SECURITY SCREENING QUESTIONS  Hunger Vital Signs:  Within the past 12 months we worried whether our food would run out before we got money to buy more. Never  Within the past 12 months the food we bought just didn't last and we didn't have money to get more. Never        Advance care directive on file? no  Advance care directive provided to patient? States she has one     Latasha Cummings LPN  "

## 2023-01-16 NOTE — PROGRESS NOTES
" Nursing Notes:   Latasha Cummings LPN  1/16/2023  2:04 PM  Sign at exiting of workspace  Chief Complaint   Patient presents with     Ankle Pain     Left ankle pain for a long time but getting worse   She has been having pain in her left ankle for a long time but now the pain is keeping her up at night.  Latasha Cummings LPN..................1/16/2023   2:04 PM      Initial /66   Pulse 70   Temp 98.2  F (36.8  C) (Temporal)   Resp 18   Ht 1.613 m (5' 3.5\")   Wt 83.7 kg (184 lb 9.6 oz)   LMP  (LMP Unknown)   SpO2 94%   Breastfeeding No   BMI 32.19 kg/m   Estimated body mass index is 32.19 kg/m  as calculated from the following:    Height as of this encounter: 1.613 m (5' 3.5\").    Weight as of this encounter: 83.7 kg (184 lb 9.6 oz).  Medication Reconciliation: complete    FOOD SECURITY SCREENING QUESTIONS  Hunger Vital Signs:  Within the past 12 months we worried whether our food would run out before we got money to buy more. Never  Within the past 12 months the food we bought just didn't last and we didn't have money to get more. Never        Advance care directive on file? no  Advance care directive provided to patient? States she has one     Latasha Cummings LPN        Subjective   Hailey is a 77 year old, presenting for the following health issues:  Ankle Pain (Left ankle pain for a long time but getting worse)    Hailey is here today with a complaint of left ankle pain.  During the day, it isn't too bad.  Has a tight feeling and is uncomfortable to walk.  Occasionally has shooting pain into her left medial malleolus region.  At night, pain is very bad and shoots up into her lower leg.  Has a burning/shooting/sharp quality. Has cramping with it as well and needs to get out of bed and walk on it.  She had fallen a couple of years ago in to her flower bed and thinks she hurt it at that time.      Ankle Pain    History of Present Illness       Reason for visit:  Sore ankle  Symptom onset:  3-4 " "weeks ago    She eats 0-1 servings of fruits and vegetables daily.She consumes 0 sweetened beverage(s) daily.She exercises with enough effort to increase her heart rate 9 or less minutes per day.  She exercises with enough effort to increase her heart rate 3 or less days per week. She is missing 1 dose(s) of medications per week.             Review of Systems   Constitutional, HEENT, cardiovascular, pulmonary, GI, , musculoskeletal, neuro, skin, endocrine and psych systems are negative, except as otherwise noted.      Objective    /66   Pulse 70   Temp 98.2  F (36.8  C) (Temporal)   Resp 18   Ht 1.613 m (5' 3.5\")   Wt 83.7 kg (184 lb 9.6 oz)   LMP  (LMP Unknown)   SpO2 94%   Breastfeeding No   BMI 32.19 kg/m    Body mass index is 32.19 kg/m .  Physical Exam  Constitutional:       Appearance: Normal appearance.   HENT:      Head: Normocephalic.   Musculoskeletal:      Right lower leg: No edema.      Left lower leg: No edema.      Comments: Left ankle:  No swelling noted.  Pain over her medial > lateral malleoli.  No pain over the 5th metatarsal or the navicular bone.  No bruising noted.   Neurological:      Mental Status: She is alert.   Psychiatric:         Mood and Affect: Mood normal.         Behavior: Behavior normal.       Results for orders placed or performed during the hospital encounter of 01/16/23   XR Ankle Left G/E 3 Views     Status: None    Narrative    Exam: XR ANKLE LEFT G/E 3 VIEWS     History:Female, age 77 years, Pain in joint involving ankle and foot,  left    Comparison:  No relevant prior imaging.    Technique: Three views are submitted.    Findings: Bones are osteopenic. No evidence of acute or subacute  fracture.  No evidence of dislocation.           Impression    Impression:  No evidence of acute or subacute bony abnormality.     Well-corticated bony fragment suggests old injury to the deltoid  ligament.    Generalized osteopenia.    Mild degenerative changes within the " midfoot and hindfoot without  moderate size plantar spur of the calcaneus.    JUAREZ ADAMS MD         SYSTEM ID:  [id number]         Assessment & Plan   Hailey Shah is a 77 year old, presenting for the following health issues:      ICD-10-CM    1. Pain in joint involving ankle and foot, left  M25.572 XR Ankle Left G/E 3 Views        1.  Degenerative changes noted on x-ray.  Suspect that underlying osteoarthritic changes are likely contributing to her symptoms, but discussed could consider further imaging with MRI as well as referral to podiatry if she wishes for further evaluation.  She declines at this time, but will let me know if she changes her mind.      Encouraged weight loss and regular exercise.     No follow-ups on file.    Roxana Maki MD  [facility] AND [facility]

## 2023-02-27 ENCOUNTER — OFFICE VISIT (OUTPATIENT)
Dept: FAMILY MEDICINE | Facility: OTHER | Age: 78
End: 2023-02-27
Attending: FAMILY MEDICINE
Payer: COMMERCIAL

## 2023-02-27 VITALS
HEART RATE: 68 BPM | OXYGEN SATURATION: 97 % | DIASTOLIC BLOOD PRESSURE: 72 MMHG | WEIGHT: 186 LBS | TEMPERATURE: 98.7 F | BODY MASS INDEX: 32.43 KG/M2 | SYSTOLIC BLOOD PRESSURE: 124 MMHG | RESPIRATION RATE: 18 BRPM

## 2023-02-27 DIAGNOSIS — M25.572 PAIN IN JOINT INVOLVING ANKLE AND FOOT, LEFT: Primary | ICD-10-CM

## 2023-02-27 PROCEDURE — G0463 HOSPITAL OUTPT CLINIC VISIT: HCPCS

## 2023-02-27 PROCEDURE — 99213 OFFICE O/P EST LOW 20 MIN: CPT | Performed by: FAMILY MEDICINE

## 2023-02-27 ASSESSMENT — PAIN SCALES - GENERAL: PAINLEVEL: MILD PAIN (3)

## 2023-02-27 NOTE — NURSING NOTE
"Chief Complaint   Patient presents with     Ankle Pain       Initial /72   Pulse 68   Temp 98.7  F (37.1  C)   Resp 18   Wt 84.4 kg (186 lb)   LMP  (LMP Unknown)   SpO2 97%   BMI 32.43 kg/m   Estimated body mass index is 32.43 kg/m  as calculated from the following:    Height as of 1/16/23: 1.613 m (5' 3.5\").    Weight as of this encounter: 84.4 kg (186 lb).  Medication Reconciliation: complete    FOOD SECURITY SCREENING QUESTIONS  Hunger Vital Signs:  Within the past 12 months we worried whether our food would run out before we got money to buy more. Never  Within the past 12 months the food we bought just didn't last and we didn't have money to get more. Never  Yoli Brewer LPN 2/27/2023 10:41 AM           Yoli Brewer LPN  "

## 2023-02-27 NOTE — PROGRESS NOTES
"Nursing Notes:   Yoli Brewer LPN  2/27/2023 10:43 AM  Signed  Chief Complaint   Patient presents with     Ankle Pain       Initial /72   Pulse 68   Temp 98.7  F (37.1  C)   Resp 18   Wt 84.4 kg (186 lb)   LMP  (LMP Unknown)   SpO2 97%   BMI 32.43 kg/m   Estimated body mass index is 32.43 kg/m  as calculated from the following:    Height as of 1/16/23: 1.613 m (5' 3.5\").    Weight as of this encounter: 84.4 kg (186 lb).  Medication Reconciliation: complete    FOOD SECURITY SCREENING QUESTIONS  Hunger Vital Signs:  Within the past 12 months we worried whether our food would run out before we got money to buy more. Never  Within the past 12 months the food we bought just didn't last and we didn't have money to get more. Never  Yoli Brewer LPN 2/27/2023 10:41 AM           Yoli Brewer LPN        Subjective   Hailey is a 77 year old, presenting for the following health issues:  Ankle Pain    Hailey is here today for a complaint of ongoing left ankle pain.  She had been here to see me regarding this on 1/16/23 and had an x-ray at that time.  This showed no acute bony injury, but old bony fragments suggesting prior injury to the deltoid ligament.  Mild degenerative changes in her midfoot and hindfoot were noted as well as a moderately sized calcaneal spur.  Since I saw her last, she has had more pain in her ankle.  2 nights ago, she woke with pain that was bad enough that she could not get back to sleep.  Yesterday, she also had heel pain in her heel that felt like she stepped on a rock, but usually she has pain either on the medial or lateral side of her ankle.  No recent injuries.    Ankle Pain    History of Present Illness       Reason for visit:  Sore ankle    She eats 0-1 servings of fruits and vegetables daily.She consumes 0 sweetened beverage(s) daily.She exercises with enough effort to increase her heart rate 9 or less minutes per day.  She exercises with enough effort to increase her " heart rate 3 or less days per week.   She is taking medications regularly.       Concern - left ankle   Onset: months   Description: left ankle pain, burning, stabbing at night   Intensity: moderate  Progression of Symptoms:  same  Accompanying Signs & Symptoms: none   Previous history of similar problem:   Precipitating factors:        Worsened by: at night   Alleviating factors:        Improved by: nothing   Therapies tried and outcome: None        Review of Systems   Constitutional, HEENT, cardiovascular, pulmonary, GI, , musculoskeletal, neuro, skin, endocrine and psych systems are negative, except as otherwise noted.      Objective    LMP  (LMP Unknown)   There is no height or weight on file to calculate BMI.  Physical Exam  Constitutional:       Appearance: Normal appearance.   Musculoskeletal:      Comments: Left ankle/foot:  She is tender over her achilles and also over the plantar surface of her calcaneous, particularly with passive dorsiflexion of her ankle.  No tenderness of the lateral or medial malleoli or over other bony prominences of her foot, but does have some soft tissue tenderness surrounding both her medial and lateral malleoli.  No pain through her midfoot.   Neurological:      Mental Status: She is alert.          Assessment & Plan   Hailey Shah is a 77 year old, presenting for the following health issues:      ICD-10-CM    1. Pain in joint involving ankle and foot, left  M25.572 MR Ankle Left w/o Contrast     Orthopedic  Referral        1.  She had prior x-ray which showed degenerative changes, but no acute findings.  Suspect she has arthritis, but also may have achilles tendonitis or plantar fasciitis based on today's exam.  Will obtain MRI of her ankle and also referred to Orthopedics.      Roxana Maki MD  [facility] AND [facility]

## 2023-03-13 ENCOUNTER — HOSPITAL ENCOUNTER (OUTPATIENT)
Dept: MRI IMAGING | Facility: OTHER | Age: 78
Discharge: HOME | End: 2023-03-13
Attending: FAMILY MEDICINE | Admitting: FAMILY MEDICINE
Payer: COMMERCIAL

## 2023-03-13 DIAGNOSIS — M25.572 PAIN IN JOINT INVOLVING ANKLE AND FOOT, LEFT: ICD-10-CM

## 2023-03-13 DIAGNOSIS — M67.88 ACHILLES TENDONOSIS OF LEFT LOWER EXTREMITY: Primary | ICD-10-CM

## 2023-03-13 PROCEDURE — 73721 MRI JNT OF LWR EXTRE W/O DYE: CPT | Mod: LT

## 2023-03-14 ENCOUNTER — TELEPHONE (OUTPATIENT)
Dept: FAMILY MEDICINE | Facility: OTHER | Age: 78
End: 2023-03-14
Payer: COMMERCIAL

## 2023-03-14 NOTE — TELEPHONE ENCOUNTER
Patient had an MRI done yesterday and is confused as to what she should be doing next.  She would like a call      Deanna Orozco on 3/14/2023 at 7:16 AM

## 2023-03-14 NOTE — TELEPHONE ENCOUNTER
Notified patient of MRI results with referral to PT and podiatry.   Oumou Medellin LPN on 3/14/2023 at 9:18 AM

## 2023-03-23 ENCOUNTER — OFFICE VISIT (OUTPATIENT)
Dept: ORTHOPEDICS | Facility: OTHER | Age: 78
End: 2023-03-23
Attending: PODIATRIST
Payer: COMMERCIAL

## 2023-03-23 VITALS
HEART RATE: 61 BPM | SYSTOLIC BLOOD PRESSURE: 130 MMHG | OXYGEN SATURATION: 97 % | DIASTOLIC BLOOD PRESSURE: 66 MMHG | BODY MASS INDEX: 32.61 KG/M2 | WEIGHT: 187 LBS

## 2023-03-23 DIAGNOSIS — M25.572 PAIN IN JOINT INVOLVING ANKLE AND FOOT, LEFT: ICD-10-CM

## 2023-03-23 PROCEDURE — 99203 OFFICE O/P NEW LOW 30 MIN: CPT | Performed by: PODIATRIST

## 2023-03-23 PROCEDURE — G0463 HOSPITAL OUTPT CLINIC VISIT: HCPCS

## 2023-03-23 ASSESSMENT — PAIN SCALES - GENERAL: PAINLEVEL: MILD PAIN (2)

## 2023-03-23 NOTE — PROGRESS NOTES
Visit Date: 03/23/2023    HISTORY OF PRESENT ILLNESS:  Hailey is a 77-year-old new patient to see me regarding Achilles tendon pain and her posterior ankle pain.  It has been going on for 2 years.  She fell and broke some ribs a couple years ago.  She has kind of dealt with the ankle pain since that time.  Has therapy ordered for this per primary.  MRI does show some intrasubstance tearing and thickening of the Achilles tendon, which is her area of pain.  Here to have this evaluated and discuss options.  Has not started therapy yet.    HISTORY REVIEW:  I have reviewed this patient's past medical history, family history, social history as well as medications and allergies.  Any changes/additions were appropriately charted in the patient's electronic medical record.      PHYSICAL EXAMINATION:   CONSTITUTIONAL:  The patient is alert and oriented x3, well appearing and in no apparent distress.  Affect is pleasant and answers questions appropriately.  VASCULAR:  Circulation is intact with palpable pedal pulses and adequate capillary fill time to all digits.  Hair growth is present and appropriate to mid foot and digits.  NEUROLOGIC:  Light touch sensation is intact to digits.  There is a negative Tinel sign.  There are no signs of apparent nerve entrapment of superficial peroneal or common peroneal nerves.  INTEGUMENT:  No abnormal dermatologic lesions are noted.  Skin has normal texture and turgor.    MUSCULOSKELETAL:  Mild equinus appreciated, very palpable bulbous tendinosis midsubstance of Achilles very tender to palpate.  Mild equinus is evident.  Ambulatory in normal shoe gear today with a fairly normal gait.    IMAGING:  I did review x-rays and MRI.  MRI does show an intrasubstance tearing of the Achilles and thickening of the Achilles midsubstance.    PLAN:  I discussed condition and treatment with the patient today.  I would like her to go through a course of therapy.  This is quite large with intrasubstance  tearing, very well may require debridement and repair, but she may improve with some therapy.  She has a trip in the middle of May to see her brother and sister in [place] and [place].  She would like to get through this.  Hopefully, we can get things to calm down with therapy at least.  Beyond that with ongoing symptoms, we will need to consider surgical options.  Reappoint with ongoing pain.    Arutro Casas DPM        D: 2023   T: 2023   MT: ERNESTO    Name:     RILEY SANDERSMoira  MRN:      [id number]        Account:    [id number]   :      1945           Visit Date: 2023     Document: [id number]

## 2023-03-23 NOTE — PROGRESS NOTES
Patient is here for consult on her left ankle.;  Brunilda Abreu LPN .....................3/23/2023 8:13 AM

## 2023-04-27 ENCOUNTER — OFFICE VISIT (OUTPATIENT)
Dept: ORTHOPEDICS | Facility: OTHER | Age: 78
End: 2023-04-27
Attending: PODIATRIST
Payer: COMMERCIAL

## 2023-04-27 DIAGNOSIS — M25.572 PAIN IN JOINT INVOLVING ANKLE AND FOOT, LEFT: Primary | ICD-10-CM

## 2023-04-27 PROCEDURE — 99213 OFFICE O/P EST LOW 20 MIN: CPT | Performed by: PODIATRIST

## 2023-04-27 PROCEDURE — G0463 HOSPITAL OUTPT CLINIC VISIT: HCPCS

## 2023-04-27 NOTE — PROGRESS NOTES
SUBJECTIVE:  Hailey is here for recheck of Achilles tendon.  I saw her 5 weeks ago recommended a course of therapy she did not do this is when she called the stated they were 4 and half weeks out so she chose not to do it.  Her symptoms have improved although she  here for recheck.  She is not interested in surgical intervention at this point.    ROS: Musculoskeletal and general review of systems are negative, per review of previous clinic questionnaire.  Denies SOB and calf pain.    EXAM:   PHYSICAL EXAMINATION:   CONSTITUTIONAL:  The patient is alert and oriented x 3, well appearing and in no apparent distress.  Affect is pleasant and answers questions appropriately.  VASCULAR:  Circulation is intact with palpable pedal pulses and adequate capillary fill time to all digits.  Hair growth is present and appropriate to mid foot and digits. Calf nontender.  NEUROLOGIC:  Light touch sensation is intact to digits.  There is a negative Tinel sign.    INTEGUMENT:  No abnormal dermatologic lesions are noted.  Skin has normal texture and turgor.    MUSCULOSKELETAL:   Again mild equinus is appreciated very palpable bulbous Achilles tendon which is  to palpate symptoms improved from last exam.    IMAGING: No new imaging today previous MRI reviewed.    ASSESSMENT: Achilles tendinosis midsubstance.    PLAN OF CARE: Again patient chose not to do PT.  I still think she would benefit from a course of PT which we discussed today.  She states she is going to let it run its course here near-term if she would like to get into therapy she will call to do so.  Discussed stretching exercises which she can do at home and we discussed the last time as well.  Again PT was recommended but she is choosing to stay away from this currently.  20-minute consult.    Arturo Casas DPM

## 2023-04-27 NOTE — PROGRESS NOTES
Patient is here for follow up on her left ankle pain.    Radha Atkins LPN .......  4/27/2023  8:58 AM

## 2023-06-10 DIAGNOSIS — E03.9 HYPOTHYROIDISM, UNSPECIFIED TYPE: ICD-10-CM

## 2023-06-14 NOTE — TELEPHONE ENCOUNTER
Patient  due for follow up labs-per note below. Contacted patient who agreed to be transferred to scheduling to set up a lab only appointment. Will route to PCP       08/09/2022 lab note-Your TSH was slightly elevated at 4.96.  This means that you are very mildly hypothyroid.  I would recommend starting a low dose of Levothyroxine 25 mcg daily.  I have sent a prescription for this to your pharmacy.  You should have your TSH rechecked in 4-6 weeks to ensure that this dose is ok.  I have put an order for this in your chart.  You may call [phone] to make a lab appointment.     Filled 08/09/22 #30 x 11. Due 08/09/23   Disp Refills Start End DELORIS   levothyroxine (SYNTHROID/LEVOTHROID) 25 MCG tablet 30 tablet 11 8/9/2022  --   Sig - Route: Take 1 tablet (25 mcg) by mouth daily - Oral   Sent to pharmacy as: Levothyroxine Sodium 25 MCG Oral Tablet (SYNTHROID/LEVOTHROID)   Class: E-Prescribe     Last Office Visit: 02/27/2023  Future Office visit:    Next 5 appointments (look out 90 days)    Jul 06, 2023  8:00 AM  Pre-Op physical with Raegan Gomez PA-C  [facility] ([facility] ) [address]  [place] [id number] [phone]          Requested Prescriptions   Pending Prescriptions Disp Refills     levothyroxine (SYNTHROID/LEVOTHROID) 25 MCG tablet [Pharmacy Med Name: LEVOTHYROXINE 25MCG TABLET] 210 tablet 0     Sig: TAKE 1 TABLET (25MCG) BY MOUTH EVERY DAY       Thyroid Protocol Failed - 6/10/2023  1:00 AM        Failed - Normal TSH on file in past 12 months     Recent Labs   Lab Test 08/09/22  0655   TSH 4.96*              Passed - Patient is 12 years or older        Passed - Recent (12 mo) or future (30 days) visit within the authorizing provider's specialty     Patient has had an office visit with the authorizing provider or a provider within the authorizing providers department within the previous 12 mos or has a future within next 30  "days. See \"Patient Info\" tab in inbasket, or \"Choose Columns\" in Meds & Orders section of the refill encounter.              Passed - Medication is active on med list        Passed - No active pregnancy on record     If patient is pregnant or has had a positive pregnancy test, please check TSH.          Passed - No positive pregnancy test in past 12 months     If patient is pregnant or has had a positive pregnancy test, please check TSH.               Routing refill request to provider for review/approval.     Unable to complete prescription refill per RNMedication Refill Policy.................... Meghna Mendez RN ....................  6/14/2023   2:15 PM          "

## 2023-06-15 RX ORDER — LEVOTHYROXINE SODIUM 25 UG/1
TABLET ORAL
Qty: 210 TABLET | Refills: 4 | Status: SHIPPED | OUTPATIENT
Start: 2023-06-15 | End: 2024-06-06

## 2023-06-16 ENCOUNTER — LAB (OUTPATIENT)
Dept: LAB | Facility: OTHER | Age: 78
End: 2023-06-16
Attending: FAMILY MEDICINE
Payer: COMMERCIAL

## 2023-06-16 DIAGNOSIS — E03.9 HYPOTHYROIDISM, UNSPECIFIED TYPE: ICD-10-CM

## 2023-06-16 LAB — TSH SERPL DL<=0.005 MIU/L-ACNC: 3.63 UIU/ML (ref 0.3–4.2)

## 2023-06-16 PROCEDURE — 36415 COLL VENOUS BLD VENIPUNCTURE: CPT | Mod: ZL

## 2023-06-16 PROCEDURE — 84443 ASSAY THYROID STIM HORMONE: CPT | Mod: ZL

## 2023-07-06 ENCOUNTER — OFFICE VISIT (OUTPATIENT)
Dept: FAMILY MEDICINE | Facility: OTHER | Age: 78
End: 2023-07-06
Attending: PHYSICIAN ASSISTANT
Payer: COMMERCIAL

## 2023-07-06 VITALS
HEART RATE: 54 BPM | OXYGEN SATURATION: 95 % | BODY MASS INDEX: 31.58 KG/M2 | HEIGHT: 64 IN | SYSTOLIC BLOOD PRESSURE: 122 MMHG | DIASTOLIC BLOOD PRESSURE: 72 MMHG | TEMPERATURE: 97.2 F | WEIGHT: 185 LBS | RESPIRATION RATE: 18 BRPM

## 2023-07-06 DIAGNOSIS — I10 ESSENTIAL HYPERTENSION: ICD-10-CM

## 2023-07-06 DIAGNOSIS — I50.22 CHRONIC SYSTOLIC CONGESTIVE HEART FAILURE (H): ICD-10-CM

## 2023-07-06 DIAGNOSIS — Z01.818 PREOP GENERAL PHYSICAL EXAM: Primary | ICD-10-CM

## 2023-07-06 DIAGNOSIS — H26.9 CATARACT OF BOTH EYES, UNSPECIFIED CATARACT TYPE: ICD-10-CM

## 2023-07-06 DIAGNOSIS — E78.5 HYPERLIPIDEMIA, UNSPECIFIED HYPERLIPIDEMIA TYPE: ICD-10-CM

## 2023-07-06 PROBLEM — R91.1 LUNG NODULE: Status: ACTIVE | Noted: 2021-03-02

## 2023-07-06 PROBLEM — N30.01 ACUTE CYSTITIS WITH HEMATURIA: Status: RESOLVED | Noted: 2020-11-17 | Resolved: 2023-07-06

## 2023-07-06 LAB
ANION GAP SERPL CALCULATED.3IONS-SCNC: 8 MMOL/L (ref 7–15)
BUN SERPL-MCNC: 20.8 MG/DL (ref 8–23)
CALCIUM SERPL-MCNC: 9.4 MG/DL (ref 8.8–10.2)
CHLORIDE SERPL-SCNC: 102 MMOL/L (ref 98–107)
CREAT SERPL-MCNC: 1.69 MG/DL (ref 0.51–0.95)
DEPRECATED HCO3 PLAS-SCNC: 29 MMOL/L (ref 22–29)
GFR SERPL CREATININE-BSD FRML MDRD: 31 ML/MIN/1.73M2
GLUCOSE SERPL-MCNC: 97 MG/DL (ref 70–99)
HGB BLD-MCNC: 13.6 G/DL (ref 11.7–15.7)
HOLD SPECIMEN: NORMAL
POTASSIUM SERPL-SCNC: 4.1 MMOL/L (ref 3.4–5.3)
SODIUM SERPL-SCNC: 139 MMOL/L (ref 136–145)

## 2023-07-06 PROCEDURE — G0463 HOSPITAL OUTPT CLINIC VISIT: HCPCS

## 2023-07-06 PROCEDURE — 99214 OFFICE O/P EST MOD 30 MIN: CPT | Performed by: PHYSICIAN ASSISTANT

## 2023-07-06 PROCEDURE — 80048 BASIC METABOLIC PNL TOTAL CA: CPT | Mod: ZL | Performed by: PHYSICIAN ASSISTANT

## 2023-07-06 PROCEDURE — 85018 HEMOGLOBIN: CPT | Mod: ZL | Performed by: PHYSICIAN ASSISTANT

## 2023-07-06 PROCEDURE — 36415 COLL VENOUS BLD VENIPUNCTURE: CPT | Mod: ZL | Performed by: PHYSICIAN ASSISTANT

## 2023-07-06 RX ORDER — MOXIFLOXACIN 5 MG/ML
SOLUTION/ DROPS OPHTHALMIC
COMMUNITY
Start: 2023-06-14 | End: 2023-10-31

## 2023-07-06 RX ORDER — DIFLUPREDNATE OPHTHALMIC 0.5 MG/ML
EMULSION OPHTHALMIC
COMMUNITY
Start: 2023-06-14 | End: 2023-10-31

## 2023-07-06 ASSESSMENT — PAIN SCALES - GENERAL: PAINLEVEL: NO PAIN (0)

## 2023-07-06 NOTE — PROGRESS NOTES
[facility] AND \[facility]\""  [address]  [facility] [address]  Phone: [phone]  Fax: [phone]  Primary Provider: Roxana Maki  Pre-op Performing Provider: MIKE SUAREZ      PREOPERATIVE EVALUATION:  Today's date: 7/6/2023    Hailey Shah is a 77 year old female who presents for a preoperative evaluation.      7/6/2023     7:59 AM   Additional Questions   Roomed by luis alberto and zelda   Accompanied by self     Surgical Information:  Surgery/Procedure: Right eye cataract  Surgery Location: [facility]  Surgeon: Dr. Reyna  Surgery Date: 7/12/23  Time of Surgery: TBD  Where patient plans to recover: At home with family  Fax number for surgical facility: [phone]    Surgical Information:  Surgery/Procedure: Left eye cataract  Surgery Location: [facility]  Surgeon: Dr. Reyna  Surgery Date: 7/26/23  Time of Surgery: TBD  Where patient plans to recover: At home with family  Fax number for surgical facility: [phone]     Assessment & Plan     The proposed surgical procedure is considered LOW risk.    Problem List Items Addressed This Visit        Endocrine    Hyperlipidemia       Circulatory    Essential hypertension    Chronic systolic congestive heart failure (H)   Other Visit Diagnoses     Preop general physical exam    -  Primary    Relevant Orders    Hemoglobin (Completed)    Basic Metabolic Panel (Completed)    Cataract of both eyes, unspecified cataract type            Patient had hemoglobin and BMP completed today which are stable.  No acute concerns at this time.  Cholesterol and blood pressure have been stable.      Patient has history of chronic systolic congestive heart failure which is currently monitored by cardiology.  Last cardiology consult with echocardiogram and EKG were stable August 2022.  Unchanged as compared to previous.    No acute concerns at this time prior to surgery.  Patient is approved  for surgery.    Risks and Recommendations:  The patient has the following additional risks and recommendations for perioperative complications:   - No identified additional risk factors other than previously addressed    Antiplatelet or Anticoagulation Medication Instructions:   - Bleeding risk is low for this procedure (e.g. dental, skin, cataract).    Additional Medication Instructions:  Patient Instructions     Patient should take the following medications the morning of surgery with a small sip of water: amiodarone, metoprolol, spironolactone.  Patient was instructed to hold the following medications the morning of surgery: hold all other meds.     Patient was advised to call our office and the surgical services with any change in condition or new symptoms if they were to develop between today and their surgical date.  Especially any cardiopulmonary symptoms or symptoms concerning for an infection.     Discontinue fish oil, aspirin, aleve, naproxen and ibuprofen 7 days prior to surgery to reduce bleeding risk.  It is fine to take tylenol the week before surgery.  Hold vitamins and herbal remedies for 7 days before surgery.       For informational purposes only. Not to replace the advice of your health care provider. Copyright   2003, 2019 [employer] [facility]. All rights reserved. Clinically reviewed by Sophie Tafoya MD. Zonder [id number] - REV 12/22.  Preparing for Your Surgery  Getting started  A nurse will call you to review your health history and instructions. They will give you an arrival time based on your scheduled surgery time. Please be ready to share:    Your doctor's clinic name and phone number    Your medical, surgical, and anesthesia history    A list of allergies and sensitivities    A list of medicines, including herbal treatments and over-the-counter drugs    Whether the patient has a legal guardian (ask how to send us the papers in advance)  Please tell us if you're pregnant--or if there's  any chance you might be pregnant. Some surgeries may injure a fetus (unborn baby), so they require a pregnancy test. Surgeries that are safe for a fetus don't always need a test, and you can choose whether to have one.   If you have a child who's having surgery, please ask for a copy of Preparing for Your Child's Surgery.    Preparing for surgery    Within 10 to 30 days of surgery: Have a pre-op exam (sometimes called an H&P, or History and Physical). This can be done at a clinic or pre-operative center.  ? If you're having a , you may not need this exam. Talk to your care team.    At your pre-op exam, talk to your care team about all medicines you take. If you need to stop any medicines before surgery, ask when to start taking them again.  ? We do this for your safety. Many medicines can make you bleed too much during surgery. Some change how well surgery (anesthesia) drugs work.    Call your insurance company to let them know you're having surgery. (If you don't have insurance, call [phone].)    Call your clinic if there's any change in your health. This includes signs of a cold or flu (sore throat, runny nose, cough, rash, fever). It also includes a scrape or scratch near the surgery site.    If you have questions on the day of surgery, call your hospital or surgery center.  Eating and drinking guidelines  For your safety: Unless your surgeon tells you otherwise, follow the guidelines below.    Eat and drink as usual until 8 hours before you arrive for surgery. After that, no food or milk.    Drink clear liquids until 2 hours before you arrive. These are liquids you can see through, like water, Gatorade, and Propel Water. They also include plain black coffee and tea (no cream or milk), candy, and breath mints. You can spit out gum when you arrive.    If you drink alcohol: Stop drinking it the night before surgery.    If your care team tells you to take medicine on the morning of surgery, it's okay  to take it with a sip of water.  Preventing infection    Shower or bathe the night before and morning of your surgery. Follow the instructions your clinic gave you. (If no instructions, use regular soap.)    Don't shave or clip hair near your surgery site. We'll remove the hair if needed.    Don't smoke or vape the morning of surgery. You may chew nicotine gum up to 2 hours before surgery. A nicotine patch is okay.  ? Note: Some surgeries require you to completely quit smoking and nicotine. Check with your surgeon.    Your care team will make every effort to keep you safe from infection. We will:  ? Clean our hands often with soap and water (or an alcohol-based hand rub).  ? Clean the skin at your surgery site with a special soap that kills germs.  ? Give you a special gown to keep you warm. (Cold raises the risk of infection.)  ? Wear special hair covers, masks, gowns and gloves during surgery.  ? Give antibiotic medicine, if prescribed. Not all surgeries need antibiotics.  What to bring on the day of surgery    Photo ID and insurance card    Copy of your health care directive, if you have one    Glasses and hearing aids (bring cases)  ? You can't wear contacts during surgery    Inhaler and eye drops, if you use them (tell us about these when you arrive)    CPAP machine or breathing device, if you use them    A few personal items, if spending the night    If you have . . .  ? A pacemaker, ICD (cardiac defibrillator) or other implant: Bring the ID card.  ? An implanted stimulator: Bring the remote control.  ? A legal guardian: Bring a copy of the certified (court-stamped) guardianship papers.  Please remove any jewelry, including body piercings. Leave jewelry and other valuables at home.  If you're going home the day of surgery    You must have a responsible adult drive you home. They should stay with you overnight as well.    If you don't have someone to stay with you, and you aren't safe to go home alone, we may  keep you overnight. Insurance often won't pay for this.  After surgery  If it's hard to control your pain or you need more pain medicine, please call your surgeon's office.  Questions?   If you have any questions for your care team, list them here: _________________________________________________________________________________________________________________________________________________________________________ ____________________________________ ____________________________________ ____________________________________    How to Take Your Medication Before Surgery  - Take all of your medications before surgery except as noted below  - HOLD (do not take) Aspirin for 7 days  - STOP taking all vitamins and herbal supplements 14 days before surgery.       RECOMMENDATION:  APPROVAL GIVEN to proceed with proposed procedure, without further diagnostic evaluation.    Ordering of each unique test  30 minutes spent by me on the date of the encounter doing chart review, history and exam, documentation and further activities per the note      Subjective       HPI related to upcoming procedure: bilateral cataracts.  Progressing for a few years.  Right eye worse.          7/6/2023     7:54 AM   Preop Questions   1. Have you ever had a heart attack or stroke? No   2. Have you ever had surgery on your heart or blood vessels, such as a stent placement, a coronary artery bypass, or surgery on an artery in your head, neck, heart, or legs? No   3. Do you have chest pain with activity? No   4. Do you have a history of  heart failure? YES - mild heart failure. See Dr. Granados in [place].  Last saw in August.    5. Do you currently have a cold, bronchitis or symptoms of other infection? No   6. Do you have a cough, shortness of breath, or wheezing? No   7. Do you or anyone in your family have previous history of blood clots? No   8. Do you or does anyone in your family have a serious bleeding problem such as prolonged bleeding following  surgeries or cuts? No   9. Have you ever had problems with anemia or been told to take iron pills? No   10. Have you had any abnormal blood loss such as black, tarry or bloody stools, or abnormal vaginal bleeding? No   11. Have you ever had a blood transfusion? No   12. Are you willing to have a blood transfusion if it is medically needed before, during, or after your surgery? Yes   13. Have you or any of your relatives ever had problems with anesthesia? No   14. Do you have sleep apnea, excessive snoring or daytime drowsiness? No   15. Do you have any artifical heart valves or other implanted medical devices like a pacemaker, defibrillator, or continuous glucose monitor? No   16. Do you have artificial joints? No   17. Are you allergic to latex? No       Health Care Directive:  Patient does not have a Health Care Directive or Living Will: Patient states has Advance Directive and will bring in a copy to clinic.    Preoperative Review of :   reviewed - no record of controlled substances prescribed.      Status of Chronic Conditions:  CHF - Patient has a longstanding history of moderate-severe CHF. Exacerbating conditions include hypertension. Currently the patient's condition is same. Current treatment regimen includes beta blocker, ASA, diuretic and spirolactone. The patient denies chest pain, edema, orthopnea, SOB or recent weight gain. Last Echocardiogram 8/3/2022, EKG 8/3/2022. Monitors with cardiology annually.    HYPERLIPIDEMIA - Patient has a long history of significant Hyperlipidemia requiring medication for treatment with recent good control. Patient reports no problems or side effects with the medication.     HYPERTENSION - Patient has longstanding history of HTN , currently denies any symptoms referable to elevated blood pressure. Specifically denies chest pain, palpitations, dyspnea, orthopnea, PND or peripheral edema. Blood pressure readings have been in normal range. Current medication regimen is  as listed below. Patient denies any side effects of medication.       Patient has tolerated surgery well in the past.  Patient has no recent cough or cold symptoms.  No recent fevers, chills, sore throat, ear pain, chest pain, palpitations, problems breathing, stomachaches, nausea, vomiting, diarrhea, constipation, blood in stool or urine, dysuria, frequency, urgency.    Patient can walk up a flight of stairs without becoming short of breath or having chest pain: YES   Patient is able to tolerate greater than 4 METs of activity without any cardiopulmonary symptoms.    Review of Systems  CONSTITUTIONAL: NEGATIVE for fever, chills, change in weight  INTEGUMENTARY/SKIN: NEGATIVE for worrisome rashes, moles or lesions  EYES: NEGATIVE for vision changes or irritation  ENT/MOUTH: NEGATIVE for ear, mouth and throat problems  RESP: NEGATIVE for significant cough or SOB  CV: NEGATIVE for chest pain, palpitations or peripheral edema  GI: NEGATIVE for nausea, abdominal pain, heartburn, or change in bowel habits  : NEGATIVE for frequency, dysuria, or hematuria  MUSCULOSKELETAL: NEGATIVE for significant arthralgias or myalgia  NEURO: NEGATIVE for weakness, dizziness or paresthesias  ENDOCRINE: NEGATIVE for temperature intolerance, skin/hair changes  HEME: NEGATIVE for bleeding problems  PSYCHIATRIC: NEGATIVE for changes in mood or affect    Patient Active Problem List    Diagnosis Date Noted     Abdominal aortic aneurysm (AAA) without rupture (H) 08/30/2022     Priority: Medium     Chronic systolic congestive heart failure (H) 03/02/2021     Priority: Medium     Lung nodule 03/02/2021     Priority: Medium     Hypokalemia 11/17/2020     Priority: Medium     Vasovagal syncope 11/17/2020     Priority: Medium     Elevated troponin 11/17/2020     Priority: Medium     Community acquired pneumonia of right lower lobe of lung 11/17/2020     Priority: Medium     Family history of ischemic heart disease 01/16/2018     Priority: Medium      Overview:   Brother had fatal myocardial infarction at age 60.       Lumbago 01/16/2018     Priority: Medium     Overview:   Chronic low back pain secondary to injury, 1995, with chronic pain syndrome.       Hyperlipidemia 08/28/2017     Priority: Medium     Overview:   IMO Update 10/11       Essential hypertension 08/28/2017     Priority: Medium     Overview:   IMO Update       PVC's (premature ventricular contractions) 12/14/2016     Priority: Medium     At risk for amiodarone toxicity with long term use 06/09/2016     Priority: Medium     Arthralgia of right hip 04/26/2016     Priority: Medium     Idiopathic cardiomyopathy (H) 09/24/2014     Priority: Medium     CKD (chronic kidney disease) stage 3, GFR 30-59 ml/min (H) 02/24/2014     Priority: Medium     Raynaud's disease 01/25/2012     Priority: Medium     Arthropathy of shoulder region 05/28/2010     Priority: Medium     Overview:   IMO Update 10/11       RCT (rotator cuff tear) 05/28/2010     Priority: Medium     Overview:   IMO Update 10/11       Obesity 11/12/2008     Priority: Medium     Overview:   Updated per 10/1/17 IMO import       Raynaud's phenomenon (by history or observed) 11/12/2008     Priority: Medium     Overview:   IMO Update 10/11        Past Medical History:   Diagnosis Date     Chronic diastolic heart failure (H)     2/24/2014,ECHO 10/30/2013 - Final Impressions:  1. Mild left ventricular enlargement with a mild decrease in systolic function, estimated ejection fraction 45%.  2. Mild right ventricular enlargement with normal systolic function.  3. Mild left atrial enlargement.  4. Mild mitral regurgitation.  5. Trace tricuspid regurgitation.  6. Mild left ventricular diastolic dysfunction.  7. When compared t*     Chronic kidney disease, stage III (moderate) (H)     2/24/2014     Essential (primary) hypertension     No Comments Provided     Hyperlipidemia     No Comments Provided     Low back pain     Chronic low back pain secondary to  injury, , with chronic pain syndrome.     Other cardiomyopathies (CODE)     2013,Diagnosed by angio and cath study 2013 at [facility]. EF of 35% at first study     Personal history of other medical treatment (CODE)      with all vaginal deliveries     Raynaud's syndrome without gangrene     No Comments Provided     Tinnitus     No Comments Provided     Ventricular premature depolarization     3/25/2013     Ventricular tachycardia (H)     3/4/2014     Past Surgical History:   Procedure Laterality Date     ARTHROSCOPY SHOULDER Right     Shoulder surgery for impingement     ARTHROSCOPY SHOULDER Left      Left shoulder surgery for impingement     ARTHROSCOPY SHOULDER  2009    and debridement, distal clavical excision     Cardiac ablation for pvc      not successful     COLONOSCOPY      06,Inflammation noted, no further issues     COLONOSCOPY  2016,wnl no need f/u     DILATION AND CURETTAGE      D&C for retained placentas x2     FINGER SURGERY Left 10/1989    fourth finger, cyst removal     HYSTERECTOMY TOTAL ABDOMINAL, BILATERAL SALPINGO-OOPHORECTOMY, COMBINED      ,Soren-Clifton procedure     MAMMOPLASTY AUGMENTATION           REPAIR BLADDER      1999, with [facility] and Juan Miguel procedure with cystoscopy     Current Outpatient Medications   Medication Sig Dispense Refill     amiodarone (PACERONE/CODARONE) 100 MG TABS tablet Take 150 mg by mouth daily Take 1 and 1/2 tablet by mouth daily 135 tablet 3     ascorbic acid (VITAMIN C) 500 MG tablet Take 500 mg by mouth daily        aspirin EC 81 MG EC tablet Take 81 mg by mouth daily        Calcium Carbonate-Vitamin D 500-125 MG-UNIT TABS Take 1 tablet by mouth 2 times daily        difluprednate (DUREZOL) 0.05 % ophthalmic emulsion INSTILL 1 DROP INTO RIGHT EYE 2 TIMES DAILY. BEGIN AFTER SURGERY AND USE FOR 4 WEEKS.       doxepin (SILENOR) 6 MG tablet Take 1 tablet (6 mg) by mouth nightly as needed for sleep 90 tablet 3      "fish oil-omega-3 fatty acids 1000 MG capsule Take 2 g by mouth 2 times daily       furosemide (LASIX) 40 MG tablet Take 40 mg by mouth daily        ILEVRO 0.3 % ophthalmic suspension INSTILL 1 DROP INTO RIGHT EYE ONCE DAILY. BEGIN 3 DAYS BEFORE SURGERY AND CONTINUE FOR 6 WEEKS AFTER.       levothyroxine (SYNTHROID/LEVOTHROID) 25 MCG tablet TAKE 1 TABLET (25MCG) BY MOUTH EVERY  tablet 4     metoprolol succinate (TOPROL-XL) 50 MG 24 hr tablet TAKE 1 TABLET BY MOUTH EVERY DAY 30 tablet 0     moxifloxacin (VIGAMOX) 0.5 % ophthalmic solution INSTILL 1 DROP INTO RIGHT EYE 4 TIMES DAILY AS DIRECTED. BEGIN 3 DAYS BEFORE SURGERY AND CONTINUE FOR 1 WEEK AFTER.       Multiple Vitamin (MULTI-VITAMINS) TABS Take 1 tablet by mouth       potassium chloride ER (KLOR-CON M) 20 MEQ CR tablet TAKE 1 TABLET (20 MEQ) BY MOUTH 2 TIMES DAILY *NEED APPOINTMENT FOR FUTURE REFILLS* 180 tablet 3     rosuvastatin (CRESTOR) 40 MG tablet TAKE 1 TABLET BY MOUTH NIGHTLY AT BEDTIME *NEED APPOINTMENT FOR FUTURE REFILLS* 90 tablet 3     spironolactone (ALDACTONE) 25 MG tablet TAKE 1/2 TABLET BY MOUTH DAILY 45 tablet 4     UNABLE TO FIND MEDICATION NAME: OSTEO BIFLEX 1 tablet daily         Allergies   Allergen Reactions     Codeine GI Disturbance     \"gets sick\" per patient      Morphine Nausea     \"gets sick\" per patient      Sulfa Antibiotics Unknown     Other reaction(s): *Unknown - Childhood Rxn per her mother        Social History     Tobacco Use     Smoking status: Never     Smokeless tobacco: Never     Tobacco comments:     Quit smoking: 3-4 diet coke daily   Substance Use Topics     Alcohol use: No     Alcohol/week: 0.0 standard drinks of alcohol     Family History   Problem Relation Age of Onset     Heart Disease Mother         Heart Disease,CHF     Hypertension Mother         Hypertension     Other - See Comments Mother         rheumatic fever     Other - See Comments Father         Old age     Breast Cancer Sister      Heart Disease " "Brother         Heart Disease,MI     Blood Disease No family hx of         Blood Disease,no clotting disorders     History   Drug Use Unknown         Objective     /72   Pulse 54   Temp 97.2  F (36.2  C) (Temporal)   Resp 18   Ht 1.613 m (5' 3.5\")   Wt 83.9 kg (185 lb)   LMP  (LMP Unknown)   SpO2 95%   BMI 32.26 kg/m      Physical Exam    GENERAL APPEARANCE: healthy, alert and no distress     EYES: EOMI, PERRL     HENT: ear canals and TM's normal and nose and mouth without ulcers or lesions     NECK: no adenopathy, no asymmetry, masses, or scars and thyroid normal to palpation     RESP: lungs clear to auscultation - no rales, rhonchi or wheezes     CV: regular rates and rhythm, normal S1 S2, no S3 or S4 and no murmur, click or rub     ABDOMEN:  soft, nontender, no HSM or masses and bowel sounds normal     MS: extremities normal- no gross deformities noted, no evidence of inflammation in joints, FROM in all extremities.     SKIN: no suspicious lesions or rashes     NEURO: Normal strength and tone, sensory exam grossly normal, mentation intact and speech normal     PSYCH: mentation appears normal. and affect normal/bright     LYMPHATICS: No cervical adenopathy    Recent Labs   Lab Test 08/09/22  0752 08/09/22  0655   HGB 12.5  --      --    NA  --  139   POTASSIUM  --  3.7   CR  --  1.51*        Diagnostics:  Recent Results (from the past 720 hour(s))   TSH Reflex GH    Collection Time: 06/16/23  9:41 AM   Result Value Ref Range    TSH 3.63 0.30 - 4.20 uIU/mL   Hemoglobin    Collection Time: 07/06/23  8:34 AM   Result Value Ref Range    Hemoglobin 13.6 11.7 - 15.7 g/dL   Basic Metabolic Panel    Collection Time: 07/06/23  8:34 AM   Result Value Ref Range    Sodium 139 136 - 145 mmol/L    Potassium 4.1 3.4 - 5.3 mmol/L    Chloride 102 98 - 107 mmol/L    Carbon Dioxide (CO2) 29 22 - 29 mmol/L    Anion Gap 8 7 - 15 mmol/L    Urea Nitrogen 20.8 8.0 - 23.0 mg/dL    Creatinine 1.69 (H) 0.51 - 0.95 mg/dL "    Calcium 9.4 8.8 - 10.2 mg/dL    Glucose 97 70 - 99 mg/dL    GFR Estimate 31 (L) >60 mL/min/1.73m2      No EKG required for low risk surgery (cataract, skin procedure, breast biopsy, etc).    Revised Cardiac Risk Index (RCRI):  The patient has the following serious cardiovascular risks for perioperative complications:   - Congestive Heart Failure (pulmonary edema, PND, s3 jovani, CXR with pulmonary congestion, basilar rales) = 1 point     RCRI Interpretation: 1 point: Class II (low risk - 0.9% complication rate)           Signed Electronically by: Raegan Gomez PA-C  Copy of this evaluation report is provided to requesting physician.

## 2023-07-06 NOTE — PATIENT INSTRUCTIONS
Patient should take the following medications the morning of surgery with a small sip of water: amiodarone, metoprolol, spironolactone.  Patient was instructed to hold the following medications the morning of surgery: hold all other meds.     Patient was advised to call our office and the surgical services with any change in condition or new symptoms if they were to develop between today and their surgical date.  Especially any cardiopulmonary symptoms or symptoms concerning for an infection.     Discontinue fish oil, aspirin, aleve, naproxen and ibuprofen 7 days prior to surgery to reduce bleeding risk.  It is fine to take tylenol the week before surgery.  Hold vitamins and herbal remedies for 7 days before surgery.       For informational purposes only. Not to replace the advice of your health care provider. Copyright   2003, 2019 [employer]. All rights reserved. Clinically reviewed by Sophie Tafoya MD. [employer] [id number] - REV 12/22.  Preparing for Your Surgery  Getting started  A nurse will call you to review your health history and instructions. They will give you an arrival time based on your scheduled surgery time. Please be ready to share:  Your doctor's clinic name and phone number  Your medical, surgical, and anesthesia history  A list of allergies and sensitivities  A list of medicines, including herbal treatments and over-the-counter drugs  Whether the patient has a legal guardian (ask how to send us the papers in advance)  Please tell us if you're pregnant--or if there's any chance you might be pregnant. Some surgeries may injure a fetus (unborn baby), so they require a pregnancy test. Surgeries that are safe for a fetus don't always need a test, and you can choose whether to have one.   If you have a child who's having surgery, please ask for a copy of Preparing for Your Child's Surgery.    Preparing for surgery  Within 10 to 30 days of surgery: Have a pre-op exam (sometimes called an H&P,  or History and Physical). This can be done at a clinic or pre-operative center.  If you're having a , you may not need this exam. Talk to your care team.  At your pre-op exam, talk to your care team about all medicines you take. If you need to stop any medicines before surgery, ask when to start taking them again.  We do this for your safety. Many medicines can make you bleed too much during surgery. Some change how well surgery (anesthesia) drugs work.  Call your insurance company to let them know you're having surgery. (If you don't have insurance, call [phone].)  Call your clinic if there's any change in your health. This includes signs of a cold or flu (sore throat, runny nose, cough, rash, fever). It also includes a scrape or scratch near the surgery site.  If you have questions on the day of surgery, call your hospital or surgery center.  Eating and drinking guidelines  For your safety: Unless your surgeon tells you otherwise, follow the guidelines below.  Eat and drink as usual until 8 hours before you arrive for surgery. After that, no food or milk.  Drink clear liquids until 2 hours before you arrive. These are liquids you can see through, like water, Gatorade, and Propel Water. They also include plain black coffee and tea (no cream or milk), candy, and breath mints. You can spit out gum when you arrive.  If you drink alcohol: Stop drinking it the night before surgery.  If your care team tells you to take medicine on the morning of surgery, it's okay to take it with a sip of water.  Preventing infection  Shower or bathe the night before and morning of your surgery. Follow the instructions your clinic gave you. (If no instructions, use regular soap.)  Don't shave or clip hair near your surgery site. We'll remove the hair if needed.  Don't smoke or vape the morning of surgery. You may chew nicotine gum up to 2 hours before surgery. A nicotine patch is okay.  Note: Some surgeries require you to  completely quit smoking and nicotine. Check with your surgeon.  Your care team will make every effort to keep you safe from infection. We will:  Clean our hands often with soap and water (or an alcohol-based hand rub).  Clean the skin at your surgery site with a special soap that kills germs.  Give you a special gown to keep you warm. (Cold raises the risk of infection.)  Wear special hair covers, masks, gowns and gloves during surgery.  Give antibiotic medicine, if prescribed. Not all surgeries need antibiotics.  What to bring on the day of surgery  Photo ID and insurance card  Copy of your health care directive, if you have one  Glasses and hearing aids (bring cases)  You can't wear contacts during surgery  Inhaler and eye drops, if you use them (tell us about these when you arrive)  CPAP machine or breathing device, if you use them  A few personal items, if spending the night  If you have . . .  A pacemaker, ICD (cardiac defibrillator) or other implant: Bring the ID card.  An implanted stimulator: Bring the remote control.  A legal guardian: Bring a copy of the certified (court-stamped) guardianship papers.  Please remove any jewelry, including body piercings. Leave jewelry and other valuables at home.  If you're going home the day of surgery  You must have a responsible adult drive you home. They should stay with you overnight as well.  If you don't have someone to stay with you, and you aren't safe to go home alone, we may keep you overnight. Insurance often won't pay for this.  After surgery  If it's hard to control your pain or you need more pain medicine, please call your surgeon's office.  Questions?   If you have any questions for your care team, list them here: _________________________________________________________________________________________________________________________________________________________________________ ____________________________________ ____________________________________  ____________________________________    How to Take Your Medication Before Surgery  - Take all of your medications before surgery except as noted below  - HOLD (do not take) Aspirin for 7 days  - STOP taking all vitamins and herbal supplements 14 days before surgery.

## 2023-07-14 ENCOUNTER — HOSPITAL ENCOUNTER (OUTPATIENT)
Dept: MAMMOGRAPHY | Facility: OTHER | Age: 78
Discharge: HOME | End: 2023-07-14
Attending: FAMILY MEDICINE | Admitting: FAMILY MEDICINE
Payer: COMMERCIAL

## 2023-07-14 DIAGNOSIS — Z12.31 VISIT FOR SCREENING MAMMOGRAM: ICD-10-CM

## 2023-07-14 PROCEDURE — 77067 SCR MAMMO BI INCL CAD: CPT

## 2023-09-08 DIAGNOSIS — E78.2 MIXED HYPERLIPIDEMIA: ICD-10-CM

## 2023-09-08 DIAGNOSIS — E87.6 HYPOKALEMIA: ICD-10-CM

## 2023-09-11 ENCOUNTER — PATIENT OUTREACH (OUTPATIENT)
Dept: FAMILY MEDICINE | Facility: OTHER | Age: 78
End: 2023-09-11
Payer: COMMERCIAL

## 2023-09-11 NOTE — TELEPHONE ENCOUNTER
Patient Quality Outreach    Patient is due for the following:   Physical Annual Wellness Visit    Next Steps:   Schedule a Annual Wellness Visit    Type of outreach:    Sent letter.      Questions for provider review:    None           Magda Owens

## 2023-09-11 NOTE — LETTER
[facility]  [address]  [place] [id number] [phone]       September 11, 2023    aHiley Shah  [address]  [facility] [address]    Dear Hailey,    We care about your health and have reviewed your health plan and are making recommendations based on this review, to optimize your health.    You are in particular need of attention regarding:  -Wellness (Physical) Visit     We are recommending that you:  -schedule a WELLNESS (Physical) APPOINTMENT with me.       In addition, here is a list of due or overdue Health Maintenance reminders.    Health Maintenance Due   Topic Date Due    Heart Failure Action Plan  Never done    Hepatitis C Screening  Never done    Annual Wellness Visit  08/01/2023    Liver Monitoring Lab  08/09/2023    Cholesterol Lab  08/09/2023    Complete Blood Count  08/09/2023    Flu Vaccine (1) 09/01/2023       To address the above recommendations, we encourage you to contact us at [phone].They will assist you with finding the most convenient time and location.    Thank you for trusting [facility] and we appreciate the opportunity to serve you.  We look forward to supporting your healthcare needs in the future.    Healthy Regards,    Your [facility] Team

## 2023-09-12 RX ORDER — POTASSIUM CHLORIDE 1500 MG/1
TABLET, EXTENDED RELEASE ORAL
Qty: 180 TABLET | Refills: 2 | Status: SHIPPED | OUTPATIENT
Start: 2023-09-12 | End: 2024-01-09

## 2023-09-12 RX ORDER — ROSUVASTATIN CALCIUM 40 MG/1
TABLET, COATED ORAL
Qty: 90 TABLET | Refills: 2 | Status: SHIPPED | OUTPATIENT
Start: 2023-09-12 | End: 2024-01-09

## 2023-09-12 NOTE — TELEPHONE ENCOUNTER
Routing refill request to provider for review/approval because:    LOV: 7/6/23    Hansa Grijalva RN on 9/12/2023 at 2:05 PM

## 2023-09-14 ENCOUNTER — LAB (OUTPATIENT)
Dept: LAB | Facility: OTHER | Age: 78
End: 2023-09-14
Payer: COMMERCIAL

## 2023-09-14 ENCOUNTER — ALLIED HEALTH/NURSE VISIT (OUTPATIENT)
Dept: FAMILY MEDICINE | Facility: OTHER | Age: 78
End: 2023-09-14
Payer: COMMERCIAL

## 2023-09-14 DIAGNOSIS — I42.9 IDIOPATHIC CARDIOMYOPATHY (H): ICD-10-CM

## 2023-09-14 DIAGNOSIS — I49.3 PREMATURE VENTRICULAR CONTRACTIONS: Primary | ICD-10-CM

## 2023-09-14 DIAGNOSIS — I49.3 VENTRICULAR PREMATURE BEATS: ICD-10-CM

## 2023-09-14 LAB
AST SERPL W P-5'-P-CCNC: 29 U/L (ref 0–45)
MAGNESIUM SERPL-MCNC: 2.2 MG/DL (ref 1.7–2.3)

## 2023-09-14 PROCEDURE — 84450 TRANSFERASE (AST) (SGOT): CPT | Mod: ZL

## 2023-09-14 PROCEDURE — 36415 COLL VENOUS BLD VENIPUNCTURE: CPT | Mod: ZL

## 2023-09-14 PROCEDURE — 93010 ELECTROCARDIOGRAM REPORT: CPT | Performed by: STUDENT IN AN ORGANIZED HEALTH CARE EDUCATION/TRAINING PROGRAM

## 2023-09-14 PROCEDURE — 93005 ELECTROCARDIOGRAM TRACING: CPT

## 2023-09-14 PROCEDURE — 83735 ASSAY OF MAGNESIUM: CPT | Mod: ZL

## 2023-09-14 NOTE — PROGRESS NOTES
Pt presented to nurse injection room to get an EKG. EKG ordered by Dr. Thiago Granados due to premature ventricular contractions. EKG performed and tracing faxed to ordering provider. EKG transmitted and imported to Muse, then tracing and written order sent for scanning into Epic.     ISIAH FLORES RN on 9/14/2023 at 3:20 PM

## 2023-09-18 LAB
ATRIAL RATE - MUSE: 59 BPM
DIASTOLIC BLOOD PRESSURE - MUSE: NORMAL MMHG
INTERPRETATION ECG - MUSE: NORMAL
P AXIS - MUSE: 35 DEGREES
PR INTERVAL - MUSE: 192 MS
QRS DURATION - MUSE: 122 MS
QT - MUSE: 482 MS
QTC - MUSE: 477 MS
R AXIS - MUSE: -11 DEGREES
SYSTOLIC BLOOD PRESSURE - MUSE: NORMAL MMHG
T AXIS - MUSE: 42 DEGREES
VENTRICULAR RATE- MUSE: 59 BPM

## 2023-09-26 DIAGNOSIS — G47.00 INSOMNIA, UNSPECIFIED TYPE: ICD-10-CM

## 2023-10-03 RX ORDER — DOXEPIN 6 MG/1
TABLET, FILM COATED ORAL
Qty: 90 TABLET | Refills: 3 | Status: SHIPPED | OUTPATIENT
Start: 2023-10-03 | End: 2024-09-04

## 2023-10-03 NOTE — TELEPHONE ENCOUNTER
Requested Prescriptions   Pending Prescriptions Disp Refills    doxepin (SILENOR) 6 MG tablet [Pharmacy Med Name: DOXEPIN 6MG TABLET] 90 tablet 3     Sig: TAKE 1 TABLET (6MG) BY MOUTH EVERY NIGHT AS NEEDED FOR SLEEP       There is no refill protocol information for this order           LOV: 7/6/2023  Future Office visit:    Next 5 appointments (look out 90 days)      Oct 31, 2023  2:00 PM  PHYSICAL with Roxana Maki MD  [facility] ([facility] ) [address]  [place] [address]  [phone]            Routing refill request to provider for review/approval.      Mandy Reyes RN  ....................  10/3/2023   5:45 PM

## 2023-10-07 ENCOUNTER — HEALTH MAINTENANCE LETTER (OUTPATIENT)
Age: 78
End: 2023-10-07

## 2023-10-31 ENCOUNTER — HOSPITAL ENCOUNTER (OUTPATIENT)
Dept: GENERAL RADIOLOGY | Facility: OTHER | Age: 78
Discharge: HOME | End: 2023-10-31
Attending: FAMILY MEDICINE
Payer: COMMERCIAL

## 2023-10-31 ENCOUNTER — OFFICE VISIT (OUTPATIENT)
Dept: FAMILY MEDICINE | Facility: OTHER | Age: 78
End: 2023-10-31
Attending: FAMILY MEDICINE
Payer: COMMERCIAL

## 2023-10-31 VITALS
SYSTOLIC BLOOD PRESSURE: 122 MMHG | HEART RATE: 54 BPM | TEMPERATURE: 97.3 F | WEIGHT: 187 LBS | DIASTOLIC BLOOD PRESSURE: 59 MMHG | HEIGHT: 64 IN | BODY MASS INDEX: 31.92 KG/M2 | RESPIRATION RATE: 18 BRPM | OXYGEN SATURATION: 97 %

## 2023-10-31 DIAGNOSIS — N18.32 STAGE 3B CHRONIC KIDNEY DISEASE (H): ICD-10-CM

## 2023-10-31 DIAGNOSIS — Z71.3 WEIGHT LOSS COUNSELING, ENCOUNTER FOR: ICD-10-CM

## 2023-10-31 DIAGNOSIS — I49.3 PVC'S (PREMATURE VENTRICULAR CONTRACTIONS): ICD-10-CM

## 2023-10-31 DIAGNOSIS — Z29.11 NEED FOR VACCINATION AGAINST RESPIRATORY SYNCYTIAL VIRUS: ICD-10-CM

## 2023-10-31 DIAGNOSIS — I50.22 CHRONIC SYSTOLIC CONGESTIVE HEART FAILURE (H): ICD-10-CM

## 2023-10-31 DIAGNOSIS — I71.40 ABDOMINAL AORTIC ANEURYSM (AAA) WITHOUT RUPTURE, UNSPECIFIED PART (H): ICD-10-CM

## 2023-10-31 DIAGNOSIS — Z13.29 SCREENING FOR THYROID DISORDER: ICD-10-CM

## 2023-10-31 DIAGNOSIS — E78.5 HYPERLIPIDEMIA, UNSPECIFIED HYPERLIPIDEMIA TYPE: ICD-10-CM

## 2023-10-31 DIAGNOSIS — Z11.59 NEED FOR HEPATITIS C SCREENING TEST: ICD-10-CM

## 2023-10-31 DIAGNOSIS — M25.552 HIP PAIN, LEFT: ICD-10-CM

## 2023-10-31 DIAGNOSIS — I10 ESSENTIAL HYPERTENSION: ICD-10-CM

## 2023-10-31 DIAGNOSIS — I42.9 IDIOPATHIC CARDIOMYOPATHY (H): ICD-10-CM

## 2023-10-31 DIAGNOSIS — Z00.00 ENCOUNTER FOR MEDICARE ANNUAL WELLNESS EXAM: Primary | ICD-10-CM

## 2023-10-31 LAB
ALBUMIN SERPL BCG-MCNC: 4.8 G/DL (ref 3.5–5.2)
ALP SERPL-CCNC: 64 U/L (ref 35–104)
ALT SERPL W P-5'-P-CCNC: 28 U/L (ref 0–50)
ANION GAP SERPL CALCULATED.3IONS-SCNC: 12 MMOL/L (ref 7–15)
AST SERPL W P-5'-P-CCNC: 34 U/L (ref 0–45)
BASOPHILS # BLD AUTO: 0 10E3/UL (ref 0–0.2)
BASOPHILS NFR BLD AUTO: 1 %
BILIRUB SERPL-MCNC: 0.6 MG/DL
BUN SERPL-MCNC: 17.1 MG/DL (ref 8–23)
CALCIUM SERPL-MCNC: 10.1 MG/DL (ref 8.8–10.2)
CHLORIDE SERPL-SCNC: 101 MMOL/L (ref 98–107)
CHOLEST SERPL-MCNC: 153 MG/DL
CREAT SERPL-MCNC: 1.56 MG/DL (ref 0.51–0.95)
CREAT UR-MCNC: 49.8 MG/DL
DEPRECATED HCO3 PLAS-SCNC: 28 MMOL/L (ref 22–29)
EGFRCR SERPLBLD CKD-EPI 2021: 34 ML/MIN/1.73M2
EOSINOPHIL # BLD AUTO: 0.1 10E3/UL (ref 0–0.7)
EOSINOPHIL NFR BLD AUTO: 2 %
ERYTHROCYTE [DISTWIDTH] IN BLOOD BY AUTOMATED COUNT: 13.9 % (ref 10–15)
GLUCOSE SERPL-MCNC: 104 MG/DL (ref 70–99)
HCT VFR BLD AUTO: 41.6 % (ref 35–47)
HDLC SERPL-MCNC: 74 MG/DL
HGB BLD-MCNC: 14.1 G/DL (ref 11.7–15.7)
IMM GRANULOCYTES # BLD: 0 10E3/UL
IMM GRANULOCYTES NFR BLD: 0 %
LDLC SERPL CALC-MCNC: 67 MG/DL
LYMPHOCYTES # BLD AUTO: 2.3 10E3/UL (ref 0.8–5.3)
LYMPHOCYTES NFR BLD AUTO: 36 %
MCH RBC QN AUTO: 31.2 PG (ref 26.5–33)
MCHC RBC AUTO-ENTMCNC: 33.9 G/DL (ref 31.5–36.5)
MCV RBC AUTO: 92 FL (ref 78–100)
MICROALBUMIN UR-MCNC: <12 MG/L
MICROALBUMIN/CREAT UR: NORMAL MG/G{CREAT}
MONOCYTES # BLD AUTO: 0.5 10E3/UL (ref 0–1.3)
MONOCYTES NFR BLD AUTO: 8 %
NEUTROPHILS # BLD AUTO: 3.5 10E3/UL (ref 1.6–8.3)
NEUTROPHILS NFR BLD AUTO: 53 %
NONHDLC SERPL-MCNC: 79 MG/DL
NRBC # BLD AUTO: 0 10E3/UL
NRBC BLD AUTO-RTO: 0 /100
PLATELET # BLD AUTO: 225 10E3/UL (ref 150–450)
POTASSIUM SERPL-SCNC: 3.7 MMOL/L (ref 3.4–5.3)
PROT SERPL-MCNC: 8 G/DL (ref 6.4–8.3)
RBC # BLD AUTO: 4.52 10E6/UL (ref 3.8–5.2)
SODIUM SERPL-SCNC: 141 MMOL/L (ref 135–145)
TRIGL SERPL-MCNC: 58 MG/DL
TSH SERPL DL<=0.005 MIU/L-ACNC: 3.18 UIU/ML (ref 0.3–4.2)
WBC # BLD AUTO: 6.5 10E3/UL (ref 4–11)

## 2023-10-31 PROCEDURE — 80053 COMPREHEN METABOLIC PANEL: CPT | Mod: ZL | Performed by: FAMILY MEDICINE

## 2023-10-31 PROCEDURE — 36415 COLL VENOUS BLD VENIPUNCTURE: CPT | Mod: ZL | Performed by: FAMILY MEDICINE

## 2023-10-31 PROCEDURE — 80061 LIPID PANEL: CPT | Mod: ZL | Performed by: FAMILY MEDICINE

## 2023-10-31 PROCEDURE — G0439 PPPS, SUBSEQ VISIT: HCPCS | Performed by: FAMILY MEDICINE

## 2023-10-31 PROCEDURE — 86803 HEPATITIS C AB TEST: CPT | Mod: ZL | Performed by: FAMILY MEDICINE

## 2023-10-31 PROCEDURE — 84443 ASSAY THYROID STIM HORMONE: CPT | Mod: ZL | Performed by: FAMILY MEDICINE

## 2023-10-31 PROCEDURE — 82570 ASSAY OF URINE CREATININE: CPT | Mod: ZL | Performed by: FAMILY MEDICINE

## 2023-10-31 PROCEDURE — 85025 COMPLETE CBC W/AUTO DIFF WBC: CPT | Mod: ZL | Performed by: FAMILY MEDICINE

## 2023-10-31 PROCEDURE — 73502 X-RAY EXAM HIP UNI 2-3 VIEWS: CPT

## 2023-10-31 RX ORDER — RESPIRATORY SYNCYTIAL VIRUS VACCINE 120MCG/0.5
0.5 KIT INTRAMUSCULAR ONCE
Qty: 1 EACH | Refills: 0 | Status: SHIPPED | OUTPATIENT
Start: 2023-10-31 | End: 2023-10-31

## 2023-10-31 ASSESSMENT — ENCOUNTER SYMPTOMS
ABDOMINAL PAIN: 0
PALPITATIONS: 0
COUGH: 0
BREAST MASS: 0
NAUSEA: 0
ARTHRALGIAS: 1
CHILLS: 0
HEARTBURN: 0
DIARRHEA: 0
HEMATURIA: 0
HEMATOCHEZIA: 0
EYE PAIN: 0
JOINT SWELLING: 0
CONSTIPATION: 0
FEVER: 0
PARESTHESIAS: 0
NERVOUS/ANXIOUS: 0
WEAKNESS: 0
DIZZINESS: 0
SHORTNESS OF BREATH: 1
SORE THROAT: 0
FREQUENCY: 1
HEADACHES: 0
DYSURIA: 0
MYALGIAS: 0

## 2023-10-31 ASSESSMENT — ACTIVITIES OF DAILY LIVING (ADL): CURRENT_FUNCTION: NO ASSISTANCE NEEDED

## 2023-10-31 ASSESSMENT — PAIN SCALES - GENERAL: PAINLEVEL: MILD PAIN (2)

## 2023-10-31 NOTE — PATIENT INSTRUCTIONS
"Patient Education   Personalized Prevention Plan  You are due for the preventive services outlined below.  Your care team is available to assist you in scheduling these services.  If you have already completed any of these items, please share that information with your care team to update in your medical record.  Health Maintenance Due   Topic Date Due     Heart Failure Action Plan  Never done     Hepatitis C Screening  Never done     RSV VACCINE 60+ (1 - 1-dose 60+ series) Never done     Annual Wellness Visit  08/01/2023     Liver Monitoring Lab  08/09/2023     Cholesterol Lab  08/09/2023     Complete Blood Count  08/09/2023     COVID-19 Vaccine (7 - 2023-24 season) 09/01/2023     Kidney Microalbumin Urine Test  11/09/2023     Learning About Being Physically Active  What is physical activity?     Being physically active means doing any kind of activity that gets your body moving.  The types of physical activity that can help you get fit and stay healthy include:  Aerobic or \"cardio\" activities. These make your heart beat faster and make you breathe harder, such as brisk walking, riding a bike, or running. They strengthen your heart and lungs and build up your endurance.  Strength training activities. These make your muscles work against, or \"resist,\" something. Examples include lifting weights or doing push-ups. These activities help tone and strengthen your muscles and bones.  Stretches. These let you move your joints and muscles through their full range of motion. Stretching helps you be more flexible.  Reaching a balance between these three types of physical activity is important because each one contributes to your overall fitness.  What are the benefits of being active?  Being active is one of the best things you can do for your health. It helps you to:  Feel stronger and have more energy to do all the things you like to do.  Focus better at school or work.  Feel, think, and sleep better.  Reach and stay at a " "healthy weight.  Lose fat and build lean muscle.  Lower your risk for serious health problems, including diabetes, heart attack, high blood pressure, and some cancers.  Keep your heart, lungs, bones, muscles, and joints strong and healthy.  How can you make being active part of your life?  Start slowly. Make it your long-term goal to get at least 30 minutes of exercise on most days of the week. Walking is a good choice. You also may want to do other activities, such as running, swimming, cycling, or playing tennis or team sports.  Pick activities that you like--ones that make your heart beat faster, your muscles stronger, and your muscles and joints more flexible. If you find more than one thing you like doing, do them all. You don't have to do the same thing every day.  Get your heart pumping every day. Any activity that makes your heart beat faster and keeps it at that rate for a while counts.  Here are some great ways to get your heart beating faster:  Go for a brisk walk, run, or bike ride.  Go for a hike or swim.  Go in-line skating.  Play a game of touch football, basketball, or soccer.  Ride a bike.  Play tennis or racquetball.  Climb stairs.  Even some household chores can be aerobic--just do them at a faster pace. Vacuuming, raking or mowing the lawn, sweeping the garage, and washing and waxing the car all can help get your heart rate up.  Strengthen your muscles during the week. You don't have to lift heavy weights or grow big, bulky muscles to get stronger. Doing a few simple activities that make your muscles work against, or \"resist,\" something can help you get stronger.  For example, you can:  Do push-ups or sit-ups, which use your own body weight as resistance.  Lift weights or dumbbells or use stretch bands at home or in a gym or community center.  Stretch your muscles often. Stretching will help you as you become more active. It can help you stay flexible, loosen tight muscles, and avoid injury. It " "can also help improve your balance and posture and can be a great way to relax.  Be sure to stretch the muscles you'll be using when you work out. It's best to warm your muscles slightly before you stretch them. Walk or do some other light aerobic activity for a few minutes, and then start stretching.  When you stretch your muscles:  Do it slowly. Stretching is not about going fast or making sudden movements.  Don't push or bounce during a stretch.  Hold each stretch for at least 15 to 30 seconds, if you can. You should feel a stretch in the muscle, but not pain.  Breathe out as you do the stretch. Then breathe in as you hold the stretch. Don't hold your breath.  If you're worried about how more activity might affect your health, have a checkup before you start. Follow any special advice your doctor gives you for getting a smart start.  Where can you learn more?  Go to https://www.Palm.Minded/patiented  Enter W332 in the search box to learn more about \"Learning About Being Physically Active.\"  Current as of: October 10, 2022               Content Version: 13.7    6528-4159 [employer].   Care instructions adapted under license by your healthcare professional. If you have questions about a medical condition or this instruction, always ask your healthcare professional. [employer] disclaims any warranty or liability for your use of this information.      Learning About Dietary Guidelines  What are the Dietary Guidelines for Americans?     Dietary Guidelines for Americans provide tips for eating well and staying healthy. This helps reduce the risk for long-term (chronic) diseases.  These guidelines recommend that you:  Eat and drink the right amount for you. The U.S. government's food guide is called MyPlate. It can help you make your own well-balanced eating plan.  Try to balance your eating with your activity. This helps you stay at a healthy weight.  Drink alcohol in moderation, if at " "all.  Limit foods high in salt, saturated fat, trans fat, and added sugar.  These guidelines are from the U.S. Department of Agriculture and the U.S. Department of Health and Human Services. They are updated every 5 years.  What is MyPlate?  MyPlate is the U.S. government's food guide. It can help you make your own well-balanced eating plan. A balanced eating plan means that you eat enough, but not too much, and that your food gives you the nutrients you need to stay healthy.  MyPlate focuses on eating plenty of whole grains, fruits, and vegetables, and on limiting fat and sugar. It is available online at www.ChooseMyPlate.gov.  How can you get started?  If you're trying to eat healthier, you can slowly change your eating habits over time. You don't have to make big changes all at once. Start by adding one or two healthy foods to your meals each day.  Grains  Choose whole-grain breads and cereals and whole-wheat pasta and whole-grain crackers.  Vegetables  Eat a variety of vegetables every day. They have lots of nutrients and are part of a heart-healthy diet.  Fruits  Eat a variety of fruits every day. Fruits contain lots of nutrients. Choose fresh fruit instead of fruit juice.  Protein foods  Choose fish and lean poultry more often. Eat red meat and fried meats less often. Dried beans, tofu, and nuts are also good sources of protein.  Dairy  Choose low-fat or fat-free products from this food group. If you have problems digesting milk, try eating cheese or yogurt instead.  Fats and oils  Limit fats and oils if you're trying to cut calories. Choose healthy fats when you cook. These include canola oil and olive oil.  Where can you learn more?  Go to https://www.healthAvidRetail.net/patiented  Enter D676 in the search box to learn more about \"Learning About Dietary Guidelines.\"  Current as of: March 1, 2023               Content Version: 13.7 2006-2023 [employer].   Care instructions adapted under license by " your healthcare professional. If you have questions about a medical condition or this instruction, always ask your healthcare professional. [employer]wise, [facility] disclaims any warranty or liability for your use of this information.      Bladder Training: Care Instructions  Your Care Instructions     Bladder training is used to treat urge incontinence and stress incontinence. Urge incontinence means that the need to urinate comes on so fast that you can't get to a toilet in time. Stress incontinence means that you leak urine because of pressure on your bladder. For example, it may happen when you laugh, cough, or lift something heavy.  Bladder training can increase how long you can wait before you have to urinate. It can also help your bladder hold more urine. And it can give you better control over the urge to urinate.  It is important to remember that bladder training takes a few weeks to a few months to make a difference. You may not see results right away, but don't give up.  Follow-up care is a key part of your treatment and safety. Be sure to make and go to all appointments, and call your doctor if you are having problems. It's also a good idea to know your test results and keep a list of the medicines you take.  How can you care for yourself at home?  Work with your doctor to come up with a bladder training program that is right for you. You may use one or more of the following methods.  Delayed urination  In the beginning, try to keep from urinating for 5 minutes after you first feel the need to go.  While you wait, take deep, slow breaths to relax. Kegel exercises can also help you delay the need to go to the bathroom.  After some practice, when you can easily wait 5 minutes to urinate, try to wait 10 minutes before you urinate.  Slowly increase the waiting period until you are able to control when you have to urinate.  Scheduled urination  Empty your bladder when you first wake up in the  "morning.  Schedule times throughout the day when you will urinate.  Start by going to the bathroom every hour, even if you don't need to go.  Slowly increase the time between trips to the bathroom.  When you have found a schedule that works well for you, keep doing it.  If you wake up during the night and have to urinate, do it. Apply your schedule to waking hours only.  Kegel exercises  These tighten and strengthen pelvic muscles, which can help you control the flow of urine. (If doing these exercises causes pain, stop doing them and talk with your doctor.) To do Kegel exercises:  Squeeze your muscles as if you were trying not to pass gas. Or squeeze your muscles as if you were stopping the flow of urine. Your belly, legs, and buttocks shouldn't move.  Hold the squeeze for 3 seconds, then relax for 5 to 10 seconds.  Start with 3 seconds, then add 1 second each week until you are able to squeeze for 10 seconds.  Repeat the exercise 10 times a session. Do 3 to 8 sessions a day.  When should you call for help?  Watch closely for changes in your health, and be sure to contact your doctor if:    Your incontinence is getting worse.     You do not get better as expected.   Where can you learn more?  Go to https://www.Medopad.net/patiented  Enter V684 in the search box to learn more about \"Bladder Training: Care Instructions.\"  Current as of: March 1, 2023               Content Version: 13.7    0753-0973 [employer].   Care instructions adapted under license by your healthcare professional. If you have questions about a medical condition or this instruction, always ask your healthcare professional. [employer] disclaims any warranty or liability for your use of this information.         "

## 2023-10-31 NOTE — NURSING NOTE
"Chief Complaint   Patient presents with    Medicare Wellness            Initial /59 (BP Location: Right arm, Patient Position: Sitting, Cuff Size: Adult Regular)   Pulse 54   Temp 97.3  F (36.3  C) (Tympanic)   Resp 18   Ht 1.613 m (5' 3.5\")   Wt 84.8 kg (187 lb)   LMP  (LMP Unknown)   SpO2 97%   BMI 32.61 kg/m   Estimated body mass index is 32.61 kg/m  as calculated from the following:    Height as of this encounter: 1.613 m (5' 3.5\").    Weight as of this encounter: 84.8 kg (187 lb).     FOOD SECURITY SCREENING QUESTIONS:    The next two questions are to help us understand your food security.  If you are feeling you need any assistance in this area, we have resources available to support you today.    Hunger Vital Signs:  Within the past 12 months we worried whether our food would run out before we got money to buy more. Never  Within the past 12 months the food we bought just didn't last and we didn't have money to get more. Never      Magda Owens     "

## 2023-10-31 NOTE — PROGRESS NOTES
"She is at risk for lack of exercise and has been provided with information to increase physical activity for the benefit of her well-being.  The patient was counseled and encouraged to consider modifying their diet and eating habits. She was provided with information on recommended healthy diet options.  Information on urinary incontinence and treatment options given to patient.  Answers submitted by the patient for this visit:  Annual Preventive Visit (Submitted on 10/31/2023)  Chief Complaint: Annual Exam:  In general, how would you rate your overall physical health?: good  Frequency of exercise:: None  Do you usually eat at least 4 servings of fruit and vegetables a day, include whole grains & fiber, and avoid regularly eating high fat or \"junk\" foods? : No  Taking medications regularly:: Yes  Medication side effects:: None  Activities of Daily Living: no assistance needed  Home safety: throw rugs in the hallway, lack of grab bars in the bathroom  Hearing Impairment:: no hearing concerns  In the past 6 months, have you been bothered by leaking of urine?: Yes  abdominal pain: No  Blood in stool: No  Blood in urine: No  chest pain: No  chills: No  congestion: No  constipation: No  cough: No  diarrhea: No  dizziness: No  ear pain: No  eye pain: No  nervous/anxious: No  fever: No  frequency: Yes  genital sores: No  headaches: No  hearing loss: Yes  heartburn: No  arthralgias: Yes  joint swelling: No  peripheral edema: No  mood changes: No  myalgias: No  nausea: No  dysuria: No  palpitations: No  Skin sensation changes: No  sore throat: No  urgency: No  rash: No  shortness of breath: Yes  visual disturbance: Yes  weakness: No  pelvic pain: No  vaginal bleeding: No  vaginal discharge: No  tenderness: No  breast mass: No  breast discharge: No  In general, how would you rate your overall mental or emotional health?: good  Additional concerns today:: No    "

## 2023-10-31 NOTE — LETTER
"My Heart Failure Action Plan  Name: Hailey Shah   YOB: 1945  Date: 10/31/2023   My doctor:   Roxana Maki [facility]   [address]   [place] [id number] [phone] My Diagnosis: HF-rEF (EF < 40%)  My Ejection Fraction: No results found for: \"LVEF\"  45% - 49%  My Exercise Goal: Start exercise slowly - to begin, do a few minutes of exercise, several times a day. Increase your time and speed [id number] to build tolerance, with a goal of 30 minutes of exercise daily. Steady, slow, and consistent exercise is both safe and healthy. Stop and rest when you feel tired or become short of breath. Do not push yourself on days when you don t feel well.       My Weight Plan:   Wt Readings from Last 2 Encounters:   10/31/23 84.8 kg (187 lb)   07/06/23 83.9 kg (185 lb)     Weigh yourself daily using the same scale. If you gain more than 2 pounds in 24 hours or 5 pounds in 7 days. call the clinic    My Diet Goal: 2,000 mg of sodium    Emergency Room Visits:    Our goal is to improve your quality of life and help you avoid a visit to the emergency room or hospital.  If we work together, we can achieve this goal. But, if you feel you need to call 911 or go to the emergency room, please do so.  If you go to the emergency room, please bring your list of medicines and your daily weight chart with you.    Each day ask yourself:  Is my weight up?  Do I have swelling?  Do I have trouble breathing?  How did I sleep?  Other problems?       GREEN ZONE     Weight gained is no more than 2 pounds a day or 5 pounds a in 7 days.  No swelling in feet, ankles, legs or stomach.  No more swelling than usual.  No more trouble breathing than usual.  No change in my sleep.  No other problems. What should I do?  I am doing fine. I will take my medicine, follow my diet, see my doctor, exercise, and watch for symptoms.           YELLOW ZONE         Weight gain of more than 2 " pounds in one day or 5 pounds in 7 days.  New swelling in ankle, leg, knee or thigh.  Bloating in belly, pants feel tighter.  Swelling in hands or face.  Coughing or trouble breathing while walking or talking.  Harder to breathe last night.  Have trouble sleeping, wake up short of breath.  Unusually tired.  Not eating.  Nausea, vomiting, or diarrhea  Pain in my chest or bad  leg cramps.  Feel weak or dizzy. What should I do?  I need to take action and call my doctor or nurse today.                 RED ZONE         Weight gain of 5 pounds overnight.  Chest pain or pressure that does not go away.  Feel less alert.  Wheezing or have trouble breathing when at rest.  Cannot sleep lying down.  Cannot take my medicines.  Pass out or faint. What should I do?  I need to call my doctor or nurse now!  Call 911 if I have chest pain or cannot breathe.

## 2023-10-31 NOTE — PROGRESS NOTES
"SUBJECTIVE:   Hailey is a 77 year old who presents for Preventive Visit.      10/31/2023     1:58 PM   Additional Questions   Roomed by dinesh   Accompanied by self       Having a difficult time losing weight.  Doesn't eat much.  Doesn't snack.  Doesn't drink alcohol.  Eats a muffin in the morning for breakfast and a meal later in the day and that is it.  Drinks unsweetened beverages.  In the summers, she is working outside and active.  This time of year, she has other activities that keep her moving, although she doesn't formally exercise.    Having difficulty lifting left hip.  Has pain in her hip.  Had fallen 2-3 years ago and has had pain since then, which never completely went away.  Difficulty putting on socks/shoes.  Pain doesn't wake her or at rest.  Mild pain with walking.      She would like to establish locally with a cardiologist.      Are you in the first 12 months of your Medicare coverage?  No    Healthy Habits:     In general, how would you rate your overall health?  Good    Frequency of exercise:  None    Do you usually eat at least 4 servings of fruit and vegetables a day, include whole grains    & fiber and avoid regularly eating high fat or \"junk\" foods?  No    Taking medications regularly:  Yes    Medication side effects:  None    Ability to successfully perform activities of daily living:  No assistance needed    Home Safety:  Throw rugs in the hallway and lack of grab bars in the bathroom    Hearing Impairment:  No hearing concerns    In the past 6 months, have you been bothered by leaking of urine? Yes    In general, how would you rate your overall mental or emotional health?  Good    Additional concerns today:  No      Today's PHQ-2 Score:       10/31/2023     1:55 PM   PHQ-2 ( 1999 [employer])   Q1: Little interest or pleasure in doing things 1   Q2: Feeling down, depressed or hopeless 1   PHQ-2 Score 2   Q1: Little interest or pleasure in doing things Several days   Q2: Feeling down, depressed " or hopeless Several days   PHQ-2 Score 2           Have you ever done Advance Care Planning? (For example, a Health Directive, POLST, or a discussion with a medical provider or your loved ones about your wishes): No, advance care planning information given to patient to review.  Patient plans to discuss their wishes with loved ones or provider.             Cognitive Screening   1) Repeat 3 items (Leader, Season, Table)    2) Clock draw: NORMAL  3) 3 item recall: Recalls 3 objects  Results: 3 items recalled: COGNITIVE IMPAIRMENT LESS LIKELY    Mini-CogTM Copyright S Pippa. Licensed by the author for use in [facility]; reprinted with permission (angi@[facility]). All rights reserved.          Reviewed and updated as needed this visit by clinical staff   Tobacco  Allergies  Meds   Med Hx  Surg Hx  Fam Hx          Reviewed and updated as needed this visit by Provider                 Social History     Tobacco Use    Smoking status: Never    Smokeless tobacco: Never    Tobacco comments:     Quit smoking: 3-4 diet coke daily   Substance Use Topics    Alcohol use: No     Alcohol/week: 0.0 standard drinks of alcohol             10/31/2023     1:55 PM   Alcohol Use   Prescreen: >3 drinks/day or >7 drinks/week? Not Applicable     Do you have a current opioid prescription? No  Do you use any other controlled substances or medications that are not prescribed by a provider? None              Current providers sharing in care for this patient include:   Patient Care Team:  Roxana Maki MD as PCP - General (Family Practice)  Roxana Maki MD as Assigned PCP  Andria Macias MD as Assigned Surgical Provider  Arturo Casas DPM as Assigned Musculoskeletal Provider    The following health maintenance items are reviewed in Epic and correct as of today:  Health Maintenance   Topic Date Due    HF ACTION PLAN  Never done    HEPATITIS C SCREENING  Never done    RSV VACCINE 60+ (1 - 1-dose 60+  series) Never done    MEDICARE ANNUAL WELLNESS VISIT  08/01/2023    ALT  08/09/2023    LIPID  08/09/2023    CBC  08/09/2023    COVID-19 Vaccine (7 - 2023-24 season) 09/01/2023    MICROALBUMIN  11/09/2023    BMP  01/06/2024    HEMOGLOBIN  07/06/2024    DTAP/TDAP/TD IMMUNIZATION (2 - Td or Tdap) 10/30/2024    FALL RISK ASSESSMENT  10/31/2024    ADVANCE CARE PLANNING  08/01/2027    DEXA  06/30/2035    TSH W/FREE T4 REFLEX  Completed    PHQ-2 (once per calendar year)  Completed    INFLUENZA VACCINE  Completed    Pneumococcal Vaccine: 65+ Years  Completed    URINALYSIS  Completed    ZOSTER IMMUNIZATION  Completed    IPV IMMUNIZATION  Aged Out    HPV IMMUNIZATION  Aged Out    MENINGITIS IMMUNIZATION  Aged Out    RSV MONOCLONAL ANTIBODY  Aged Out    MAMMO SCREENING  Discontinued    COLORECTAL CANCER SCREENING  Discontinued     Labs reviewed in EPIC  BP Readings from Last 3 Encounters:   10/31/23 122/59   07/06/23 122/72   03/23/23 130/66    Wt Readings from Last 3 Encounters:   10/31/23 84.8 kg (187 lb)   07/06/23 83.9 kg (185 lb)   03/23/23 84.8 kg (187 lb)                  Patient Active Problem List   Diagnosis    Arthralgia of right hip    At risk for amiodarone toxicity with long term use    CKD (chronic kidney disease) stage 3, GFR 30-59 ml/min (H)    Family history of ischemic heart disease    Hyperlipidemia    Essential hypertension    Idiopathic cardiomyopathy (H)    Lumbago    PVC's (premature ventricular contractions)    Raynaud's disease    Arthropathy of shoulder region    Obesity    Raynaud's phenomenon (by history or observed)    RCT (rotator cuff tear)    Hypokalemia    Vasovagal syncope    Elevated troponin    Community acquired pneumonia of right lower lobe of lung    Abdominal aortic aneurysm (AAA) without rupture (H24)    Chronic systolic congestive heart failure (H)    Lung nodule     Past Surgical History:   Procedure Laterality Date    ARTHROSCOPY SHOULDER Right 1989    Shoulder surgery for  impingement    ARTHROSCOPY SHOULDER Left 1988     Left shoulder surgery for impingement    ARTHROSCOPY SHOULDER  08/2009    and debridement, distal clavical excision    Cardiac ablation for pvc      not successful    COLONOSCOPY      4/24/06,Inflammation noted, no further issues    COLONOSCOPY  07/28/2016 7/28/2016,wnl no need f/u    DILATION AND CURETTAGE      D&C for retained placentas x2    FINGER SURGERY Left 10/1989    fourth finger, cyst removal    HYSTERECTOMY TOTAL ABDOMINAL, BILATERAL SALPINGO-OOPHORECTOMY, COMBINED      1991,Soren-Clifton procedure    MAMMOPLASTY AUGMENTATION      1983    REPAIR BLADDER      7/1999, with [facility] and Juan Miguel procedure with cystoscopy       Social History     Tobacco Use    Smoking status: Never    Smokeless tobacco: Never    Tobacco comments:     Quit smoking: 3-4 diet coke daily   Substance Use Topics    Alcohol use: No     Alcohol/week: 0.0 standard drinks of alcohol     Family History   Problem Relation Age of Onset    Heart Disease Mother         Heart Disease,CHF    Hypertension Mother         Hypertension    Other - See Comments Mother         rheumatic fever    Other - See Comments Father         Old age    Breast Cancer Sister     Heart Disease Brother         Heart Disease,MI    Blood Disease No family hx of         Blood Disease,no clotting disorders         Current Outpatient Medications   Medication Sig Dispense Refill    amiodarone (PACERONE/CODARONE) 100 MG TABS tablet Take 150 mg by mouth daily Take 1 and 1/2 tablet by mouth daily 135 tablet 3    ascorbic acid (VITAMIN C) 500 MG tablet Take 500 mg by mouth daily       aspirin EC 81 MG EC tablet Take 81 mg by mouth daily       Calcium Carbonate-Vitamin D 500-125 MG-UNIT TABS Take 1 tablet by mouth 2 times daily       doxepin (SILENOR) 6 MG tablet TAKE 1 TABLET (6MG) BY MOUTH EVERY NIGHT AS NEEDED FOR SLEEP 90 tablet 3    fish oil-omega-3 fatty acids 1000 MG capsule Take 2 g by mouth 2 times daily       "furosemide (LASIX) 40 MG tablet Take 40 mg by mouth daily       KLOR-CON 20 MEQ CR tablet TAKE 1 TABLET (20 MEQ) BY MOUTH 2 TIMES DAILY *NEED APPOINTMENT FOR FUTURE REFILLS* 180 tablet 2    levothyroxine (SYNTHROID/LEVOTHROID) 25 MCG tablet TAKE 1 TABLET (25MCG) BY MOUTH EVERY  tablet 4    metoprolol succinate (TOPROL-XL) 50 MG 24 hr tablet TAKE 1 TABLET BY MOUTH EVERY DAY 30 tablet 0    Multiple Vitamin (MULTI-VITAMINS) TABS Take 1 tablet by mouth      rosuvastatin (CRESTOR) 40 MG tablet TAKE 1 TABLET BY MOUTH NIGHTLY AT BEDTIME *NEED APPOINTMENT FOR FUTURE REFILLS* 90 tablet 2    spironolactone (ALDACTONE) 25 MG tablet TAKE 1/2 TABLET BY MOUTH DAILY 45 tablet 4    UNABLE TO FIND MEDICATION NAME: OSTEO BIFLEX 1 tablet daily       Allergies   Allergen Reactions    Codeine GI Disturbance     \"gets sick\" per patient     Morphine Nausea     \"gets sick\" per patient     Sulfa Antibiotics Unknown     Other reaction(s): *Unknown - Childhood Rxn per her mother         Mammogram Screening - Patient over age 75, has elected to continue with screening.  Pertinent mammograms are reviewed under the imaging tab.    Review of Systems   Constitutional:  Negative for chills and fever.   HENT:  Positive for hearing loss. Negative for congestion, ear pain and sore throat.    Eyes:  Positive for visual disturbance. Negative for pain.   Respiratory:  Positive for shortness of breath. Negative for cough.    Cardiovascular:  Negative for chest pain, palpitations and peripheral edema.   Gastrointestinal:  Negative for abdominal pain, constipation, diarrhea, heartburn, hematochezia and nausea.   Breasts:  Negative for tenderness, breast mass and discharge.   Genitourinary:  Positive for frequency. Negative for dysuria, genital sores, hematuria, pelvic pain, urgency, vaginal bleeding and vaginal discharge.   Musculoskeletal:  Positive for arthralgias. Negative for joint swelling and myalgias.   Skin:  Negative for rash. " "  Neurological:  Negative for dizziness, weakness, headaches and paresthesias.   Psychiatric/Behavioral:  Negative for mood changes. The patient is not nervous/anxious.      Constitutional, HEENT, cardiovascular, pulmonary, GI, , musculoskeletal, neuro, skin, endocrine and psych systems are negative, except as otherwise noted.    OBJECTIVE:   /59 (BP Location: Right arm, Patient Position: Sitting, Cuff Size: Adult Regular)   Pulse 54   Temp 97.3  F (36.3  C) (Tympanic)   Resp 18   Ht 1.613 m (5' 3.5\")   Wt 84.8 kg (187 lb)   LMP  (LMP Unknown)   SpO2 97%   BMI 32.61 kg/m   Estimated body mass index is 32.61 kg/m  as calculated from the following:    Height as of this encounter: 1.613 m (5' 3.5\").    Weight as of this encounter: 84.8 kg (187 lb).  Physical Exam  Constitutional:       General: She is not in acute distress.     Appearance: She is well-developed.   HENT:      Head: Normocephalic.      Right Ear: Tympanic membrane and external ear normal.      Left Ear: Tympanic membrane and external ear normal.      Nose: Nose normal.      Mouth/Throat:      Mouth: Mucous membranes are moist.      Pharynx: Oropharynx is clear. No oropharyngeal exudate or posterior oropharyngeal erythema.   Eyes:      General:         Right eye: No discharge.         Left eye: No discharge.      Conjunctiva/sclera: Conjunctivae normal.      Pupils: Pupils are equal, round, and reactive to light.   Neck:      Thyroid: No thyromegaly.      Trachea: No tracheal deviation.   Cardiovascular:      Rate and Rhythm: Normal rate and regular rhythm.      Pulses: Normal pulses.      Heart sounds: Normal heart sounds, S1 normal and S2 normal. No murmur heard.     No friction rub. No gallop. No S3 or S4 sounds.   Pulmonary:      Effort: Pulmonary effort is normal. No respiratory distress.      Breath sounds: Normal breath sounds. No wheezing or rales.      Comments: Breast exam:  No masses palpable bilaterally.  No skin changes, " tethering or axillary lymphadenopathy bilaterally.    Abdominal:      General: Bowel sounds are normal. There is no distension.      Palpations: Abdomen is soft. There is no mass.      Tenderness: There is no abdominal tenderness.   Genitourinary:     Comments: Pelvic/Rectal exams deferred per patient.  Musculoskeletal:         General: Normal range of motion.      Cervical back: Neck supple.      Comments: Left hip:  normal range of motion with flexion.  Limited range of motion with internal and external rotation.   Lymphadenopathy:      Cervical: No cervical adenopathy.   Skin:     General: Skin is warm and dry.      Findings: No rash.   Neurological:      Mental Status: She is alert and oriented to person, place, and time.      Motor: No abnormal muscle tone.      Deep Tendon Reflexes: Reflexes are normal and symmetric.   Psychiatric:         Mood and Affect: Mood normal.         Thought Content: Thought content normal.         Judgment: Judgment normal.           Diagnostic Test Results:  Labs reviewed in Epic  Xray -   PROCEDURE: XR PELVIS AND HIP LEFT 2 VIEWS 10/31/2023 3:15 PM     HISTORY: Hip pain, left     COMPARISONS: None.     TECHNIQUE: Pelvis one view, left hip 2 views     FINDINGS: Pelvis: The pelvis is intact. The sacrum and sacroiliac  joints appear normal. There is moderate joint space narrowing at the  right hip with degenerative osteophytes.     Left hip 2 views: There is moderate decrease in height in the joint  space the left hip. Degenerative osteophytes are seen arising from the  femoral head. The acetabulum and proximal femur appears intact.                                                                        IMPRESSION: Osteoarthritic changes in both hips     FAMILIA BEASLEY MD         SYSTEM ID:  [id number]    ASSESSMENT / PLAN:       ICD-10-CM    1. Encounter for Medicare annual wellness exam  Z00.00       2. Abdominal aortic aneurysm (AAA) without rupture, unspecified part (H24)  I71.40  US Abdominal Aorta Imaging      3. Need for hepatitis C screening test  Z11.59 Hepatitis C Screen Reflex to HCV RNA Quant and Genotype     Hepatitis C Screen Reflex to HCV RNA Quant and Genotype      4. Need for vaccination against respiratory syncytial virus  Z29.11 respiratory syncytial virus vaccine, bivalent (ABRYSVO) injection      5. Essential hypertension  I10 Comprehensive metabolic panel     Comprehensive metabolic panel      6. Chronic systolic congestive heart failure (H)  I50.22 Adult Cardiology Eval  Referral      7. Hyperlipidemia, unspecified hyperlipidemia type  E78.5 Lipid Profile     Lipid Profile      8. Stage 3b chronic kidney disease (H)  N18.32 CBC with Platelets & Differential     Albumin Random Urine Quantitative with Creat Ratio     CBC with Platelets & Differential     Albumin Random Urine Quantitative with Creat Ratio      9. Need for COVID-19 vaccine  Z23       10. Screening for thyroid disorder  Z13.29 TSH Reflex GH     TSH Reflex GH      11. Hip pain, left  M25.552 XR Pelvis w Hip Left G/E 2 Views      12. Idiopathic cardiomyopathy (H)  I42.9 Adult Cardiology Eval  Referral      13. PVC's (premature ventricular contractions)  I49.3 Adult Cardiology Eval  Referral      14. Weight loss counseling, encounter for  Z71.3         1.  Mammogram is up to date, last completed 7/14/23.  DEXA last completed 6/30/2020 and was normal.  Colon cancer screening deferred due to age >75.  Pap Smear deferred due to age >65.  Abdominal aortic aneurysm screening previously showed small AAA.  Covid vaccine will be completed later as she had the illness in September.  Flu shot is up to date, Tdap, Shingrix, prevnar/pneumovax are up to date.  2.  Updated ultrasound ordered.  3.  Lab as above.  4.  Order for RSV vaccine sent to pharmacy.  5.  Stable.  Labs ordered as noted.  6.  Stable.  Referred to establish care with Cardiology locally with Dr. Beltran.  7.  Stable.  Labs as  "above.  8.  Labs ordered as above.  9.  TSH as above.  10.  X-ray showed she has some arthritis.  She doesn't feel that she would want to pursue Orthopedics evaluation at this time.  11.  Referred to Cardiology as above.  12.  See #11.  13.  Discussed healthy eating and exercise.    Patient has been advised of split billing requirements and indicates understanding: Yes      COUNSELING:  Reviewed preventive health counseling, as reflected in patient instructions       Regular exercise       Healthy diet/nutrition       Vision screening       Hearing screening       Dental care       Bladder control       Fall risk prevention       Osteoporosis prevention/bone health       Hepatitis C screening       HIV screening for high risk patient      BMI:   Estimated body mass index is 32.61 kg/m  as calculated from the following:    Height as of this encounter: 1.613 m (5' 3.5\").    Weight as of this encounter: 84.8 kg (187 lb).   Weight management plan: Discussed healthy diet and exercise guidelines      She reports that she has never smoked. She has never used smokeless tobacco.      Appropriate preventive services were discussed with this patient, including applicable screening as appropriate for fall prevention, nutrition, physical activity, Tobacco-use cessation, weight loss and cognition.  Checklist reviewing preventive services available has been given to the patient.    Reviewed patients plan of care and provided an AVS. The Basic Care Plan (routine screening as documented in Health Maintenance) for Hailey meets the Care Plan requirement. This Care Plan has been established and reviewed with the Patient.        Roxana Maki MD  [facility]    Identified Health Risks:  I have reviewed Opioid Use Disorder and Substance Use Disorder risk factors and made any needed referrals.   "

## 2023-11-01 LAB — HCV AB SERPL QL IA: NONREACTIVE

## 2023-11-14 ENCOUNTER — HOSPITAL ENCOUNTER (OUTPATIENT)
Dept: ULTRASOUND IMAGING | Facility: OTHER | Age: 78
Discharge: HOME | End: 2023-11-14
Attending: FAMILY MEDICINE | Admitting: FAMILY MEDICINE
Payer: COMMERCIAL

## 2023-11-14 DIAGNOSIS — I71.40 ABDOMINAL AORTIC ANEURYSM (AAA) WITHOUT RUPTURE, UNSPECIFIED PART (H): ICD-10-CM

## 2023-11-14 PROCEDURE — 76775 US EXAM ABDO BACK WALL LIM: CPT

## 2023-11-15 ENCOUNTER — TELEPHONE (OUTPATIENT)
Dept: FAMILY MEDICINE | Facility: OTHER | Age: 78
End: 2023-11-15
Payer: COMMERCIAL

## 2023-11-15 NOTE — TELEPHONE ENCOUNTER
Patient returning call, did not know who called, they said nurse in [address].  She thinks it maybe her xray results.  Please call      Deanna Orozco on 11/15/2023 at 2:16 PM

## 2023-11-16 NOTE — TELEPHONE ENCOUNTER
Called and left message for patient to please return call. See result note.     Josselin Bhakta LPN on 11/16/2023 at 8:28 AM   Sitting at 1152 TODAY ONLY

## 2023-11-17 NOTE — TELEPHONE ENCOUNTER
Left a detailed message on results per patient request.  Rosalinda Spring LPN 11/17/2023  8:58 AM

## 2023-11-17 NOTE — TELEPHONE ENCOUNTER
Patient returned a call  Patient requested that the nurse leave a detailed message on their phone the next time they call    Alli Steward on 11/17/2023 at 8:14 AM

## 2024-01-08 NOTE — PROGRESS NOTES
[facility] HEART CARE   CARDIOLOGY CONSULT     Hailey Shah   1945  [id number]    Roxana Maki     Chief Complaint   Patient presents with    Consult For     Establish care          HPI:   Mrs. Shah is a 78-year-old female who is being seen by cardiology to establish care.  She has been following up with [facility].  She is being seen locally as it is closer.  She has a history of systolic heart failure and should PVC induced, PVC's on Multaq and then amiodarone which was stopped on 1/9/2024, and a small AAA at 3.2 cm.    She has a history of frequent PVC's on with tach/amiodarone was discontinued on 1/9/2024, hypokalemia on potassium replacement, hypertension-controlled, CKD-3, hyperlipidemia-controlled, CKD-3, nonocclusive coronary disease on cath from 1/25/2013 with less than 50% blockages, and concern for carotid artery stenosis.    Hailey has a long history of heart failure.  Her EF was as low as 35-40% on 3/25/2013.  It appears that her heart failure may have been PVC induced.  Records suggest idiopathic.  Most recently, her echocardiogram showed an EF of 50% on 8/3/2022.  She was also noted to have wall motion abnormalities which has been an ongoing finding on previous imaging.  She had a cardiac MRI on 3/25/2013 through [facility].  The MRI showed an EF of 35-40%.  There was mild left ventricular enlargement, mild right ventricular dysfunction, with myocardial fibrosis/fatty replacement in the base to mid-apical anterior and mid myocardial walls.  She was noted to have mild mitral insufficiency.  Cardiac catheterization through [facility] on 1/25/2013 showed 30% proximal LAD, 40% proximal LCx, and 30% mid RCA.  No pulmonary embolus was noted.  She does not have a history of myocardial infarction, NSTEMI, or CABG.  She has not had any chest pain or chest tightness.  She has been mildly dyspneic on exertion walking up stairs or walking at a fast pace.  She has not had significant swelling.  She denied  chest tightness or anginal symptoms.  She has been diagnosed with idiopathic cardiomyopathy.    She also has a history of significantly symptomatic PVC's.  He had an ablation on 3/7/2014 for PVC's.  Summary stated very difficult multifocal PVC ablation.  3 dominant epicardial PV sites of origin.  Not all could be completely eliminated but overall PVC burden was reduced.  Greater than 5 additional unrelated PVC's of less frequency observed.  Findings of the studies were consistent with primary nonischemic cardiomyopathy with subsequent multifocal PVC's related to myocardial fibrosis. The PVC ablation appeared to go well, however, subsequent Holter monitor on 3/20/2014 again demonstrated a suboptimal result. 24.2% of all beats were ventricular ectopic beats, totaling 25,076.  PVC ablation to see if it improves symptoms and improve systolic function.  Previously, was on dronedarone/Multaq on 4/10/2014.  Started on amiodarone 4/27/2015.  Has been on amiodarone until discontinued on 1/9/2024.  It was explained to her at time of consultation that she needs PFT's, chest x-ray, and eye exam yearly on amiodarone.  She needs labs and EKG every 6 to 12 months.  She states she has not had these consistently.  Explained it was time to have these testing done.  Amiodarone was discontinued on 1/9/2024 to see how she does.  It is possible she will need to go back on the medication.    She also has a history of a AAA.  At time of consultation, it was measured 3.2 cm 11/14/2023.  Previously, on 8/26/2022 was 3.2 cm.  We did discuss this abnormality at the time of her consultation.      IMAGING RESULTS:   US abdominal aorta on 11/14/2023:  Stable abdominal aortic aneurysm measuring up to 3.2 cm.     US abdominal aorta on 8/26/2022:  Small fusiform aneurysm of the distal abdominal aorta up to 3.2 cm.    Echo on 8/3/2022 at [facility]:   1. Normal LV size, borderline wall thickness, normal global systolic function with an estimated EF of  50%.    2. Mid lateral segment, basal lateral segment, and basal inferior segment are hypokinetic.    3. Right ventricular cavity size is normal, global systolic RV function is normal with a borderline increase in the RVSP estimate.    4. Mildly enlarged left atrium.    5. Mild mitral regurgitation.     Echo on 2/26/2021:  The Ejection Fraction is estimated at 45-50%.  Mild to Moderate left ventricular dilation is present.  Right ventricular function, chamber size, wall motion, and thickness are  normal.  The inferior vena cava is normal.  No pericardial effusion is present.  There is no prior study for direct comparison.    Echo on 8/4/2015:   1. Moderately increased left ventricular size, normal wall thickness, mildly reduced global systolic function, calculated EF of 47 %.   2. Inferior wall, posterior wall, mid lateral segment, and basal lateral segment are abnormal.   3. Mildly enlarged left atrium.   4. The mitral valve is sclerotic, mild to moderate mitral regurgitation, eccentric jet directed posterolaterally. Etiology is like ischemic.   5. Compared to the previous echo images from 4/8/2015, the wall motion abnormalities, ejection fraction, and mitral regurgitation appear unchanged.    Ablation at [facility] on 3/7/2014:1.   Very difficult multifocal PVC ablation   ? 3 dominant epicardial PVC sites of origin.  Not all could be completely eliminated, but overall PVC burden was reduced.   ? >5 additional unrelated PVC sites of less frequency observed   ? Findings from the study are consistent with primary nonischemic cardiomyopathy with subsequent multifocal PVC's related to myocardial fibrosis     Cardiac MRI on 3/25/2013 at [facility]:  1. Idiopathic nonischemic cardiomyopathy.    A. Estimated ejection fraction 35-40% (ejection fraction is difficult to   assess because of very frequent ectopy and real time imaging).    B. Mild left ventricular enlargement.   2. Mild right ventricular dysfunction.   3. Diffuse  myocardial fibrosis and fatty replacement in the base to   mid-apical anterior wall-mid myocardial.    A. Non-coronary in distribution.   4. Normal aortic valve.   5. Mild mitral insufficiency.   6. Normal thickening and abdominal aorta and branch vessels.   7. No pulmonary emboli by pulmonary MRA.     [facility]:    The LMCA has mild luminal irregularities and is free of   significant disease.     30% stenosis in the Proximal LAD     40% stenosis in the Proximal Circumflex     30% stenosis in the Mid RCA.  Right dominant.     Left ventricular ejection fraction visually estimated at   35-40%. LV Pressure = 128/6.     CURRENT MEDICATIONS:   Prior to Admission medications    Medication Sig Start Date End Date Taking? Authorizing Provider   amiodarone (PACERONE/CODARONE) 100 MG TABS tablet Take 150 mg by mouth daily Take 1 and 1/2 tablet by mouth daily 1/16/20   Roxana Maki MD   ascorbic acid (VITAMIN C) 500 MG tablet Take 500 mg by mouth daily  1/16/13   Reported, Patient   aspirin EC 81 MG EC tablet Take 81 mg by mouth daily  1/16/13   Reported, Patient   Calcium Carbonate-Vitamin D 500-125 MG-UNIT TABS Take 1 tablet by mouth 2 times daily  1/16/13   Reported, Patient   doxepin (SILENOR) 6 MG tablet TAKE 1 TABLET (6MG) BY MOUTH EVERY NIGHT AS NEEDED FOR SLEEP 10/3/23   Roxana Maki MD   fish oil-omega-3 fatty acids 1000 MG capsule Take 2 g by mouth 2 times daily    Unknown, Entered By History   furosemide (LASIX) 40 MG tablet Take 40 mg by mouth daily  11/1/17   Reported, Patient   KLOR-CON 20 MEQ CR tablet TAKE 1 TABLET (20 MEQ) BY MOUTH 2 TIMES DAILY *NEED APPOINTMENT FOR FUTURE REFILLS* 9/12/23   Roxana Maki MD   levothyroxine (SYNTHROID/LEVOTHROID) 25 MCG tablet TAKE 1 TABLET (25MCG) BY MOUTH EVERY DAY 6/15/23   Roxana Maki MD   metoprolol succinate (TOPROL-XL) 50 MG 24 hr tablet TAKE 1 TABLET BY MOUTH EVERY DAY 11/28/18   Hernesto  "Ester Ogden MD   Multiple Vitamin (MULTI-VITAMINS) TABS Take 1 tablet by mouth 13   Reported, Patient   rosuvastatin (CRESTOR) 40 MG tablet TAKE 1 TABLET BY MOUTH NIGHTLY AT BEDTIME *NEED APPOINTMENT FOR FUTURE REFILLS* 23   Roxana Maki MD   spironolactone (ALDACTONE) 25 MG tablet TAKE 1/2 TABLET BY MOUTH DAILY 18   Ester Eric MD   UNABLE TO FIND MEDICATION NAME: OSTEO BIFLEX 1 tablet daily    Reported, Patient       ALLERGIES:   Allergies   Allergen Reactions    Codeine GI Disturbance     \"gets sick\" per patient     Morphine Nausea     \"gets sick\" per patient     Sulfa Antibiotics Unknown     Other reaction(s): *Unknown - Childhood Rxn per her mother        PAST MEDICAL HISTORY:   Past Medical History:   Diagnosis Date    Chronic diastolic heart failure (H)     2014,ECHO 10/30/2013 - Final Impressions:  1. Mild left ventricular enlargement with a mild decrease in systolic function, estimated ejection fraction 45%.  2. Mild right ventricular enlargement with normal systolic function.  3. Mild left atrial enlargement.  4. Mild mitral regurgitation.  5. Trace tricuspid regurgitation.  6. Mild left ventricular diastolic dysfunction.  7. When compared t*    Chronic kidney disease, stage III (moderate) (H)     2014    Essential (primary) hypertension     No Comments Provided    Hyperlipidemia     No Comments Provided    Low back pain     Chronic low back pain secondary to injury, , with chronic pain syndrome.    Other cardiomyopathies (CODE)     2013,Diagnosed by angio and cath study 2013 at [facility]. EF of 35% at first study    Personal history of other medical treatment (CODE)      with all vaginal deliveries    Raynaud's syndrome without gangrene     No Comments Provided    Tinnitus     No Comments Provided    Ventricular premature depolarization     3/25/2013    Ventricular tachycardia (H)     3/4/2014        PAST SURGICAL HISTORY:   Past Surgical History: "   Procedure Laterality Date    ARTHROSCOPY SHOULDER Right 1989    Shoulder surgery for impingement    ARTHROSCOPY SHOULDER Left 1988     Left shoulder surgery for impingement    ARTHROSCOPY SHOULDER  08/2009    and debridement, distal clavical excision    Cardiac ablation for pvc      not successful    COLONOSCOPY      4/24/06,Inflammation noted, no further issues    COLONOSCOPY  07/28/2016 7/28/2016,wnl no need f/u    DILATION AND CURETTAGE      D&C for retained placentas x2    FINGER SURGERY Left 10/1989    fourth finger, cyst removal    HYSTERECTOMY TOTAL ABDOMINAL, BILATERAL SALPINGO-OOPHORECTOMY, COMBINED      1991,Soren-Clifton procedure    MAMMOPLASTY AUGMENTATION      1983    REPAIR BLADDER      7/1999, with [facility] and Juan Miguel procedure with cystoscopy        FAMILY HISTORY:   Family History   Problem Relation Age of Onset    Heart Disease Mother         Heart Disease,CHF    Hypertension Mother         Hypertension    Other - See Comments Mother         rheumatic fever    Other - See Comments Father         Old age    Breast Cancer Sister     Heart Disease Brother         Heart Disease,MI    Blood Disease No family hx of         Blood Disease,no clotting disorders        SOCIAL HISTORY:   Social History     Socioeconomic History    Marital status:    Tobacco Use    Smoking status: Never    Smokeless tobacco: Never    Tobacco comments:     Quit smoking: 3-4 diet coke daily   Vaping Use    Vaping Use: Never used   Substance and Sexual Activity    Alcohol use: No     Alcohol/week: 0.0 standard drinks of alcohol    Drug use: Never    Sexual activity: Yes     Partners: Male   Social History Narrative    . Gerardo - .  Retired from [employer] as a -retired 1/18/2012.  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    Children: Fariba Umana, 1963, Ravindra, ND, Shreya Esquivel, 1965, Dominic Dwyer, 1967, Fouzia Dwyer, 1968, Miles     Social Determinants  of Health     Financial Resource Strain: Low Risk  (10/31/2023)    Financial Resource Strain     Within the past 12 months, have you or your family members you live with been unable to get utilities (heat, electricity) when it was really needed?: No   Food Insecurity: Low Risk  (10/31/2023)    Food Insecurity     Within the past 12 months, did you worry that your food would run out before you got money to buy more?: No     Within the past 12 months, did the food you bought just not last and you didn t have money to get more?: No   Transportation Needs: Low Risk  (10/31/2023)    Transportation Needs     Within the past 12 months, has lack of transportation kept you from medical appointments, getting your medicines, non-medical meetings or appointments, work, or from getting things that you need?: No   Interpersonal Safety: Low Risk  (10/31/2023)    Interpersonal Safety     Do you feel physically and emotionally safe where you currently live?: Yes     Within the past 12 months, have you been hit, slapped, kicked or otherwise physically hurt by someone?: No     Within the past 12 months, have you been humiliated or emotionally abused in other ways by your partner or ex-partner?: No   Housing Stability: Low Risk  (10/31/2023)    Housing Stability     Do you have housing? : Yes     Are you worried about losing your housing?: No          ROS:   CONSTITUTIONAL: No weight loss, fever, chills, weakness or fatigue.   CARDIOVASCULAR: No chest pain, chest pressure or chest discomfort. No palpitations or lower extremity edema.   RESPIRATORY: Admits to mild shortness of breath, dyspnea upon exertion, but no cough or sputum production.   NEUROLOGICAL: No headache, lightheadedness, dizziness, syncope, ataxia or weakness.   HEMATOLOGIC: No anemia, bleeding or bruising.         PHYSICAL EXAM:   GENERAL: The patient is a well-developed, well-nourished, in no apparent distress. Alert and oriented x3.   HEART: Regular rate and rhythm,  S1S2 present without murmur, rub or gallop.   LUNGS: Respirations regular and unlabored. Clear to auscultation.   EXTREMITIES: Scant peripheral edema present.   SKIN: No jaundice. No rashes or visible skin lesions present.        LAB RESULTS:   No visits with results within 2 Month(s) from this visit.   Latest known visit with results is:   Office Visit on 10/31/2023   Component Date Value Ref Range Status    Sodium 10/31/2023 141  135 - 145 mmol/L Final    Potassium 10/31/2023 3.7  3.4 - 5.3 mmol/L Final    Carbon Dioxide (CO2) 10/31/2023 28  22 - 29 mmol/L Final    Anion Gap 10/31/2023 12  7 - 15 mmol/L Final    Urea Nitrogen 10/31/2023 17.1  8.0 - 23.0 mg/dL Final    Creatinine 10/31/2023 1.56 (H)  0.51 - 0.95 mg/dL Final    GFR Estimate 10/31/2023 34 (L)  >60 mL/min/1.73m2 Final    Calcium 10/31/2023 10.1  8.8 - 10.2 mg/dL Final    Chloride 10/31/2023 101  98 - 107 mmol/L Final    Glucose 10/31/2023 104 (H)  70 - 99 mg/dL Final    Alkaline Phosphatase 10/31/2023 64  35 - 104 U/L Final    AST 10/31/2023 34  0 - 45 U/L Final    ALT 10/31/2023 28  0 - 50 U/L Final    Protein Total 10/31/2023 8.0  6.4 - 8.3 g/dL Final    Albumin 10/31/2023 4.8  3.5 - 5.2 g/dL Final    Bilirubin Total 10/31/2023 0.6  <=1.2 mg/dL Final    Cholesterol 10/31/2023 153  <200 mg/dL Final    Triglycerides 10/31/2023 58  <150 mg/dL Final    Direct Measure HDL 10/31/2023 74  >=50 mg/dL Final    LDL Cholesterol Calculated 10/31/2023 67  <=100 mg/dL Final    Non HDL Cholesterol 10/31/2023 79  <130 mg/dL Final    Hepatitis C Antibody 10/31/2023 Nonreactive  Nonreactive Final    Creatinine Urine mg/dL 10/31/2023 49.8  mg/dL Final    Albumin Urine mg/L 10/31/2023 <12.0  mg/L Final    Albumin Urine mg/g Cr 10/31/2023    Final    TSH 10/31/2023 3.18  0.30 - 4.20 uIU/mL Final    WBC Count 10/31/2023 6.5  4.0 - 11.0 10e3/uL Final    RBC Count 10/31/2023 4.52  3.80 - 5.20 10e6/uL Final    Hemoglobin 10/31/2023 14.1  11.7 - 15.7 g/dL Final    Hematocrit  10/31/2023 41.6  35.0 - 47.0 % Final    MCV 10/31/2023 92  78 - 100 fL Final    MCH 10/31/2023 31.2  26.5 - 33.0 pg Final    MCHC 10/31/2023 33.9  31.5 - 36.5 g/dL Final    RDW 10/31/2023 13.9  10.0 - 15.0 % Final    Platelet Count 10/31/2023 225  150 - 450 10e3/uL Final    % Neutrophils 10/31/2023 53  % Final    % Lymphocytes 10/31/2023 36  % Final    % Monocytes 10/31/2023 8  % Final    % Eosinophils 10/31/2023 2  % Final    % Basophils 10/31/2023 1  % Final    % Immature Granulocytes 10/31/2023 0  % Final    NRBCs per 100 WBC 10/31/2023 0  <1 /100 Final    Absolute Neutrophils 10/31/2023 3.5  1.6 - 8.3 10e3/uL Final    Absolute Lymphocytes 10/31/2023 2.3  0.8 - 5.3 10e3/uL Final    Absolute Monocytes 10/31/2023 0.5  0.0 - 1.3 10e3/uL Final    Absolute Eosinophils 10/31/2023 0.1  0.0 - 0.7 10e3/uL Final    Absolute Basophils 10/31/2023 0.0  0.0 - 0.2 10e3/uL Final    Absolute Immature Granulocytes 10/31/2023 0.0  <=0.4 10e3/uL Final    Absolute NRBCs 10/31/2023 0.0  10e3/uL Final          ASSESSMENT:       ICD-10-CM    1. Chronic systolic congestive heart failure (H)  I50.22 Echocardiogram Complete     Adult Cardiology Eval  Referral     EKG 12-lead, tracing only     X-ray Chest 2 vws*     furosemide (LASIX) 20 MG tablet     metoprolol succinate ER (TOPROL XL) 50 MG 24 hr tablet     spironolactone (ALDACTONE) 25 MG tablet     Basic metabolic panel     N terminal pro BNP outpatient      2. Idiopathic cardiomyopathy (H)  I42.9 Echocardiogram Complete     Adult Cardiology Eval  Referral     EKG 12-lead, tracing only     X-ray Chest 2 vws*     metoprolol succinate ER (TOPROL XL) 50 MG 24 hr tablet     spironolactone (ALDACTONE) 25 MG tablet     Basic metabolic panel     N terminal pro BNP outpatient      3. PVC's (premature ventricular contractions)  I49.3 Adult Cardiology Eval [facility] Referral     EKG 12-lead, tracing only     metoprolol succinate ER (TOPROL XL) 50 MG 24 hr tablet      4. At risk  for amiodarone toxicity with long term use  Z91.89 General PFT Lab (Please always keep checked)    Z79.899 Pulmonary Function Test      5. On amiodarone therapy  Z79.899 General PFT Lab (Please always keep checked)     Pulmonary Function Test     X-ray Chest 2 vws*      6. Hypokalemia  E87.6 potassium chloride ER (KLOR-CON) 20 MEQ CR tablet     spironolactone (ALDACTONE) 25 MG tablet     Basic metabolic panel      7. Essential hypertension  I10 metoprolol succinate ER (TOPROL XL) 50 MG 24 hr tablet     spironolactone (ALDACTONE) 25 MG tablet      8. Mixed hyperlipidemia  E78.2 rosuvastatin (CRESTOR) 40 MG tablet      9. Stage 3a chronic kidney disease (H)  N18.31 spironolactone (ALDACTONE) 25 MG tablet     N terminal pro BNP outpatient      10. Family history of ischemic heart disease  Z82.49 aspirin 81 MG EC tablet      11. Non-occlusive coronary artery disease  I25.10 aspirin 81 MG EC tablet     rosuvastatin (CRESTOR) 40 MG tablet      12. History of cardiac cath on 1/25/2013-[facility]  Z98.890 aspirin 81 MG EC tablet     metoprolol succinate ER (TOPROL XL) 50 MG 24 hr tablet     rosuvastatin (CRESTOR) 40 MG tablet    Mild disease, no stent      13. Abdominal aortic aneurysm (AAA) without rupture, unspecified part (H24)  I71.40 US Abdominal Aorta Imaging     rosuvastatin (CRESTOR) 40 MG tablet      14. Bruit  R09.89 US Carotid Bilateral            PLAN:   1.  CHF: Now with recovered EF.  Low normal EF at 50% on 8/3/2022.  EF was as low as 35-40% on 3/25/2013 on cardiac MRI.  Is highly suspicious that her reduced EF was from a high PVC burden which she previously had an ablation. She was on Multaq/amiodarone for PVC burden.  However, her previous cardiac notes suggest her heart failure was idiopathic.  She had an ischemic workup.  This included a cardiac MRI showing fibrosis.  She had a cardiac catheterization showing mild blockages dating back to 1/25/2013.  Will plan for an echocardiogram.  Because her EF is low  normal, decrease Lasix from 40 mg to 20 mg daily with the potential of increasing in the future.  Decrease potassium from 20 mEq twice a day to 20 mg once daily.  Spironolactone from 12.5 mg once daily to 25 mg daily.  Should consider ARB, Entresto, potentially an SGLT2 inhibitor if her echocardiogram shows a reduced EF in the future.  Today, she scant peripheral edema.  Increase spironolactone to 25 mg from 12.5 mg daily.  Decrease Lasix from 40 mg to 20 mg daily.  Labs in 2 weeks.  2.  PVC's: Has a history of high burden of PVC's.  Describes her PVC's as being rather symptomatic.  Had an ablation on 3/21/2013 through [facility] for PVC's.  They were not completely successful as there were multiple areas some of which could not be ablated.  She was on Multaq and then switched to amiodarone.  She has been on amiodarone since 4/27/2015.  I explained that she needs chest x-ray, PFT's, and eye exam on yearly basis while on amiodarone.  Will try discontinue amiodarone.  If she has PVC burden in the future is particular symptomatic, may consider sotalol versus restarting amiodarone.  3.  On amiodarone: Patient aware needs PFT's, chest x-ray, and eye exam and yearly basis.  Needs labs and EKG on yearly basis.  No PFT's on file, will obtain.  Will obtain chest x-ray today.  She does get her eyes examined on yearly basis.  Amiodarone discontinued 1/9/2024.  4.  AAA: At 3.2 cm on 11/14/2023.  Plan for another ultrasound in 1 year.  5.  Carotid artery stenosis: Concern for.  Plan for ultrasound of carotid arteries.  6.  Labs in 2 weeks with decreasing Lasix to 20 mg daily from 40 mg daily, increasing spironolactone from 12.5 mg to 25 mg daily, and decreasing potassium from 20 mg twice a day to 20 mg once daily.  7.  Labs in 2 weeks.  Labs to include BMP and BNP.  8.  Refill aspirin 81 mg daily, metoprolol 50 mg XL daily, and Crestor 40 mg daily.  9.  Follow-up 1 year or sooner if issues.    Total time spent on day of visit,  including review of tests, obtaining/reviewing separately obtained history, ordering medications/tests/procedures, communicating with PCP/consultants, and documenting in electronic medical record: 67 minutes.        Thank you for allowing me to participate in the care of your patient. Please do not hesitate to contact me if you have any questions.     Jean Beltran, DO

## 2024-01-09 ENCOUNTER — HOSPITAL ENCOUNTER (OUTPATIENT)
Dept: GENERAL RADIOLOGY | Facility: OTHER | Age: 79
Discharge: HOME | End: 2024-01-09
Attending: INTERNAL MEDICINE
Payer: COMMERCIAL

## 2024-01-09 ENCOUNTER — OFFICE VISIT (OUTPATIENT)
Dept: CARDIOLOGY | Facility: OTHER | Age: 79
End: 2024-01-09
Attending: INTERNAL MEDICINE
Payer: COMMERCIAL

## 2024-01-09 VITALS
BODY MASS INDEX: 32.6 KG/M2 | RESPIRATION RATE: 16 BRPM | DIASTOLIC BLOOD PRESSURE: 70 MMHG | WEIGHT: 184 LBS | SYSTOLIC BLOOD PRESSURE: 130 MMHG | TEMPERATURE: 97.5 F | OXYGEN SATURATION: 95 % | HEART RATE: 50 BPM | HEIGHT: 63 IN

## 2024-01-09 DIAGNOSIS — E87.6 HYPOKALEMIA: ICD-10-CM

## 2024-01-09 DIAGNOSIS — Z79.899 ON AMIODARONE THERAPY: ICD-10-CM

## 2024-01-09 DIAGNOSIS — Z82.49 FAMILY HISTORY OF ISCHEMIC HEART DISEASE: ICD-10-CM

## 2024-01-09 DIAGNOSIS — E78.2 MIXED HYPERLIPIDEMIA: ICD-10-CM

## 2024-01-09 DIAGNOSIS — I50.22 CHRONIC SYSTOLIC CONGESTIVE HEART FAILURE (H): ICD-10-CM

## 2024-01-09 DIAGNOSIS — I42.9 IDIOPATHIC CARDIOMYOPATHY (H): ICD-10-CM

## 2024-01-09 DIAGNOSIS — Z91.89 AT RISK FOR AMIODARONE TOXICITY WITH LONG TERM USE: ICD-10-CM

## 2024-01-09 DIAGNOSIS — N18.31 STAGE 3A CHRONIC KIDNEY DISEASE (H): ICD-10-CM

## 2024-01-09 DIAGNOSIS — I10 ESSENTIAL HYPERTENSION: ICD-10-CM

## 2024-01-09 DIAGNOSIS — Z79.899 AT RISK FOR AMIODARONE TOXICITY WITH LONG TERM USE: ICD-10-CM

## 2024-01-09 DIAGNOSIS — I25.10 NON-OCCLUSIVE CORONARY ARTERY DISEASE: ICD-10-CM

## 2024-01-09 DIAGNOSIS — I49.3 PVC'S (PREMATURE VENTRICULAR CONTRACTIONS): ICD-10-CM

## 2024-01-09 DIAGNOSIS — I71.40 ABDOMINAL AORTIC ANEURYSM (AAA) WITHOUT RUPTURE, UNSPECIFIED PART (H): ICD-10-CM

## 2024-01-09 DIAGNOSIS — R09.89 BRUIT: ICD-10-CM

## 2024-01-09 DIAGNOSIS — I50.22 CHRONIC SYSTOLIC CONGESTIVE HEART FAILURE (H): Primary | ICD-10-CM

## 2024-01-09 DIAGNOSIS — Z98.890 HISTORY OF CARDIAC CATH: ICD-10-CM

## 2024-01-09 LAB
ATRIAL RATE - MUSE: 50 BPM
DIASTOLIC BLOOD PRESSURE - MUSE: NORMAL MMHG
INTERPRETATION ECG - MUSE: NORMAL
P AXIS - MUSE: 58 DEGREES
PR INTERVAL - MUSE: 186 MS
QRS DURATION - MUSE: 124 MS
QT - MUSE: 466 MS
QTC - MUSE: 424 MS
R AXIS - MUSE: 25 DEGREES
SYSTOLIC BLOOD PRESSURE - MUSE: NORMAL MMHG
T AXIS - MUSE: 72 DEGREES
VENTRICULAR RATE- MUSE: 50 BPM

## 2024-01-09 PROCEDURE — 71046 X-RAY EXAM CHEST 2 VIEWS: CPT

## 2024-01-09 PROCEDURE — 93005 ELECTROCARDIOGRAM TRACING: CPT | Performed by: INTERNAL MEDICINE

## 2024-01-09 PROCEDURE — 93010 ELECTROCARDIOGRAM REPORT: CPT | Performed by: INTERNAL MEDICINE

## 2024-01-09 PROCEDURE — G0463 HOSPITAL OUTPT CLINIC VISIT: HCPCS | Mod: 25 | Performed by: INTERNAL MEDICINE

## 2024-01-09 PROCEDURE — 99205 OFFICE O/P NEW HI 60 MIN: CPT | Performed by: INTERNAL MEDICINE

## 2024-01-09 RX ORDER — ROSUVASTATIN CALCIUM 40 MG/1
TABLET, COATED ORAL
Qty: 90 TABLET | Refills: 3 | Status: SHIPPED | OUTPATIENT
Start: 2024-01-09

## 2024-01-09 RX ORDER — POTASSIUM CHLORIDE 1500 MG/1
TABLET, EXTENDED RELEASE ORAL
Qty: 90 TABLET | Refills: 3 | Status: SHIPPED | OUTPATIENT
Start: 2024-01-09

## 2024-01-09 RX ORDER — METOPROLOL SUCCINATE 50 MG/1
50 TABLET, EXTENDED RELEASE ORAL DAILY
Qty: 90 TABLET | Refills: 3 | Status: SHIPPED | OUTPATIENT
Start: 2024-01-09

## 2024-01-09 RX ORDER — ASPIRIN 81 MG/1
81 TABLET ORAL DAILY
Qty: 90 TABLET | Refills: 3 | Status: SHIPPED | OUTPATIENT
Start: 2024-01-09

## 2024-01-09 RX ORDER — SPIRONOLACTONE 25 MG/1
25 TABLET ORAL DAILY
Qty: 90 TABLET | Refills: 3 | Status: SHIPPED | OUTPATIENT
Start: 2024-01-09

## 2024-01-09 RX ORDER — FUROSEMIDE 20 MG
20 TABLET ORAL DAILY
Qty: 90 TABLET | Refills: 3 | Status: SHIPPED | OUTPATIENT
Start: 2024-01-09

## 2024-01-09 ASSESSMENT — PAIN SCALES - GENERAL: PAINLEVEL: NO PAIN (0)

## 2024-01-09 NOTE — PATIENT INSTRUCTIONS
1.  Labs in 2 weeks.  This is because of the decrease in potassium changes in medications.    2.  Chest x-ray today.  Will get a MyChart or letter with results.    3.  Stop amiodarone.    4.  Decrease Lasix from 40 mg once daily to 20 mg once daily.    5.  Decrease potassium from 40 mill equivalents to 20 mill equivalents daily.    6.  You will have an echocardiogram in the next few weeks.  They will call you to set this up.    7.  You have an ultrasound of carotid arteries in the next few weeks.  They will call you to set this up.    8.  You will have an ultrasound of abdominal aorta in 1 year.  They will call you to set this up.    9.  Increase spironolactone from 12.5 mg to 25 mg once a day.    10.  Follow-up 1 year or sooner with issues.    11.  While on amiodarone, you need a chest x-ray, pulmonary function testing, and eye exam on yearly basis.  You will need labs to every 6 to 12 months.

## 2024-01-09 NOTE — NURSING NOTE
"Patient comes in for consult to establish care for chronic systolic CHF.    Chief Complaint   Patient presents with    Consult For     Establish care       Initial /70 (BP Location: Right arm, Patient Position: Sitting, Cuff Size: Adult Large)   Pulse 50   Temp 97.5  F (36.4  C) (Temporal)   Resp 16   Ht 1.61 m (5' 3.39\")   Wt 83.5 kg (184 lb)   LMP  (LMP Unknown)   SpO2 95%   BMI 32.20 kg/m   Estimated body mass index is 32.2 kg/m  as calculated from the following:    Height as of this encounter: 1.61 m (5' 3.39\").    Weight as of this encounter: 83.5 kg (184 lb).  Meds Reconciled: complete  Pt is on Aspirin  Pt is on a Statin  PHQ and/or SANGEETA reviewed. Pt referred to PCP/MH Provider as appropriate.    Viviane Tang LPN      "

## 2024-01-10 RX ORDER — AMIODARONE HYDROCHLORIDE 100 MG/1
TABLET ORAL
Qty: 45 TABLET | Refills: 0 | OUTPATIENT
Start: 2024-01-10

## 2024-01-10 NOTE — TELEPHONE ENCOUNTER
Requested Prescriptions   Pending Prescriptions Disp Refills    amiodarone (PACERONE) 100 MG TABS tablet [Pharmacy Med Name: AMIODARONE 100MG TABLET] 45 tablet 0     Sig: TAKE 1 AND 1/2 TABLETS (150 MG) BY MOUTH ONCE DAILY       There is no refill protocol information for this order         Last Written Prescription Date HISTORICAL:  1/16/2020  Last Fill Quantity: 135,   # refills: 3  Last Office Visit: 1/9/24 and was discontinued by Jean Beltran DO due to therapy being completed  Future Office visit:   none    Routing refill request to provider for review/approval because:  Drug not on the [facility] or [facility] refill protocol or controlled substance  NOT ON MED LIST    Unable to complete prescription refill per RN Medication Refill Policy.   Christy Miller RN ....................  1/10/2024   2:05 PM

## 2024-01-29 ENCOUNTER — HOSPITAL ENCOUNTER (OUTPATIENT)
Dept: ULTRASOUND IMAGING | Facility: OTHER | Age: 79
Discharge: HOME | End: 2024-01-29
Attending: INTERNAL MEDICINE
Payer: COMMERCIAL

## 2024-01-29 ENCOUNTER — HOSPITAL ENCOUNTER (OUTPATIENT)
Dept: RESPIRATORY THERAPY | Facility: OTHER | Age: 79
Discharge: HOME | End: 2024-01-29
Attending: INTERNAL MEDICINE
Payer: COMMERCIAL

## 2024-01-29 ENCOUNTER — LAB (OUTPATIENT)
Dept: LAB | Facility: OTHER | Age: 79
End: 2024-01-29
Attending: INTERNAL MEDICINE
Payer: COMMERCIAL

## 2024-01-29 DIAGNOSIS — Z79.899 ON AMIODARONE THERAPY: ICD-10-CM

## 2024-01-29 DIAGNOSIS — E87.6 HYPOKALEMIA: ICD-10-CM

## 2024-01-29 DIAGNOSIS — R09.89 BRUIT: ICD-10-CM

## 2024-01-29 DIAGNOSIS — Z79.899 AT RISK FOR AMIODARONE TOXICITY WITH LONG TERM USE: ICD-10-CM

## 2024-01-29 DIAGNOSIS — Z91.89 AT RISK FOR AMIODARONE TOXICITY WITH LONG TERM USE: ICD-10-CM

## 2024-01-29 DIAGNOSIS — I50.22 CHRONIC SYSTOLIC CONGESTIVE HEART FAILURE (H): ICD-10-CM

## 2024-01-29 DIAGNOSIS — I42.9 IDIOPATHIC CARDIOMYOPATHY (H): ICD-10-CM

## 2024-01-29 DIAGNOSIS — N18.31 STAGE 3A CHRONIC KIDNEY DISEASE (H): ICD-10-CM

## 2024-01-29 LAB
ANION GAP SERPL CALCULATED.3IONS-SCNC: 9 MMOL/L (ref 7–15)
BUN SERPL-MCNC: 18.1 MG/DL (ref 8–23)
CALCIUM SERPL-MCNC: 9.8 MG/DL (ref 8.8–10.2)
CHLORIDE SERPL-SCNC: 104 MMOL/L (ref 98–107)
CREAT SERPL-MCNC: 1.57 MG/DL (ref 0.51–0.95)
DEPRECATED HCO3 PLAS-SCNC: 29 MMOL/L (ref 22–29)
EGFRCR SERPLBLD CKD-EPI 2021: 33 ML/MIN/1.73M2
GLUCOSE SERPL-MCNC: 75 MG/DL (ref 70–99)
NT-PROBNP SERPL-MCNC: 543 PG/ML (ref 0–1800)
POTASSIUM SERPL-SCNC: 4.1 MMOL/L (ref 3.4–5.3)
SODIUM SERPL-SCNC: 142 MMOL/L (ref 135–145)

## 2024-01-29 PROCEDURE — 83880 ASSAY OF NATRIURETIC PEPTIDE: CPT | Mod: ZL

## 2024-01-29 PROCEDURE — 94010 BREATHING CAPACITY TEST: CPT

## 2024-01-29 PROCEDURE — 94726 PLETHYSMOGRAPHY LUNG VOLUMES: CPT

## 2024-01-29 PROCEDURE — 93880 EXTRACRANIAL BILAT STUDY: CPT

## 2024-01-29 PROCEDURE — 999N000157 HC STATISTIC RCP TIME EA 10 MIN

## 2024-01-29 PROCEDURE — 94729 DIFFUSING CAPACITY: CPT | Mod: 26 | Performed by: STUDENT IN AN ORGANIZED HEALTH CARE EDUCATION/TRAINING PROGRAM

## 2024-01-29 PROCEDURE — 94010 BREATHING CAPACITY TEST: CPT | Mod: 26 | Performed by: STUDENT IN AN ORGANIZED HEALTH CARE EDUCATION/TRAINING PROGRAM

## 2024-01-29 PROCEDURE — 36415 COLL VENOUS BLD VENIPUNCTURE: CPT | Mod: ZL

## 2024-01-29 PROCEDURE — 94729 DIFFUSING CAPACITY: CPT

## 2024-01-29 PROCEDURE — 80048 BASIC METABOLIC PNL TOTAL CA: CPT | Mod: ZL

## 2024-01-29 PROCEDURE — 94726 PLETHYSMOGRAPHY LUNG VOLUMES: CPT | Mod: 26 | Performed by: STUDENT IN AN ORGANIZED HEALTH CARE EDUCATION/TRAINING PROGRAM

## 2024-01-30 ENCOUNTER — HOSPITAL ENCOUNTER (OUTPATIENT)
Dept: CARDIOLOGY | Facility: OTHER | Age: 79
Discharge: HOME | End: 2024-01-30
Attending: INTERNAL MEDICINE | Admitting: INTERNAL MEDICINE
Payer: COMMERCIAL

## 2024-01-30 DIAGNOSIS — I50.22 CHRONIC SYSTOLIC CONGESTIVE HEART FAILURE (H): ICD-10-CM

## 2024-01-30 DIAGNOSIS — I42.9 IDIOPATHIC CARDIOMYOPATHY (H): ICD-10-CM

## 2024-01-30 LAB — LVEF ECHO: NORMAL

## 2024-01-30 PROCEDURE — 255N000002 HC RX 255 OP 636: Performed by: INTERNAL MEDICINE

## 2024-01-30 PROCEDURE — 93306 TTE W/DOPPLER COMPLETE: CPT | Mod: 26 | Performed by: INTERNAL MEDICINE

## 2024-01-30 PROCEDURE — 999N000208 ECHOCARDIOGRAM COMPLETE

## 2024-01-30 PROCEDURE — C8929 TTE W OR WO FOL WCON,DOPPLER: HCPCS

## 2024-01-30 RX ADMIN — PERFLUTREN 5 ML: 6.52 INJECTION, SUSPENSION INTRAVENOUS at 14:34

## 2024-02-19 NOTE — TELEPHONE ENCOUNTER
"Requested Prescriptions   Pending Prescriptions Disp Refills    amiodarone (PACERONE) 100 MG TABS tablet [Pharmacy Med Name: AMIODARONE 100MG TABLET] 45 tablet 0     Sig: TAKE 1 AND 1/2 TABLETS (150 MG) BY MOUTH ONCE DAILY       There is no refill protocol information for this order         Last Written Prescription Date:  unknown, only added historically  Last Fill Quantity: unknown,   # refills: unknown  Last Office Visit: 1/9/24  Future Office visit:   none    Routing refill request to provider for review/approval because:  Drug not on the [facility], [facility] or [facility] refill protocol or controlled substance  Not on medication list    Note from last office visit on 1/9/24 with Jean Beltran DO states: \"On amiodarone: Patient aware needs PFT's, chest x-ray, and eye exam and yearly basis.  Needs labs and EKG on yearly basis.  No PFT's on file, will obtain.  Will obtain chest x-ray today.  She does get her eyes examined on yearly basis.  Amiodarone discontinued 1/9/2024.\"  I talked to patient and she states Jean Beltran DO took her off amiodarone at her last office visit with him.  Unable to complete prescription refill per RN Medication Refill Policy.   Christy Miller RN ....................  2/19/2024   8:50 AM    "

## 2024-02-20 RX ORDER — AMIODARONE HYDROCHLORIDE 100 MG/1
TABLET ORAL
Qty: 45 TABLET | Refills: 0 | OUTPATIENT
Start: 2024-02-20

## 2024-03-07 RX ORDER — FUROSEMIDE 40 MG
40 TABLET ORAL EVERY MORNING
Qty: 90 TABLET | Refills: 1 | OUTPATIENT
Start: 2024-03-07

## 2024-03-07 NOTE — TELEPHONE ENCOUNTER
"Requested Prescriptions   Pending Prescriptions Disp Refills    furosemide (LASIX) 40 MG tablet [Pharmacy Med Name: FUROSEMIDE 40MG TABLET] 90 tablet 1     Sig: TAKE 1 TABLET (40 MG) BY MOUTH EVERY MORNING.       Diuretics (Including Combos) Protocol Failed - 3/6/2024  1:02 PM        Failed - Has GFR on file in past 12 months and most recent value is normal        Failed - Medication indicated for associated diagnosis     Medication is associated with one or more of the following diagnoses:     Edema   Hypertension   Hypercalcemia   Heart Failure   Chronic Kidney Disease (CKD)        Passed - Blood pressure under 140/90 in past 12 months     BP Readings from Last 3 Encounters:   01/09/24 130/70   10/31/23 122/59   07/06/23 122/72           Passed - Medication is active on med list        Passed - Recent (12 mo) or future (90 days) visit within the authorizing provider's specialty     The patient must have completed an in-person or virtual visit within the past 12 months or has a future visit scheduled within the next 90 days with the authorizing provider s specialty.  Urgent care and e-visits do not quality as an office visit for this protocol.        Passed - Patient is age 18 or older        Passed - No active pregancy on record        Passed - No positive pregnancy test in past 12 months     Last Written Prescription Date:  20mg by mouth once daily 1/9/24  Last Fill Quantity: 90,   # refills: 3  Last Office Visit: 1/9/24 and note states: \"Because her EF is low normal, decrease Lasix from 40 mg to 20 mg daily with the potential of increasing in the future.\"  Future Office visit:   none    Routing refill request to provider for review/approval because:  Failed - Medication indicated for associated diagnosis  Old dose requested    Unable to complete prescription refill per RN Medication Refill Policy.   Refill refused per Dr. Beltran's last office visit  Christy Miller RN ....................  3/7/2024   7:48 AM  "

## 2024-06-04 DIAGNOSIS — E03.9 HYPOTHYROIDISM, UNSPECIFIED TYPE: ICD-10-CM

## 2024-06-06 RX ORDER — LEVOTHYROXINE SODIUM 25 UG/1
TABLET ORAL
Qty: 90 TABLET | Refills: 0 | Status: SHIPPED | OUTPATIENT
Start: 2024-06-06 | End: 2024-09-17

## 2024-06-06 NOTE — TELEPHONE ENCOUNTER
[facility] sent Rx request for the following:      Requested Prescriptions   Pending Prescriptions Disp Refills    levothyroxine (SYNTHROID/LEVOTHROID) 25 MCG tablet [Pharmacy Med Name: LEVOTHYROXINE 25MCG TABLET] 210 tablet 4     Sig: TAKE 1 TABLET (25 MCG) BY MOUTH EVERY DAY       Thyroid Protocol Passed - 6/6/2024 11:22 AM   Last Prescription Date:   6/16/23  Last Fill Qty/Refills:         210, R-4    Last Office Visit:              10/31/23   Future Office visit:            none     Ariana Ignacio RN on 6/6/2024 at 11:28 AM

## 2024-07-26 ENCOUNTER — HOSPITAL ENCOUNTER (OUTPATIENT)
Dept: MAMMOGRAPHY | Facility: OTHER | Age: 79
Discharge: HOME | End: 2024-07-26
Attending: FAMILY MEDICINE | Admitting: FAMILY MEDICINE
Payer: COMMERCIAL

## 2024-07-26 DIAGNOSIS — Z12.31 ENCOUNTER FOR SCREENING MAMMOGRAM FOR BREAST CANCER: ICD-10-CM

## 2024-07-26 PROCEDURE — 77063 BREAST TOMOSYNTHESIS BI: CPT

## 2024-09-02 DIAGNOSIS — G47.00 INSOMNIA, UNSPECIFIED TYPE: ICD-10-CM

## 2024-09-04 RX ORDER — DOXEPIN 6 MG/1
TABLET, FILM COATED ORAL
Qty: 90 TABLET | Refills: 0 | Status: SHIPPED | OUTPATIENT
Start: 2024-09-04

## 2024-09-04 NOTE — TELEPHONE ENCOUNTER
[id number] ([facility] of [place] sent Rx request for the following:      Requested Prescriptions   Pending Prescriptions Disp Refills    doxepin (SILENOR) 6 MG tablet [Pharmacy Med Name: DOXEPIN 6MG TABLET] 90 tablet 3     Sig: TAKE 1 TABLET (6MG) BY MOUTH EVERY NIGHT AS NEEDEDFOR SLEEP       There is no refill protocol information for this order        Last Prescription Date:   10/3/23  Last Fill Qty/Refills:         90, R-3    Last Office Visit:              10/31/23 (Physical)   Future Office visit:           11/1/24    Per LOV note:    Return in about 53 weeks (around 11/5/2024) for Annual Wellness Visit.    Unable to complete prescription refill per RN Medication Refill Policy. Stephanie Posey RN .............. 9/4/2024  1:53 PM

## 2024-09-12 DIAGNOSIS — E03.9 HYPOTHYROIDISM, UNSPECIFIED TYPE: ICD-10-CM

## 2024-09-17 RX ORDER — LEVOTHYROXINE SODIUM 25 UG/1
TABLET ORAL
Qty: 90 TABLET | Refills: 0 | Status: SHIPPED | OUTPATIENT
Start: 2024-09-17

## 2024-09-17 NOTE — TELEPHONE ENCOUNTER
[id number] sent Rx request for the following:      Requested Prescriptions   Pending Prescriptions Disp Refills    levothyroxine (SYNTHROID/LEVOTHROID) 25 MCG tablet [Pharmacy Med Name: LEVOTHYROXINE 25MCG TABLET] 90 tablet 0     Sig: TAKE 1 TABLET (25 MCG) BY MOUTH EVERY DAY       Thyroid Protocol Passed - 9/12/2024  5:04 AM        Passed - Patient is 12 years or older        Passed - Recent (12 mo) or future (30 days) visit within the authorizing provider's specialty     The patient must have completed an in-person or virtual visit within the past 12 months or has a future visit scheduled within the next 90 days with the authorizing provider s specialty.  Urgent care and e-visits do not quality as an office visit for this protocol.          Passed - Medication is active on med list        Passed - Medication indicated for associated diagnosis     Medication is associated with one or more of the following diagnoses:  Hypothyroidism  Thyroid stimulating hormone suppression therapy  Thyroid cancer  Acquired atrophy of thyroid          Passed - Normal TSH on file in past 12 months     Recent Labs   Lab Test 10/31/23  1502   TSH 3.18              Passed - No active pregnancy on record     If patient is pregnant or has had a positive pregnancy test, please check TSH.          Passed - No positive pregnancy test in past 12 months     If patient is pregnant or has had a positive pregnancy test, please check TSH.               Last Prescription Date:   6/6/24  Last Fill Qty/Refills:         90, R-0    Last Office Visit:              10/31/23 (Medicare - [facility])   Future Office visit:           11/1/24  Next 5 appointments (look out 90 days)      Nov 01, 2024 8:40 AM  (Arrive by 8:25 AM)  Annual Wellness Visit with Roxana Maki MD  [facility] ([facility] ) [address]  [place] [address] [phone]

## 2024-11-01 ENCOUNTER — OFFICE VISIT (OUTPATIENT)
Dept: FAMILY MEDICINE | Facility: OTHER | Age: 79
End: 2024-11-01
Attending: FAMILY MEDICINE
Payer: COMMERCIAL

## 2024-11-01 VITALS
DIASTOLIC BLOOD PRESSURE: 74 MMHG | HEIGHT: 63 IN | RESPIRATION RATE: 16 BRPM | OXYGEN SATURATION: 97 % | SYSTOLIC BLOOD PRESSURE: 116 MMHG | HEART RATE: 60 BPM | TEMPERATURE: 97 F | BODY MASS INDEX: 32.39 KG/M2 | WEIGHT: 182.8 LBS

## 2024-11-01 DIAGNOSIS — I25.10 NON-OCCLUSIVE CORONARY ARTERY DISEASE: ICD-10-CM

## 2024-11-01 DIAGNOSIS — I50.22 CHRONIC SYSTOLIC CONGESTIVE HEART FAILURE (H): ICD-10-CM

## 2024-11-01 DIAGNOSIS — Z00.00 ENCOUNTER FOR MEDICARE ANNUAL WELLNESS EXAM: Primary | ICD-10-CM

## 2024-11-01 DIAGNOSIS — E03.9 HYPOTHYROIDISM, UNSPECIFIED TYPE: ICD-10-CM

## 2024-11-01 DIAGNOSIS — N18.32 STAGE 3B CHRONIC KIDNEY DISEASE (H): ICD-10-CM

## 2024-11-01 DIAGNOSIS — Z23 NEED FOR TDAP VACCINATION: ICD-10-CM

## 2024-11-01 DIAGNOSIS — I71.40 ABDOMINAL AORTIC ANEURYSM (AAA) WITHOUT RUPTURE, UNSPECIFIED PART (H): ICD-10-CM

## 2024-11-01 LAB
ALBUMIN SERPL BCG-MCNC: 4.5 G/DL (ref 3.5–5.2)
ALP SERPL-CCNC: 50 U/L (ref 40–150)
ALT SERPL W P-5'-P-CCNC: 22 U/L (ref 0–50)
ANION GAP SERPL CALCULATED.3IONS-SCNC: 10 MMOL/L (ref 7–15)
AST SERPL W P-5'-P-CCNC: 30 U/L (ref 0–45)
BILIRUB SERPL-MCNC: 0.6 MG/DL
BUN SERPL-MCNC: 17.2 MG/DL (ref 8–23)
CALCIUM SERPL-MCNC: 9.6 MG/DL (ref 8.8–10.4)
CHLORIDE SERPL-SCNC: 102 MMOL/L (ref 98–107)
CHOLEST SERPL-MCNC: 150 MG/DL
CREAT SERPL-MCNC: 1.22 MG/DL (ref 0.51–0.95)
CREAT UR-MCNC: 12.7 MG/DL
EGFRCR SERPLBLD CKD-EPI 2021: 45 ML/MIN/1.73M2
ERYTHROCYTE [DISTWIDTH] IN BLOOD BY AUTOMATED COUNT: 13.7 % (ref 10–15)
FASTING STATUS PATIENT QL REPORTED: ABNORMAL
FASTING STATUS PATIENT QL REPORTED: NORMAL
GLUCOSE SERPL-MCNC: 92 MG/DL (ref 70–99)
HCO3 SERPL-SCNC: 27 MMOL/L (ref 22–29)
HCT VFR BLD AUTO: 41.3 % (ref 35–47)
HDLC SERPL-MCNC: 71 MG/DL
HGB BLD-MCNC: 13.8 G/DL (ref 11.7–15.7)
LDLC SERPL CALC-MCNC: 61 MG/DL
MCH RBC QN AUTO: 31 PG (ref 26.5–33)
MCHC RBC AUTO-ENTMCNC: 33.4 G/DL (ref 31.5–36.5)
MCV RBC AUTO: 93 FL (ref 78–100)
MICROALBUMIN UR-MCNC: <12 MG/L
MICROALBUMIN/CREAT UR: NORMAL MG/G{CREAT}
NONHDLC SERPL-MCNC: 79 MG/DL
PLATELET # BLD AUTO: 205 10E3/UL (ref 150–450)
POTASSIUM SERPL-SCNC: 4.2 MMOL/L (ref 3.4–5.3)
PROT SERPL-MCNC: 7.6 G/DL (ref 6.4–8.3)
RBC # BLD AUTO: 4.45 10E6/UL (ref 3.8–5.2)
SODIUM SERPL-SCNC: 139 MMOL/L (ref 135–145)
TRIGL SERPL-MCNC: 88 MG/DL
TSH SERPL DL<=0.005 MIU/L-ACNC: 2.71 UIU/ML (ref 0.3–4.2)
WBC # BLD AUTO: 6.2 10E3/UL (ref 4–11)

## 2024-11-01 PROCEDURE — G0463 HOSPITAL OUTPT CLINIC VISIT: HCPCS | Performed by: FAMILY MEDICINE

## 2024-11-01 PROCEDURE — 82043 UR ALBUMIN QUANTITATIVE: CPT | Mod: ZL | Performed by: FAMILY MEDICINE

## 2024-11-01 PROCEDURE — 84155 ASSAY OF PROTEIN SERUM: CPT | Mod: ZL | Performed by: FAMILY MEDICINE

## 2024-11-01 PROCEDURE — 85014 HEMATOCRIT: CPT | Mod: ZL | Performed by: FAMILY MEDICINE

## 2024-11-01 PROCEDURE — 84443 ASSAY THYROID STIM HORMONE: CPT | Mod: ZL | Performed by: FAMILY MEDICINE

## 2024-11-01 PROCEDURE — 36415 COLL VENOUS BLD VENIPUNCTURE: CPT | Mod: ZL | Performed by: FAMILY MEDICINE

## 2024-11-01 PROCEDURE — 99213 OFFICE O/P EST LOW 20 MIN: CPT | Mod: 25 | Performed by: FAMILY MEDICINE

## 2024-11-01 PROCEDURE — 80061 LIPID PANEL: CPT | Mod: ZL | Performed by: FAMILY MEDICINE

## 2024-11-01 PROCEDURE — G0439 PPPS, SUBSEQ VISIT: HCPCS | Performed by: FAMILY MEDICINE

## 2024-11-01 SDOH — HEALTH STABILITY: PHYSICAL HEALTH: ON AVERAGE, HOW MANY MINUTES DO YOU ENGAGE IN EXERCISE AT THIS LEVEL?: 20 MIN

## 2024-11-01 SDOH — HEALTH STABILITY: PHYSICAL HEALTH: ON AVERAGE, HOW MANY DAYS PER WEEK DO YOU ENGAGE IN MODERATE TO STRENUOUS EXERCISE (LIKE A BRISK WALK)?: 3 DAYS

## 2024-11-01 ASSESSMENT — SOCIAL DETERMINANTS OF HEALTH (SDOH): HOW OFTEN DO YOU GET TOGETHER WITH FRIENDS OR RELATIVES?: TWICE A WEEK

## 2024-11-01 ASSESSMENT — PAIN SCALES - GENERAL: PAINLEVEL_OUTOF10: NO PAIN (0)

## 2024-11-01 NOTE — PATIENT INSTRUCTIONS
Patient Education   Preventive Care Advice   This is general advice given by our system to help you stay healthy. However, your care team may have specific advice just for you. Please talk to your care team about your preventive care needs.  Nutrition  Eat 5 or more servings of fruits and vegetables each day.  Try wheat bread, brown rice and whole grain pasta (instead of white bread, rice, and pasta).  Get enough calcium and vitamin D. Check the label on foods and aim for 100% of the RDA (recommended daily allowance).  Lifestyle  Exercise at least 150 minutes each week  (30 minutes a day, 5 days a week).  Do muscle strengthening activities 2 days a week. These help control your weight and prevent disease.  No smoking.  Wear sunscreen to prevent skin cancer.  Have a dental exam and cleaning every 6 months.  Yearly exams  See your health care team every year to talk about:  Any changes in your health.  Any medicines your care team has prescribed.  Preventive care, family planning, and ways to prevent chronic diseases.  Shots (vaccines)   HPV shots (up to age 26), if you've never had them before.  Hepatitis B shots (up to age 59), if you've never had them before.  COVID-19 shot: Get this shot when it's due.  Flu shot: Get a flu shot every year.  Tetanus shot: Get a tetanus shot every 10 years.  Pneumococcal, hepatitis A, and RSV shots: Ask your care team if you need these based on your risk.  Shingles shot (for age 50 and up)  General health tests  Diabetes screening:  Starting at age 35, Get screened for diabetes at least every 3 years.  If you are younger than age 35, ask your care team if you should be screened for diabetes.  Cholesterol test: At age 39, start having a cholesterol test every 5 years, or more often if advised.  Bone density scan (DEXA): At age 50, ask your care team if you should have this scan for osteoporosis (brittle bones).  Hepatitis C: Get tested at least once in your life.  STIs (sexually  transmitted infections)  Before age 24: Ask your care team if you should be screened for STIs.  After age 24: Get screened for STIs if you're at risk. You are at risk for STIs (including HIV) if:  You are sexually active with more than one person.  You don't use condoms every time.  You or a partner was diagnosed with a sexually transmitted infection.  If you are at risk for HIV, ask about PrEP medicine to prevent HIV.  Get tested for HIV at least once in your life, whether you are at risk for HIV or not.  Cancer screening tests  Cervical cancer screening: If you have a cervix, begin getting regular cervical cancer screening tests starting at age 21.  Breast cancer scan (mammogram): If you've ever had breasts, begin having regular mammograms starting at age 40. This is a scan to check for breast cancer.  Colon cancer screening: It is important to start screening for colon cancer at age 45.  Have a colonoscopy test every 10 years (or more often if you're at risk) Or, ask your provider about stool tests like a FIT test every year or Cologuard test every 3 years.  To learn more about your testing options, visit:   .  For help making a decision, visit:   https://bit.ly/ce91621.  Prostate cancer screening test: If you have a prostate, ask your care team if a prostate cancer screening test (PSA) at age 55 is right for you.  Lung cancer screening: If you are a current or former smoker ages 50 to 80, ask your care team if ongoing lung cancer screenings are right for you.  For informational purposes only. Not to replace the advice of your health care provider. Copyright   2023 [facility]. All rights reserved. Clinically reviewed by the [facility]. [employer] 825334 - REV 01/24.  Learning About Sleeping Well  What does sleeping well mean?     Sleeping well means getting enough sleep to feel good and stay healthy. How much sleep is enough varies among people.  The number of hours you  sleep and how you feel when you wake up are both important. If you do not feel refreshed, you probably need more sleep. Another sign of not getting enough sleep is feeling tired during the day.  Experts recommend that adults get at least 7 or more hours of sleep per day. Children and older adults need more sleep.  Why is getting enough sleep important?  Getting enough quality sleep is a basic part of good health. When your sleep suffers, your physical health, mood, and your thoughts can suffer too. You may find yourself feeling more grumpy or stressed. Not getting enough sleep also can lead to serious problems, including injury, accidents, anxiety, and depression.  What might cause poor sleeping?  Many things can cause sleep problems, including:  Changes to your sleep schedule.  Stress. Stress can be caused by fear about a single event, such as giving a speech. Or you may have ongoing stress, such as worry about work or school.  Depression, anxiety, and other mental or emotional conditions.  Changes in your sleep habits or surroundings. This includes changes that happen where you sleep, such as noise, light, or sleeping in a different bed. It also includes changes in your sleep pattern, such as having jet lag or working a late shift.  Health problems, such as pain, breathing problems, and restless legs syndrome.  Lack of regular exercise.  Using alcohol, nicotine, or caffeine before bed.  How can you help yourself?  Here are some tips that may help you sleep more soundly and wake up feeling more refreshed.  Your sleeping area   Use your bedroom only for sleeping and sex. A bit of light reading may help you fall asleep. But if it doesn't, do your reading elsewhere in the house. Try not to use your TV, computer, smartphone, or tablet while you are in bed.  Be sure your bed is big enough to stretch out comfortably, especially if you have a sleep partner.  Keep your bedroom quiet, dark, and cool. Use curtains, blinds,  "or a sleep mask to block out light. To block out noise, use earplugs, soothing music, or a \"white noise\" machine.  Your evening and bedtime routine   Create a relaxing bedtime routine. You might want to take a warm shower or bath, or listen to soothing music.  Go to bed at the same time every night. And get up at the same time every morning, even if you feel tired.  What to avoid   Limit caffeine (coffee, tea, caffeinated sodas) during the day, and don't have any for at least 6 hours before bedtime.  Avoid drinking alcohol before bedtime. Alcohol can cause you to wake up more often during the night.  Try not to smoke or use tobacco, especially in the evening. Nicotine can keep you awake.  Limit naps during the day, especially close to bedtime.  Avoid lying in bed awake for too long. If you can't fall asleep or if you wake up in the middle of the night and can't get back to sleep within about 20 minutes, get out of bed and go to another room until you feel sleepy.  Avoid taking medicine right before bed that may keep you awake or make you feel hyper or energized. Your doctor can tell you if your medicine may do this and if you can take it earlier in the day.  If you can't sleep   Imagine yourself in a peaceful, pleasant scene. Focus on the details and feelings of being in a place that is relaxing.  Get up and do a quiet or boring activity until you feel sleepy.  Avoid drinking any liquids before going to bed to help prevent waking up often to use the bathroom.  Where can you learn more?  Go to https://www.[employer].net/patiented  Enter J942 in the search box to learn more about \"Learning About Sleeping Well.\"  Current as of: July 10, 2023  Content Version: 14.2 2024 [employer].   Care instructions adapted under license by your healthcare professional. If you have questions about a medical condition or this instruction, always ask your healthcare professional. [employer] disclaims any " warranty or liability for your use of this information.    Bladder Training: Care Instructions  Your Care Instructions     Bladder training is used to treat urge incontinence and stress incontinence. Urge incontinence means that the need to urinate comes on so fast that you can't get to a toilet in time. Stress incontinence means that you leak urine because of pressure on your bladder. For example, it may happen when you laugh, cough, or lift something heavy.  Bladder training can increase how long you can wait before you have to urinate. It can also help your bladder hold more urine. And it can give you better control over the urge to urinate.  It is important to remember that bladder training takes a few weeks to a few months to make a difference. You may not see results right away, but don't give up.  Follow-up care is a key part of your treatment and safety. Be sure to make and go to all appointments, and call your doctor if you are having problems. It's also a good idea to know your test results and keep a list of the medicines you take.  How can you care for yourself at home?  Work with your doctor to come up with a bladder training program that is right for you. You may use one or more of the following methods.  Delayed urination  In the beginning, try to keep from urinating for 5 minutes after you first feel the need to go.  While you wait, take deep, slow breaths to relax. Kegel exercises can also help you delay the need to go to the bathroom.  After some practice, when you can easily wait 5 minutes to urinate, try to wait 10 minutes before you urinate.  Slowly increase the waiting period until you are able to control when you have to urinate.  Scheduled urination  Empty your bladder when you first wake up in the morning.  Schedule times throughout the day when you will urinate.  Start by going to the bathroom every hour, even if you don't need to go.  Slowly increase the time between trips to the  "bathroom.  When you have found a schedule that works well for you, keep doing it.  If you wake up during the night and have to urinate, do it. Apply your schedule to waking hours only.  Kegel exercises  These tighten and strengthen pelvic muscles, which can help you control the flow of urine. (If doing these exercises causes pain, stop doing them and talk with your doctor.) To do Kegel exercises:  Squeeze your muscles as if you were trying not to pass gas. Or squeeze your muscles as if you were stopping the flow of urine. Your belly, legs, and buttocks shouldn't move.  Hold the squeeze for 3 seconds, then relax for 5 to 10 seconds.  Start with 3 seconds, then add 1 second each week until you are able to squeeze for 10 seconds.  Repeat the exercise 10 times a session. Do 3 to 8 sessions a day.  When should you call for help?  Watch closely for changes in your health, and be sure to contact your doctor if:    Your incontinence is getting worse.     You do not get better as expected.   Where can you learn more?  Go to https://www.[employer].net/patiented  Enter V684 in the search box to learn more about \"Bladder Training: Care Instructions.\"  Current as of: November 15, 2023  Content Version: 14.2 2024 [facility] [employer].   Care instructions adapted under license by your healthcare professional. If you have questions about a medical condition or this instruction, always ask your healthcare professional. [employer] disclaims any warranty or liability for your use of this information.       "

## 2024-11-01 NOTE — PROGRESS NOTES
Preventive Care Visit  [facility] AND [facility]  Roxana Maki MD, Family Medicine  Nov 1, 2024          Subjective   Hailey is a 78 year old, presenting for the following:  Physical (Medicare Wellness)        11/1/2024     8:29 AM   Additional Questions   Roomed by BRET Givens    Hailey is here today for a Medicare Wellness Visit.      Has noticed a little worsening of her shortness of breath.  Her ankles might be a little more swollen at times.  She is still able to lay flat to sleep at night.  No paroxysmal nocturnal dyspnea.  The shortness of breath is with exertion.  No chest pain.  Not drastically different.               11/1/2024   General Health   How would you rate your overall physical health? Good   Feel stress (tense, anxious, or unable to sleep) Only a little      (!) STRESS CONCERN      11/1/2024   Nutrition   Diet: Low salt            11/1/2024   Exercise   Days per week of moderate/strenous exercise 3 days   Average minutes spent exercising at this level 20 min            11/1/2024   Social Factors   Frequency of gathering with friends or relatives Twice a week   Worry food won't last until get money to buy more No   Food not last or not have enough money for food? No   Do you have housing? (Housing is defined as stable permanent housing and does not include staying ouside in a car, in a tent, in an abandoned building, in an overnight shelter, or couch-surfing.) Yes   Are you worried about losing your housing? No   Lack of transportation? No   Unable to get utilities (heat,electricity)? No            11/1/2024   Fall Risk   Fallen 2 or more times in the past year? No    Trouble with walking or balance? No        Patient-reported          11/1/2024   Activities of Daily Living- Home Safety   Needs help with the following daily activites None of the above   Safety concerns in the home None of the above            11/1/2024   Dental   Dentist two times every year? (!) NO             11/1/2024   Hearing Screening   Hearing concerns? None of the above            11/1/2024   Driving Risk Screening   Patient/family members have concerns about driving No            11/1/2024   General Alertness/Fatigue Screening   Have you been more tired than usual lately? (!) YES            11/1/2024   Urinary Incontinence Screening   Bothered by leaking urine in past 6 months Yes            11/1/2024   TB Screening   Were you born outside of the [place]? No            Today's PHQ-2 Score:       11/1/2024     8:20 AM   PHQ-2 ( 1999 [employer])   Q1: Little interest or pleasure in doing things 1    Q2: Feeling down, depressed or hopeless 1    PHQ-2 Score 2    Q1: Little interest or pleasure in doing things Several days   Q2: Feeling down, depressed or hopeless Several days   PHQ-2 Score 2       Patient-reported           11/1/2024   Substance Use   Alcohol more than 3/day or more than 7/wk Not Applicable   Do you have a current opioid prescription? No   How severe/bad is pain from 1 to 10? 2/10   Do you use any other substances recreationally? No        Social History     Tobacco Use    Smoking status: Never    Smokeless tobacco: Never   Vaping Use    Vaping status: Never Used   Substance Use Topics    Alcohol use: No     Alcohol/week: 0.0 standard drinks of alcohol    Drug use: Never           7/26/2024   LAST FHS-7 RESULTS   1st degree relative breast or ovarian cancer No   Any relative bilateral breast cancer No   Any male have breast cancer No   Any ONE woman have BOTH breast AND ovarian cancer No   Any woman with breast cancer before 50yrs No   2 or more relatives with breast AND/OR ovarian cancer No   2 or more relatives with breast AND/OR bowel cancer No           Mammogram Screening - After age 74- determine frequency with patient based on health status, life expectancy and patient goals    ASCVD Risk   The 10-year ASCVD risk score (Lisandro MCDOWELL, et al., 2019) is: 28%    Values used to calculate the  score:      Age: 78 years      Sex: Female      Is Non- : No      Diabetic: No      Tobacco smoker: No      Systolic Blood Pressure: 116 mmHg      Is BP treated: Yes      HDL Cholesterol: 74 mg/dL      Total Cholesterol: 153 mg/dL            Reviewed and updated as needed this visit by Provider                    Past Medical History:   Diagnosis Date    Chronic diastolic heart failure (H)     2014,ECHO 10/30/2013 - Final Impressions:  1. Mild left ventricular enlargement with a mild decrease in systolic function, estimated ejection fraction 45%.  2. Mild right ventricular enlargement with normal systolic function.  3. Mild left atrial enlargement.  4. Mild mitral regurgitation.  5. Trace tricuspid regurgitation.  6. Mild left ventricular diastolic dysfunction.  7. When compared t*    Chronic kidney disease, stage III (moderate) (H)     2014    Essential (primary) hypertension     No Comments Provided    Hyperlipidemia     No Comments Provided    Low back pain     Chronic low back pain secondary to injury, , with chronic pain syndrome.    Other cardiomyopathies (CODE)     2013,Diagnosed by angio and cath study 2013 at [facility]. EF of 35% at first study    Personal history of other medical treatment (CODE)      with all vaginal deliveries    Raynaud's syndrome without gangrene     No Comments Provided    Tinnitus     No Comments Provided    Ventricular premature depolarization     3/25/2013    Ventricular tachycardia (H)     3/4/2014     Past Surgical History:   Procedure Laterality Date    ARTHROSCOPY SHOULDER Right     Shoulder surgery for impingement    ARTHROSCOPY SHOULDER Left      Left shoulder surgery for impingement    ARTHROSCOPY SHOULDER  2009    and debridement, distal clavical excision    Cardiac ablation for pvc      not successful    COLONOSCOPY      06,Inflammation noted, no further issues    COLONOSCOPY  2016,wnl no need  f/u    DILATION AND CURETTAGE      D&C for retained placentas x2    FINGER SURGERY Left 10/1989    fourth finger, cyst removal    HYSTERECTOMY TOTAL ABDOMINAL, BILATERAL SALPINGO-OOPHORECTOMY, COMBINED      1991,Soren-Clifton procedure    MAMMOPLASTY AUGMENTATION      1983    REPAIR BLADDER      7/1999, with [facility] and Juan Miguel procedure with cystoscopy     BP Readings from Last 3 Encounters:   11/01/24 116/74   01/09/24 130/70   10/31/23 122/59    Wt Readings from Last 3 Encounters:   11/01/24 82.9 kg (182 lb 12.8 oz)   01/09/24 83.5 kg (184 lb)   10/31/23 84.8 kg (187 lb)                  Patient Active Problem List   Diagnosis    Arthralgia of right hip    At risk for amiodarone toxicity with long term use    CKD (chronic kidney disease) stage 3, GFR 30-59 ml/min (H)    Family history of ischemic heart disease    Mixed hyperlipidemia    Essential hypertension    Idiopathic cardiomyopathy (H)    Lumbago    PVC's (premature ventricular contractions)    Raynaud's disease    Arthropathy of shoulder region    Obesity    Raynaud's phenomenon (by history or observed)    RCT (rotator cuff tear)    Hypokalemia    Vasovagal syncope    Elevated troponin    Community acquired pneumonia of right lower lobe of lung    Abdominal aortic aneurysm (AAA) without rupture (H)    Chronic systolic congestive heart failure (H)    Lung nodule    History of cardiac cath on 1/25/2013-[facility]    Non-occlusive coronary artery disease    On amiodarone therapy     Past Surgical History:   Procedure Laterality Date    ARTHROSCOPY SHOULDER Right 1989    Shoulder surgery for impingement    ARTHROSCOPY SHOULDER Left 1988     Left shoulder surgery for impingement    ARTHROSCOPY SHOULDER  08/2009    and debridement, distal clavical excision    Cardiac ablation for pvc      not successful    COLONOSCOPY      4/24/06,Inflammation noted, no further issues    COLONOSCOPY  07/28/2016 7/28/2016,wnl no need f/u    DILATION AND CURETTAGE      D&C for  retained placentas x2    FINGER SURGERY Left 10/1989    fourth finger, cyst removal    HYSTERECTOMY TOTAL ABDOMINAL, BILATERAL SALPINGO-OOPHORECTOMY, COMBINED      1991,Soren-Clifton procedure    MAMMOPLASTY AUGMENTATION      1983    REPAIR BLADDER      7/1999, with [facility] and Juan Miguel procedure with cystoscopy       Social History     Tobacco Use    Smoking status: Never    Smokeless tobacco: Never   Substance Use Topics    Alcohol use: No     Alcohol/week: 0.0 standard drinks of alcohol     Family History   Problem Relation Age of Onset    Heart Disease Mother         Heart Disease,CHF    Hypertension Mother         Hypertension    Other - See Comments Mother         rheumatic fever    Other - See Comments Father         Old age    Breast Cancer Sister     Heart Disease Brother         Heart Disease,MI    Blood Disease No family hx of         Blood Disease,no clotting disorders         Current Outpatient Medications   Medication Sig Dispense Refill    ascorbic acid (VITAMIN C) 500 MG tablet Take 500 mg by mouth daily       aspirin 81 MG EC tablet Take 1 tablet (81 mg) by mouth daily 90 tablet 3    Calcium Carbonate-Vitamin D 500-125 MG-UNIT TABS Take 1 tablet by mouth 2 times daily       doxepin (SILENOR) 6 MG tablet TAKE 1 TABLET (6MG) BY MOUTH EVERY NIGHT AS NEEDEDFOR SLEEP 90 tablet 0    fish oil-omega-3 fatty acids 1000 MG capsule Take 2 g by mouth 2 times daily      furosemide (LASIX) 20 MG tablet Take 1 tablet (20 mg) by mouth daily 90 tablet 3    levothyroxine (SYNTHROID/LEVOTHROID) 25 MCG tablet TAKE 1 TABLET (25 MCG) BY MOUTH EVERY DAY 90 tablet 0    metoprolol succinate ER (TOPROL XL) 50 MG 24 hr tablet Take 1 tablet (50 mg) by mouth daily 90 tablet 3    Multiple Vitamin (MULTI-VITAMINS) TABS Take 1 tablet by mouth      potassium chloride ER (KLOR-CON) 20 MEQ CR tablet TAKE 1 TABLET (20 MEQ) BY MOUTH DAILY 90 tablet 3    rosuvastatin (CRESTOR) 40 MG tablet TAKE 1 TABLET BY MOUTH NIGHTLY AT BEDTIME 90 tablet  "3    spironolactone (ALDACTONE) 25 MG tablet Take 1 tablet (25 mg) by mouth daily 90 tablet 3    Tdap, tetanus-diptheria-acell pertussis, (BOOSTRIX) 5-2.5-18.5 LF-MCG/0.5 CALLIE injection Inject 0.5 mLs into the muscle once for 1 dose. 0.5 mL 0    UNABLE TO FIND MEDICATION NAME: OSTEO BIFLEX 1 tablet daily       Allergies   Allergen Reactions    Codeine GI Disturbance     \"gets sick\" per patient     Morphine Nausea     \"gets sick\" per patient     Sulfa Antibiotics Unknown     Other reaction(s): *Unknown - Childhood Rxn per her mother     Current providers sharing in care for this patient include:  Patient Care Team:  Roxana Maki MD as PCP - General (Family Practice)  Roxana Maki MD as Assigned PCP  Arturo Casas DPM as Assigned Musculoskeletal Provider  Jean Beltran,  as Assigned Heart and Vascular Provider    The following health maintenance items are reviewed in Epic and correct as of today:  Health Maintenance   Topic Date Due    BMP  07/29/2024    ALT  10/31/2024    LIPID  10/31/2024    MICROALBUMIN  10/31/2024    CBC  10/31/2024    DTAP/TDAP/TD IMMUNIZATION (2 - Td or Tdap) 10/30/2024    HEMOGLOBIN  10/31/2024    MEDICARE ANNUAL WELLNESS VISIT  11/01/2025    FALL RISK ASSESSMENT  11/01/2025    HF ACTION PLAN  10/31/2026    GLUCOSE  01/29/2027    ADVANCE CARE PLANNING  11/01/2028    DEXA  06/30/2035    TSH W/FREE T4 REFLEX  Completed    HEPATITIS C SCREENING  Completed    PHQ-2 (once per calendar year)  Completed    INFLUENZA VACCINE  Completed    Pneumococcal Vaccine: 65+ Years  Completed    URINALYSIS  Completed    ZOSTER IMMUNIZATION  Completed    RSV VACCINE  Completed    COVID-19 Vaccine  Completed    HPV IMMUNIZATION  Aged Out    MENINGITIS IMMUNIZATION  Aged Out    RSV MONOCLONAL ANTIBODY  Aged Out    MAMMO SCREENING  Discontinued    COLORECTAL CANCER SCREENING  Discontinued         Review of Systems  Constitutional, HEENT, cardiovascular, pulmonary, GI, , " "musculoskeletal, neuro, skin, endocrine and psych systems are negative, except as otherwise noted.     Objective    Exam  /74 (BP Location: Right arm, Patient Position: Sitting, Cuff Size: Adult Large)   Pulse 60   Temp 97  F (36.1  C) (Tympanic)   Resp 16   Ht 1.607 m (5' 3.25\")   Wt 82.9 kg (182 lb 12.8 oz)   LMP  (LMP Unknown)   SpO2 97%   BMI 32.13 kg/m     Estimated body mass index is 32.13 kg/m  as calculated from the following:    Height as of this encounter: 1.607 m (5' 3.25\").    Weight as of this encounter: 82.9 kg (182 lb 12.8 oz).    Physical Exam  Constitutional:       General: She is not in acute distress.     Appearance: She is well-developed.   HENT:      Head: Normocephalic.      Right Ear: Tympanic membrane and external ear normal.      Left Ear: Tympanic membrane and external ear normal.      Nose: Nose normal.      Mouth/Throat:      Mouth: Mucous membranes are moist.      Pharynx: Oropharynx is clear. No oropharyngeal exudate or posterior oropharyngeal erythema.   Eyes:      General:         Right eye: No discharge.         Left eye: No discharge.      Conjunctiva/sclera: Conjunctivae normal.      Pupils: Pupils are equal, round, and reactive to light.   Neck:      Thyroid: No thyromegaly.      Trachea: No tracheal deviation.   Cardiovascular:      Rate and Rhythm: Normal rate and regular rhythm.      Pulses: Normal pulses.      Heart sounds: Normal heart sounds, S1 normal and S2 normal. No murmur heard.     No friction rub. No gallop. No S3 or S4 sounds.   Pulmonary:      Effort: Pulmonary effort is normal. No respiratory distress.      Breath sounds: Normal breath sounds. No wheezing or rales.      Comments: Breast exam:  No masses palpable bilaterally.  No skin changes, tethering or axillary lymphadenopathy bilaterally.    Abdominal:      General: Bowel sounds are normal. There is no distension.      Palpations: Abdomen is soft. There is no mass.      Tenderness: There is no " abdominal tenderness.   Genitourinary:     Comments: Pelvic/Rectal exams deferred per patient.  Musculoskeletal:         General: Normal range of motion.      Cervical back: Neck supple.   Lymphadenopathy:      Cervical: No cervical adenopathy.   Skin:     General: Skin is warm and dry.      Findings: No rash.   Neurological:      Mental Status: She is alert and oriented to person, place, and time.      Motor: No abnormal muscle tone.      Deep Tendon Reflexes: Reflexes are normal and symmetric.   Psychiatric:         Mood and Affect: Mood normal.         Thought Content: Thought content normal.         Judgment: Judgment normal.               11/1/2024   Mini Cog   Clock Draw Score 2 Normal   3 Item Recall 3 objects recalled   Mini Cog Total Score 5                 ICD-10-CM    1. Encounter for Medicare annual wellness exam  Z00.00       2. Abdominal aortic aneurysm (AAA) without rupture, unspecified part (H)  I71.40  Aorta Medicare AAA Screening      3. Need for Tdap vaccination  Z23 Tdap, tetanus-diptheria-acell pertussis, (BOOSTRIX) 5-2.5-18.5 LF-MCG/0.5 CALLIE injection      4. Non-occlusive coronary artery disease  I25.10 Lipid Profile      5. Stage 3b chronic kidney disease (H)  N18.32 Comprehensive metabolic panel     Albumin Random Urine Quantitative with Creat Ratio      6. Chronic systolic congestive heart failure (H)  I50.22 CBC with Platelets      7. Hypothyroidism, unspecified type  E03.9 TSH with free T4 reflex           Mammogram last completed 7/26/24.  DEXA is up to date, last completed 6/30/2020 and was normal.  Colon cancer screening deferred due to age >75.  Pap Smear deferred due to age >65.  Covid, flu, Shingrix, prevnar/pneumovax, RSV vaccines are all up to date.  Order for Tdap sent to pharmacy.  Ultrasound to recheck abdominal aortic aneurysm ordered.    See #1.  Stable.  Follows with Cardiology.    Labs ordered as above.  Shortness of breath may be related to her chf vs her coronary artery  disease.  She does have an upcoming appointment with Cardiology.  She will follow up urgently if getting significantly worse.  TSH as above.  On Levothyroxine 25 mcg daily.    No follow-ups on file.           Roxana Maki MD

## 2024-11-01 NOTE — NURSING NOTE
Pt here for a Medicare Annual Wellness.  Sandra Christine CMA (AAMA)......................11/1/2024  8:32 AM       Medication Reconciliation: complete    Sandra Christine CMA  11/1/2024 8:32 AM

## 2024-11-29 ENCOUNTER — HOSPITAL ENCOUNTER (OUTPATIENT)
Dept: ULTRASOUND IMAGING | Facility: OTHER | Age: 79
Discharge: HOME | End: 2024-11-29
Attending: FAMILY MEDICINE | Admitting: FAMILY MEDICINE
Payer: COMMERCIAL

## 2024-11-29 DIAGNOSIS — I71.40 ABDOMINAL AORTIC ANEURYSM (AAA) WITHOUT RUPTURE, UNSPECIFIED PART (H): ICD-10-CM

## 2024-11-29 PROCEDURE — 76775 US EXAM ABDO BACK WALL LIM: CPT

## 2024-12-01 DIAGNOSIS — I50.22 CHRONIC SYSTOLIC CONGESTIVE HEART FAILURE (H): ICD-10-CM

## 2024-12-01 DIAGNOSIS — N18.31 STAGE 3A CHRONIC KIDNEY DISEASE (H): ICD-10-CM

## 2024-12-01 DIAGNOSIS — I42.9 IDIOPATHIC CARDIOMYOPATHY (H): ICD-10-CM

## 2024-12-01 DIAGNOSIS — I10 ESSENTIAL HYPERTENSION: ICD-10-CM

## 2024-12-01 DIAGNOSIS — E87.6 HYPOKALEMIA: ICD-10-CM

## 2024-12-02 RX ORDER — SPIRONOLACTONE 25 MG/1
25 TABLET ORAL DAILY
Qty: 90 TABLET | Refills: 3 | Status: SHIPPED | OUTPATIENT
Start: 2024-12-02

## 2024-12-02 NOTE — TELEPHONE ENCOUNTER
Requested Prescriptions   Pending Prescriptions Disp Refills    spironolactone (ALDACTONE) 25 MG tablet [Pharmacy Med Name: SPIRONOLACTONE 25MG TABLET] 90 tablet 3     Sig: TAKE 1 TABLET (25 MG) BY MOUTH DAILY       Diuretics (Including Combos) Protocol Failed - 12/2/2024  2:33 PM        Failed - Has GFR on file in past 12 months and most recent value is normal        Passed - Most recent blood pressure under 140/90 in past 12 months     BP Readings from Last 3 Encounters:   11/01/24 116/74   01/09/24 130/70   10/31/23 122/59   No data recorded        Passed - Medication is active on med list        Passed - Medication indicated for associated diagnosis     Medication is associated with one or more of the following diagnoses:     Hypertension   Heart Failure   Hyperaldosteronism   Acne   Hirsutism   Hypokalemia   Hepatic Cirrhosis   Nephrotic Syndrome   Myocardial Infarction   Transgender Female   Cardiomyopathy  Congenital Heart Disease  Diastolic Dysfunction        Passed - Recent (12 mo) or future (90 days) visit within the authorizing provider's specialty     The patient must have completed an in-person or virtual visit within the past 12 months or has a future visit scheduled within the next 90 days with the authorizing provider s specialty.  Urgent care and e-visits do not qualify as an office visit for this protocol.        Passed - Patient is age 18 or older        Passed - No active pregancy on record        Passed - No positive pregnancy test in past 12 months     Last Written Prescription Date:  1/9/24  Last Fill Quantity: 90,   # refills: 3  Last Office Visit: 1/9/24  Future Office visit:    Next 5 appointments (look out 90 days)      Jan 07, 2025 8:45 AM  (Arrive by 8:30 AM)  Return Visit with Jean Beltran,   [facility] [address]  [facility] [id number] [phone]     Routing refill request to provider for  review/approval because:  Abnormal lab    Unable to complete prescription refill per RN Medication Refill Policy.   Christy Miller RN ....................  12/2/2024   2:34 PM

## 2024-12-05 DIAGNOSIS — G47.00 INSOMNIA, UNSPECIFIED TYPE: ICD-10-CM

## 2024-12-05 RX ORDER — DOXEPIN 6 MG/1
TABLET, FILM COATED ORAL
Qty: 90 TABLET | Refills: 0 | Status: SHIPPED | OUTPATIENT
Start: 2024-12-05

## 2024-12-05 NOTE — TELEPHONE ENCOUNTER
DOXEPIN 6MG TABLET       Last Written Prescription Date:  9/4/24  Last Fill Quantity: 90,   # refills: 0  Last Office Visit: 11/1/24  Future Office visit:    Next 5 appointments (look out 90 days)      Jan 07, 2025 8:45 AM  (Arrive by 8:30 AM)  Return Visit with Jean Beltran,   [facility] ([facility]) [address]  [place] [id number] [phone]             Routing refill request to provider for review/approval because:  Drug not on the [facility], [facility] or [facility] refill protocol or controlled substance. Unable to complete prescription refill per RNMedication Refill Policy.................... Malu Chandler RN ....................  12/5/2024   4:28 PM

## 2025-01-06 NOTE — PROGRESS NOTES
[facility] HEART CARE   CARDIOLOGY PROGRESS NOTE     Chief Complaint   Patient presents with    Follow Up     annual          Diagnosis:   CHF-idiopathic.  -45-50% on 1/30/24.  -45-50% on 2/26/21.  -35-40% on 3/25/2013 cMRI.  2.  PVC's.   -7.8% on 6/10/2015.   -1.2% on 12/23/2016.   -PVC ablation  on 3/21/2013, [facility].    -H/O Multaq/amiodarone for PVC burden.  3.  Hypertension-controlled.  4.  Hyperlipidemia-controlled.  5.  CKD-3.  6.  CAD.   -Cardiac cath on 1/25/2013-[facility].    -Mild disease, no stent.  7.  AAA.   -3.2 cm on 11/29/2024.   -3.2 cm on 1/14/2023.         Assessment/Plan:    1.  CHF: Slightly reduced at 45-50% on 2/6/2021 and 1/30/2024.  Patient increasingly dyspneic for the last 2 months.  Currently on spironolactone, metoprolol, and Lasix.  Start Entresto low-dose 24/26 mg twice a day and Jardiance 10 mg daily.  Prescription was sent to [facility] pharmacy to see if she would qualify based on income for a lesser price.  Refilled her other heart failure medications and sent to [facility] here in [facility].  Overall other than dyspnea on exertion particular with walking up stairs, she is not having any significant issues.  She is not having significant swelling or edema.  Heart failure related issues.  At 1 point, her EF was as low as 35-40% on 3/25/2013 on cardiac MRI.  Is highly suspicious that her reduced EF was from a high PVC burden which she previously had an ablation. She was on Multaq/amiodarone for PVC burden.  However, her previous cardiac notes suggest her heart failure was idiopathic.  She had an ischemic workup.  This included a cardiac MRI showing fibrosis.  She had a cardiac catheterization showing mild blockages dating back to 1/25/2013.  Will plan for repeat echocardiogram related on dyspnea on exertion.  2.  PVC's: Has a history of high burden of PVC's.  Describes her PVC's as being rather symptomatic.  Had an ablation on 3/21/2013 through [facility] for PVC's.  They were not completely successful  as there were multiple areas some of which could not be ablated.  She was on Multaq and then switched to amiodarone.  She had been on amiodarone since 4/27/2015.  Amiodarone has since been discontinued.  However, she is aware that while on amiodarone she needs a chest x-ray, PFT's, and eye exam on yearly basis while on amiodarone.  Will try discontinue amiodarone.  If she has PVC burden in the future is particular symptomatic, may consider sotalol versus restarting amiodarone.  3.  Hypertension: Controlled.  Blood pressure today 128/72.  4.  Refill medication: Refill aspirin, Lasix, metoprolol, Crestor, and spironolactone.  5.  Labs in 2 weeks.  Because patient is starting Jardiance/Entresto.  Labs include magnesium, BMP, CBC, and BMP.  6.  AAA: Reviewed the ultrasound from 11/29/2024.  AAA unchanged in size at 3.3 cm. Plan for repeat ultrasound for AAA in 1 year.  7.  Follow-up in 1 year.  Plan for an ultrasound for AAA in 1 year.  Echo at next available appointment.  Start Entresto/Jardiance.  Refilled medications.  Labs in 2 weeks.    Interval history:  See above.      HPI:    Mrs. Shah is being seen by cardiology to establish care.  She has been following up with [facility].  She is being seen locally as it is closer.  She has a history of systolic heart failure and should PVC induced, PVC's on Multaq and then amiodarone which was stopped on 1/9/2024, and a small AAA at 3.2 cm.     She has a history of frequent PVC's on with tach/amiodarone was discontinued on 1/9/2024, hypokalemia on potassium replacement, hypertension-controlled, CKD-3, hyperlipidemia-controlled, CKD-3, nonocclusive coronary disease on cath from 1/25/2013 with less than 50% blockages, and concern for carotid artery stenosis.     Hailey has a long history of heart failure.  Her EF was as low as 35-40% on 3/25/2013.  It appears that her heart failure may have been PVC induced.  Records suggest idiopathic.  Most recently, her echocardiogram showed  an EF of 50% on 8/3/2022.  She was also noted to have wall motion abnormalities which has been an ongoing finding on previous imaging.  She had a cardiac MRI on 3/25/2013 through [facility].  The MRI showed an EF of 35-40%.  There was mild left ventricular enlargement, mild right ventricular dysfunction, with myocardial fibrosis/fatty replacement in the base to mid-apical anterior and mid myocardial walls.  She was noted to have mild mitral insufficiency.  Cardiac catheterization through [facility] on 1/25/2013 showed 30% proximal LAD, 40% proximal LCx, and 30% mid RCA.  No pulmonary embolus was noted.  She does not have a history of myocardial infarction, NSTEMI, or CABG.  She has not had any chest pain or chest tightness.  She has been mildly dyspneic on exertion walking up stairs or walking at a fast pace.  She has not had significant swelling.  She denied chest tightness or anginal symptoms.  She has been diagnosed with idiopathic cardiomyopathy.     She also has a history of significantly symptomatic PVC's.  He had an ablation on 3/7/2014 for PVC's.  Summary stated very difficult multifocal PVC ablation.  3 dominant epicardial PV sites of origin.  Not all could be completely eliminated but overall PVC burden was reduced.  Greater than 5 additional unrelated PVC's of less frequency observed.  Findings of the studies were consistent with primary nonischemic cardiomyopathy with subsequent multifocal PVC's related to myocardial fibrosis. The PVC ablation appeared to go well, however, subsequent Holter monitor on 3/20/2014 again demonstrated a suboptimal result. 24.2% of all beats were ventricular ectopic beats, totaling 25,076.  PVC ablation to see if it improves symptoms and improve systolic function.  Previously, was on dronedarone/Multaq on 4/10/2014.  Started on amiodarone 4/27/2015.  Has been on amiodarone until discontinued on 1/9/2024.  It was explained to her at time of consultation that she needs PFT's, chest  x-ray, and eye exam yearly on amiodarone.  She needs labs and EKG every 6 to 12 months.  She states she has not had these consistently.  Explained it was time to have these testing done.  Amiodarone was discontinued on 1/9/2024 to see how she does.  It is possible she will need to go back on the medication.     She also has a history of a AAA.  At time of consultation, it was measured 3.2 cm 11/14/2023.  Previously, on 8/26/2022 was 3.2 cm.  We did discuss this abnormality at the time of her consultation.         Relevant testing:  US for AAA, 11/29/2024:  Relatively stable 3.2 cm abdominal aortic aneurysm. For  aneurysms this size recommend ultrasound follow-up in 3 years.    US abdominal aorta on 11/14/2023:  Stable abdominal aortic aneurysm measuring up to 3.2 cm.      US abdominal aorta on 8/26/2022:  Small fusiform aneurysm of the distal abdominal aorta up to 3.2 cm.     Echo on 8/3/2022 at [facility]:   1. Normal LV size, borderline wall thickness, normal global systolic function with an estimated EF of 50%.    2. Mid lateral segment, basal lateral segment, and basal inferior segment are hypokinetic.    3. Right ventricular cavity size is normal, global systolic RV function is normal with a borderline increase in the RVSP estimate.    4. Mildly enlarged left atrium.    5. Mild mitral regurgitation.      Echo on 2/26/2021:  The Ejection Fraction is estimated at 45-50%.  Mild to Moderate left ventricular dilation is present.  Right ventricular function, chamber size, wall motion, and thickness are  normal.  The inferior vena cava is normal.  No pericardial effusion is present.  There is no prior study for direct comparison.     Echo on 8/4/2015:   1. Moderately increased left ventricular size, normal wall thickness, mildly reduced global systolic function, calculated EF of 47 %.   2. Inferior wall, posterior wall, mid lateral segment, and basal lateral segment are abnormal.   3. Mildly enlarged left atrium.   4. The  mitral valve is sclerotic, mild to moderate mitral regurgitation, eccentric jet directed posterolaterally. Etiology is like ischemic.   5. Compared to the previous echo images from 4/8/2015, the wall motion abnormalities, ejection fraction, and mitral regurgitation appear unchanged.     Ablation at [facility] on 3/7/2014:1.   Very difficult multifocal PVC ablation   ? 3 dominant epicardial PVC sites of origin.  Not all could be completely eliminated, but overall PVC burden was reduced.   ? >5 additional unrelated PVC sites of less frequency observed   ? Findings from the study are consistent with primary nonischemic cardiomyopathy with subsequent multifocal PVC's related to myocardial fibrosis      Cardiac MRI on 3/25/2013 at [facility]:  1. Idiopathic nonischemic cardiomyopathy.    A. Estimated ejection fraction 35-40% (ejection fraction is difficult to   assess because of very frequent ectopy and real time imaging).    B. Mild left ventricular enlargement.   2. Mild right ventricular dysfunction.   3. Diffuse myocardial fibrosis and fatty replacement in the base to   mid-apical anterior wall-mid myocardial.    A. Non-coronary in distribution.   4. Normal aortic valve.   5. Mild mitral insufficiency.   6. Normal thickening and abdominal aorta and branch vessels.   7. No pulmonary emboli by pulmonary MRA.      Cardiac cath on 1/25/2013 at [facility]:    The LMCA has mild luminal irregularities and is free of   significant disease.     30% stenosis in the Proximal LAD     40% stenosis in the Proximal Circumflex     30% stenosis in the Mid RCA.  Right dominant.     Left ventricular ejection fraction visually estimated at   35-40%. LV Pressure = 128/6.         ICD-10-CM    1. Chronic systolic congestive heart failure (H)  I50.22 Echocardiogram Complete     EKG 12-lead, tracing only     empagliflozin (JARDIANCE) 10 MG TABS tablet     sacubitril-valsartan (ENTRESTO) 24-26 MG per tablet     Basic metabolic panel     CBC with  platelets     N terminal pro BNP outpatient     Magnesium     furosemide (LASIX) 20 MG tablet     metoprolol succinate ER (TOPROL XL) 50 MG 24 hr tablet     spironolactone (ALDACTONE) 25 MG tablet      2. Idiopathic cardiomyopathy (H)  I42.9 Echocardiogram Complete     empagliflozin (JARDIANCE) 10 MG TABS tablet     sacubitril-valsartan (ENTRESTO) 24-26 MG per tablet     metoprolol succinate ER (TOPROL XL) 50 MG 24 hr tablet     spironolactone (ALDACTONE) 25 MG tablet      3. Non-occlusive coronary artery disease  I25.10 aspirin 81 MG EC tablet     rosuvastatin (CRESTOR) 40 MG tablet      4. Abdominal aortic aneurysm (AAA) without rupture, unspecified part (H)  I71.40 rosuvastatin (CRESTOR) 40 MG tablet     US Aorta Medicare AAA Screening      5. Stage 3a chronic kidney disease (H)  N18.31 empagliflozin (JARDIANCE) 10 MG TABS tablet     sacubitril-valsartan (ENTRESTO) 24-26 MG per tablet     Basic metabolic panel     CBC with platelets     N terminal pro BNP outpatient     Magnesium     spironolactone (ALDACTONE) 25 MG tablet      6. Mixed hyperlipidemia  E78.2 rosuvastatin (CRESTOR) 40 MG tablet      7. Essential hypertension  I10 metoprolol succinate ER (TOPROL XL) 50 MG 24 hr tablet     spironolactone (ALDACTONE) 25 MG tablet      8. On amiodarone therapy  Z79.899       9. History of cardiac cath on 1/25/2013-[facility]  Z98.890 aspirin 81 MG EC tablet     metoprolol succinate ER (TOPROL XL) 50 MG 24 hr tablet     rosuvastatin (CRESTOR) 40 MG tablet      10. Family history of ischemic heart disease  Z82.49 empagliflozin (JARDIANCE) 10 MG TABS tablet     sacubitril-valsartan (ENTRESTO) 24-26 MG per tablet     aspirin 81 MG EC tablet      11. Hypokalemia  E87.6 spironolactone (ALDACTONE) 25 MG tablet      12. History of cardiac cath on 1/25/2013-[facility]  Z98.890 aspirin 81 MG EC tablet     metoprolol succinate ER (TOPROL XL) 50 MG 24 hr tablet     rosuvastatin (CRESTOR) 40 MG tablet    Mild disease, no stent      13.  PVC's (premature ventricular contractions)  I49.3 metoprolol succinate ER (TOPROL XL) 50 MG 24 hr tablet          Past Medical History:   Diagnosis Date    Chronic diastolic heart failure (H)     2014,ECHO 10/30/2013 - Final Impressions:  1. Mild left ventricular enlargement with a mild decrease in systolic function, estimated ejection fraction 45%.  2. Mild right ventricular enlargement with normal systolic function.  3. Mild left atrial enlargement.  4. Mild mitral regurgitation.  5. Trace tricuspid regurgitation.  6. Mild left ventricular diastolic dysfunction.  7. When compared t*    Chronic kidney disease, stage III (moderate) (H)     2014    Essential (primary) hypertension     No Comments Provided    Hyperlipidemia     No Comments Provided    Low back pain     Chronic low back pain secondary to injury, , with chronic pain syndrome.    Other cardiomyopathies (CODE)     2013,Diagnosed by angio and cath study 2013 at [facility]. EF of 35% at first study    Personal history of other medical treatment (CODE)      with all vaginal deliveries    Raynaud's syndrome without gangrene     No Comments Provided    Tinnitus     No Comments Provided    Ventricular premature depolarization     3/25/2013    Ventricular tachycardia (H)     3/4/2014       Past Surgical History:   Procedure Laterality Date    ARTHROSCOPY SHOULDER Right     Shoulder surgery for impingement    ARTHROSCOPY SHOULDER Left      Left shoulder surgery for impingement    ARTHROSCOPY SHOULDER  2009    and debridement, distal clavical excision    Cardiac ablation for pvc      not successful    COLONOSCOPY      06,Inflammation noted, no further issues    COLONOSCOPY  2016,wnl no need f/u    DILATION AND CURETTAGE      D&C for retained placentas x2    FINGER SURGERY Left 10/1989    fourth finger, cyst removal    HYSTERECTOMY TOTAL ABDOMINAL, BILATERAL SALPINGO-OOPHORECTOMY, COMBINED       "1991,Soren-Clifton procedure    MAMMOPLASTY AUGMENTATION      1983    REPAIR BLADDER      7/1999, with [facility] and Juan Miguel procedure with cystoscopy       Allergies   Allergen Reactions    Codeine GI Disturbance     \"gets sick\" per patient     Morphine Nausea     \"gets sick\" per patient     Sulfa Antibiotics Unknown     Other reaction(s): *Unknown - Childhood Rxn per her mother       Current Outpatient Medications   Medication Sig Dispense Refill    aspirin 81 MG EC tablet Take 1 tablet (81 mg) by mouth daily. 90 tablet 3    empagliflozin (JARDIANCE) 10 MG TABS tablet Take 1 tablet (10 mg) by mouth daily. 90 tablet 3    furosemide (LASIX) 20 MG tablet Take 1 tablet (20 mg) by mouth daily. 90 tablet 3    metoprolol succinate ER (TOPROL XL) 50 MG 24 hr tablet Take 1 tablet (50 mg) by mouth daily. 90 tablet 3    rosuvastatin (CRESTOR) 40 MG tablet TAKE 1 TABLET BY MOUTH NIGHTLY AT BEDTIME 90 tablet 3    sacubitril-valsartan (ENTRESTO) 24-26 MG per tablet Take 1 tablet by mouth 2 times daily. 180 tablet 3    spironolactone (ALDACTONE) 25 MG tablet Take 1 tablet (25 mg) by mouth daily. 90 tablet 3    ascorbic acid (VITAMIN C) 500 MG tablet Take 500 mg by mouth daily       Calcium Carbonate-Vitamin D 500-125 MG-UNIT TABS Take 1 tablet by mouth 2 times daily       doxepin (SILENOR) 6 MG tablet TAKE 1 TABLET (6MG) BY MOUTH EVERY NIGHT AS NEEDEDFOR SLEEP 90 tablet 0    fish oil-omega-3 fatty acids 1000 MG capsule Take 2 g by mouth 2 times daily      levothyroxine (SYNTHROID/LEVOTHROID) 25 MCG tablet TAKE 1 TABLET (25 MCG) BY MOUTH EVERY DAY 90 tablet 0    Multiple Vitamin (MULTI-VITAMINS) TABS Take 1 tablet by mouth      UNABLE TO FIND MEDICATION NAME: OSTEO BIFLEX 1 tablet daily         Social History     Socioeconomic History    Marital status:      Spouse name: Not on file    Number of children: Not on file    Years of education: Not on file    Highest education level: Not on file   Occupational History    Not on file "   Tobacco Use    Smoking status: Never    Smokeless tobacco: Never   Vaping Use    Vaping status: Never Used   Substance and Sexual Activity    Alcohol use: No     Alcohol/week: 0.0 standard drinks of alcohol    Drug use: Never    Sexual activity: Yes     Partners: Male   Other Topics Concern    Not on file   Social History Narrative    . Gerardo - .  Retired from [employer] as a -retired 1/18/2012.  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    Children: Fariba Umana, 1963, SALINAS Waite, Shreya Esquivel, 1965, [place], 1967, [place], 1968, [place]     Social Drivers of Health     Financial Resource Strain: Low Risk  (11/1/2024)    Financial Resource Strain     Within the past 12 months, have you or your family members you live with been unable to get utilities (heat, electricity) when it was really needed?: No   Food Insecurity: Low Risk  (11/1/2024)    Food Insecurity     Within the past 12 months, did you worry that your food would run out before you got money to buy more?: No     Within the past 12 months, did the food you bought just not last and you didn t have money to get more?: No   Transportation Needs: Low Risk  (11/1/2024)    Transportation Needs     Within the past 12 months, has lack of transportation kept you from medical appointments, getting your medicines, non-medical meetings or appointments, work, or from getting things that you need?: No   Physical Activity: Insufficiently Active (11/1/2024)    Exercise Vital Sign     Days of Exercise per Week: 3 days     Minutes of Exercise per Session: 20 min   Stress: No Stress Concern Present (11/1/2024)    [employer] [place] [employer] - Occupational Stress Questionnaire     Feeling of Stress : Only a little   Social Connections: Unknown (11/1/2024)    Social Connection and Isolation Panel [NHANES]     Frequency of Communication with Friends and Family: Not on file     Frequency  of Social Gatherings with Friends and Family: Twice a week     Attends [employer] Services: Not on file     Active Member of Clubs or Organizations: Not on file     Attends Club or Organization Meetings: Not on file     Marital Status: Not on file   Interpersonal Safety: Low Risk  (1/7/2025)    Interpersonal Safety     Do you feel physically and emotionally safe where you currently live?: Yes     Within the past 12 months, have you been hit, slapped, kicked or otherwise physically hurt by someone?: No     Within the past 12 months, have you been humiliated or emotionally abused in other ways by your partner or ex-partner?: No   Housing Stability: Low Risk  (11/1/2024)    Housing Stability     Do you have housing? : Yes     Are you worried about losing your housing?: No       LAB RESULTS:   No visits with results within 2 Month(s) from this visit.   Latest known visit with results is:   Office Visit on 11/01/2024   Component Date Value Ref Range Status    Sodium 11/01/2024 139  135 - 145 mmol/L Final    Potassium 11/01/2024 4.2  3.4 - 5.3 mmol/L Final    Carbon Dioxide (CO2) 11/01/2024 27  22 - 29 mmol/L Final    Anion Gap 11/01/2024 10  7 - 15 mmol/L Final    Urea Nitrogen 11/01/2024 17.2  8.0 - 23.0 mg/dL Final    Creatinine 11/01/2024 1.22 (H)  0.51 - 0.95 mg/dL Final    GFR Estimate 11/01/2024 45 (L)  >60 mL/min/1.73m2 Final    Calcium 11/01/2024 9.6  8.8 - 10.4 mg/dL Final    Chloride 11/01/2024 102  98 - 107 mmol/L Final    Glucose 11/01/2024 92  70 - 99 mg/dL Final    Alkaline Phosphatase 11/01/2024 50  40 - 150 U/L Final    AST 11/01/2024 30  0 - 45 U/L Final    ALT 11/01/2024 22  0 - 50 U/L Final    Protein Total 11/01/2024 7.6  6.4 - 8.3 g/dL Final    Albumin 11/01/2024 4.5  3.5 - 5.2 g/dL Final    Bilirubin Total 11/01/2024 0.6  <=1.2 mg/dL Final    Patient Fasting > 8hrs? 11/01/2024 Unknown   Final    Cholesterol 11/01/2024 150  <200 mg/dL Final    Triglycerides 11/01/2024 88  <150 mg/dL Final    Direct  "Measure HDL 11/01/2024 71  >=50 mg/dL Final    LDL Cholesterol Calculated 11/01/2024 61  <100 mg/dL Final    Non HDL Cholesterol 11/01/2024 79  <130 mg/dL Final    Patient Fasting > 8hrs? 11/01/2024 Unknown   Final    Creatinine Urine mg/dL 11/01/2024 12.7  mg/dL Final    Albumin Urine mg/L 11/01/2024 <12.0  mg/L Final    Albumin Urine mg/g Cr 11/01/2024    Final    WBC Count 11/01/2024 6.2  4.0 - 11.0 10e3/uL Final    RBC Count 11/01/2024 4.45  3.80 - 5.20 10e6/uL Final    Hemoglobin 11/01/2024 13.8  11.7 - 15.7 g/dL Final    Hematocrit 11/01/2024 41.3  35.0 - 47.0 % Final    MCV 11/01/2024 93  78 - 100 fL Final    MCH 11/01/2024 31.0  26.5 - 33.0 pg Final    MCHC 11/01/2024 33.4  31.5 - 36.5 g/dL Final    RDW 11/01/2024 13.7  10.0 - 15.0 % Final    Platelet Count 11/01/2024 205  150 - 450 10e3/uL Final    TSH 11/01/2024 2.71  0.30 - 4.20 uIU/mL Final        Review of systems: Negative except that which was noted in the HPI.    Physical examination:  /72 (BP Location: Right arm, Patient Position: Sitting, Cuff Size: Adult Large)   Pulse 63   Temp 97.4  F (36.3  C) (Temporal)   Resp 16   Ht 1.6 m (5' 2.99\")   Wt 83 kg (183 lb)   LMP  (LMP Unknown)   SpO2 96%   BMI 32.43 kg/m      GENERAL APPEARANCE: healthy, alert and no distress  CHEST: lungs clear to auscultation - no rales, rhonchi or wheezes, no use of accessory muscles, no retractions, respirations are unlabored, normal respiratory rate  CARDIOVASCULAR: regular rhythm, normal S1 with physiologic split S2, no S3 or S4 and no murmur, click or rub  EXTREMITIES: no clubbing, cyanosis or edema            Thank you for allowing me to participate in the care of your patient. Please do not hesitate to contact me if you have any questions.     Jean Beltran, DO                "

## 2025-01-07 ENCOUNTER — OFFICE VISIT (OUTPATIENT)
Dept: CARDIOLOGY | Facility: OTHER | Age: 80
End: 2025-01-07
Attending: INTERNAL MEDICINE
Payer: COMMERCIAL

## 2025-01-07 VITALS
DIASTOLIC BLOOD PRESSURE: 72 MMHG | WEIGHT: 183 LBS | SYSTOLIC BLOOD PRESSURE: 128 MMHG | RESPIRATION RATE: 16 BRPM | OXYGEN SATURATION: 96 % | HEART RATE: 63 BPM | HEIGHT: 63 IN | TEMPERATURE: 97.4 F | BODY MASS INDEX: 32.43 KG/M2

## 2025-01-07 DIAGNOSIS — I42.9 IDIOPATHIC CARDIOMYOPATHY (H): ICD-10-CM

## 2025-01-07 DIAGNOSIS — I49.3 PVC'S (PREMATURE VENTRICULAR CONTRACTIONS): ICD-10-CM

## 2025-01-07 DIAGNOSIS — E87.6 HYPOKALEMIA: ICD-10-CM

## 2025-01-07 DIAGNOSIS — N18.31 STAGE 3A CHRONIC KIDNEY DISEASE (H): ICD-10-CM

## 2025-01-07 DIAGNOSIS — Z98.890 HISTORY OF CARDIAC CATH: ICD-10-CM

## 2025-01-07 DIAGNOSIS — Z79.899 ON AMIODARONE THERAPY: ICD-10-CM

## 2025-01-07 DIAGNOSIS — Z82.49 FAMILY HISTORY OF ISCHEMIC HEART DISEASE: ICD-10-CM

## 2025-01-07 DIAGNOSIS — E78.2 MIXED HYPERLIPIDEMIA: ICD-10-CM

## 2025-01-07 DIAGNOSIS — I10 ESSENTIAL HYPERTENSION: ICD-10-CM

## 2025-01-07 DIAGNOSIS — I25.10 NON-OCCLUSIVE CORONARY ARTERY DISEASE: ICD-10-CM

## 2025-01-07 DIAGNOSIS — I50.22 CHRONIC SYSTOLIC CONGESTIVE HEART FAILURE (H): Primary | ICD-10-CM

## 2025-01-07 DIAGNOSIS — I71.40 ABDOMINAL AORTIC ANEURYSM (AAA) WITHOUT RUPTURE, UNSPECIFIED PART: ICD-10-CM

## 2025-01-07 LAB
ATRIAL RATE - MUSE: 58 BPM
DIASTOLIC BLOOD PRESSURE - MUSE: NORMAL MMHG
INTERPRETATION ECG - MUSE: NORMAL
P AXIS - MUSE: 59 DEGREES
PR INTERVAL - MUSE: 176 MS
QRS DURATION - MUSE: 110 MS
QT - MUSE: 416 MS
QTC - MUSE: 408 MS
R AXIS - MUSE: 56 DEGREES
SYSTOLIC BLOOD PRESSURE - MUSE: NORMAL MMHG
T AXIS - MUSE: 141 DEGREES
VENTRICULAR RATE- MUSE: 58 BPM

## 2025-01-07 PROCEDURE — 93005 ELECTROCARDIOGRAM TRACING: CPT | Performed by: INTERNAL MEDICINE

## 2025-01-07 PROCEDURE — G0463 HOSPITAL OUTPT CLINIC VISIT: HCPCS

## 2025-01-07 RX ORDER — ROSUVASTATIN CALCIUM 40 MG/1
TABLET, COATED ORAL
Qty: 90 TABLET | Refills: 3 | Status: SHIPPED | OUTPATIENT
Start: 2025-01-07

## 2025-01-07 RX ORDER — FUROSEMIDE 20 MG/1
20 TABLET ORAL DAILY
Qty: 90 TABLET | Refills: 3 | Status: SHIPPED | OUTPATIENT
Start: 2025-01-07

## 2025-01-07 RX ORDER — METOPROLOL SUCCINATE 50 MG/1
50 TABLET, EXTENDED RELEASE ORAL DAILY
Qty: 90 TABLET | Refills: 3 | Status: SHIPPED | OUTPATIENT
Start: 2025-01-07

## 2025-01-07 RX ORDER — SPIRONOLACTONE 25 MG/1
25 TABLET ORAL DAILY
Qty: 90 TABLET | Refills: 3 | Status: SHIPPED | OUTPATIENT
Start: 2025-01-07

## 2025-01-07 RX ORDER — SACUBITRIL AND VALSARTAN 24; 26 MG/1; MG/1
1 TABLET, FILM COATED ORAL 2 TIMES DAILY
Qty: 180 TABLET | Refills: 3 | Status: SHIPPED | OUTPATIENT
Start: 2025-01-07

## 2025-01-07 RX ORDER — ASPIRIN 81 MG/1
81 TABLET ORAL DAILY
Qty: 90 TABLET | Refills: 3 | Status: SHIPPED | OUTPATIENT
Start: 2025-01-07

## 2025-01-07 ASSESSMENT — PAIN SCALES - GENERAL: PAINLEVEL_OUTOF10: NO PAIN (0)

## 2025-01-07 NOTE — NURSING NOTE
"Chief Complaint   Patient presents with    Follow Up     annual       Initial /72 (BP Location: Right arm, Patient Position: Sitting, Cuff Size: Adult Large)   Pulse 63   Temp 97.4  F (36.3  C) (Temporal)   Resp 16   Ht 1.6 m (5' 2.99\")   Wt 83 kg (183 lb)   LMP  (LMP Unknown)   SpO2 96%   BMI 32.43 kg/m   Estimated body mass index is 32.43 kg/m  as calculated from the following:    Height as of this encounter: 1.6 m (5' 2.99\").    Weight as of this encounter: 83 kg (183 lb).  Meds Reconciled: complete  Pt is on Aspirin  Pt is on a Statin  PHQ and/or SANGEETA reviewed. Pt referred to PCP/MH Provider as appropriate.    Viviane Tang LPN      "

## 2025-01-09 ENCOUNTER — NURSE TRIAGE (OUTPATIENT)
Dept: NURSING | Facility: CLINIC | Age: 80
End: 2025-01-09
Payer: COMMERCIAL

## 2025-01-09 ENCOUNTER — HOSPITAL ENCOUNTER (EMERGENCY)
Facility: OTHER | Age: 80
Discharge: HOME | End: 2025-01-09
Attending: FAMILY MEDICINE
Payer: COMMERCIAL

## 2025-01-09 ENCOUNTER — APPOINTMENT (OUTPATIENT)
Dept: GENERAL RADIOLOGY | Facility: OTHER | Age: 80
End: 2025-01-09
Attending: FAMILY MEDICINE
Payer: COMMERCIAL

## 2025-01-09 VITALS
OXYGEN SATURATION: 95 % | HEART RATE: 61 BPM | RESPIRATION RATE: 30 BRPM | DIASTOLIC BLOOD PRESSURE: 56 MMHG | SYSTOLIC BLOOD PRESSURE: 112 MMHG

## 2025-01-09 DIAGNOSIS — R07.89 CHEST TIGHTNESS: ICD-10-CM

## 2025-01-09 LAB
ANION GAP SERPL CALCULATED.3IONS-SCNC: 13 MMOL/L (ref 7–15)
ATRIAL RATE - MUSE: 66 BPM
BASOPHILS # BLD AUTO: 0 10E3/UL (ref 0–0.2)
BASOPHILS NFR BLD AUTO: 1 %
BUN SERPL-MCNC: 16.1 MG/DL (ref 8–23)
CALCIUM SERPL-MCNC: 9.1 MG/DL (ref 8.8–10.4)
CHLORIDE SERPL-SCNC: 104 MMOL/L (ref 98–107)
CREAT SERPL-MCNC: 1.23 MG/DL (ref 0.51–0.95)
DIASTOLIC BLOOD PRESSURE - MUSE: NORMAL MMHG
EGFRCR SERPLBLD CKD-EPI 2021: 44 ML/MIN/1.73M2
EOSINOPHIL # BLD AUTO: 0.2 10E3/UL (ref 0–0.7)
EOSINOPHIL NFR BLD AUTO: 3 %
ERYTHROCYTE [DISTWIDTH] IN BLOOD BY AUTOMATED COUNT: 13.7 % (ref 10–15)
FLUAV RNA SPEC QL NAA+PROBE: NEGATIVE
FLUBV RNA RESP QL NAA+PROBE: NEGATIVE
GLUCOSE SERPL-MCNC: 110 MG/DL (ref 70–99)
HCO3 SERPL-SCNC: 24 MMOL/L (ref 22–29)
HCT VFR BLD AUTO: 38.8 % (ref 35–47)
HGB BLD-MCNC: 13.3 G/DL (ref 11.7–15.7)
HOLD SPECIMEN: NORMAL
HOLD SPECIMEN: NORMAL
IMM GRANULOCYTES # BLD: 0 10E3/UL
IMM GRANULOCYTES NFR BLD: 0 %
INTERPRETATION ECG - MUSE: NORMAL
LYMPHOCYTES # BLD AUTO: 2.3 10E3/UL (ref 0.8–5.3)
LYMPHOCYTES NFR BLD AUTO: 37 %
MCH RBC QN AUTO: 30.6 PG (ref 26.5–33)
MCHC RBC AUTO-ENTMCNC: 34.3 G/DL (ref 31.5–36.5)
MCV RBC AUTO: 89 FL (ref 78–100)
MONOCYTES # BLD AUTO: 0.5 10E3/UL (ref 0–1.3)
MONOCYTES NFR BLD AUTO: 8 %
NEUTROPHILS # BLD AUTO: 3.2 10E3/UL (ref 1.6–8.3)
NEUTROPHILS NFR BLD AUTO: 51 %
NRBC # BLD AUTO: 0 10E3/UL
NRBC BLD AUTO-RTO: 0 /100
P AXIS - MUSE: 15 DEGREES
PLATELET # BLD AUTO: 213 10E3/UL (ref 150–450)
POTASSIUM SERPL-SCNC: 3.8 MMOL/L (ref 3.4–5.3)
PR INTERVAL - MUSE: 172 MS
QRS DURATION - MUSE: 110 MS
QT - MUSE: 406 MS
QTC - MUSE: 425 MS
R AXIS - MUSE: 52 DEGREES
RBC # BLD AUTO: 4.34 10E6/UL (ref 3.8–5.2)
RSV RNA SPEC NAA+PROBE: NEGATIVE
SARS-COV-2 RNA RESP QL NAA+PROBE: NEGATIVE
SODIUM SERPL-SCNC: 141 MMOL/L (ref 135–145)
SYSTOLIC BLOOD PRESSURE - MUSE: NORMAL MMHG
T AXIS - MUSE: 19 DEGREES
TROPONIN T SERPL HS-MCNC: 14 NG/L
TROPONIN T SERPL HS-MCNC: 15 NG/L
VENTRICULAR RATE- MUSE: 66 BPM
WBC # BLD AUTO: 6.2 10E3/UL (ref 4–11)

## 2025-01-09 PROCEDURE — 99284 EMERGENCY DEPT VISIT MOD MDM: CPT | Performed by: FAMILY MEDICINE

## 2025-01-09 PROCEDURE — 85014 HEMATOCRIT: CPT | Performed by: FAMILY MEDICINE

## 2025-01-09 PROCEDURE — 84484 ASSAY OF TROPONIN QUANT: CPT | Performed by: FAMILY MEDICINE

## 2025-01-09 PROCEDURE — 71045 X-RAY EXAM CHEST 1 VIEW: CPT

## 2025-01-09 PROCEDURE — 36415 COLL VENOUS BLD VENIPUNCTURE: CPT | Performed by: FAMILY MEDICINE

## 2025-01-09 PROCEDURE — 93005 ELECTROCARDIOGRAM TRACING: CPT

## 2025-01-09 PROCEDURE — 85004 AUTOMATED DIFF WBC COUNT: CPT | Performed by: FAMILY MEDICINE

## 2025-01-09 PROCEDURE — 82565 ASSAY OF CREATININE: CPT | Performed by: FAMILY MEDICINE

## 2025-01-09 PROCEDURE — 80048 BASIC METABOLIC PNL TOTAL CA: CPT | Performed by: FAMILY MEDICINE

## 2025-01-09 PROCEDURE — 93010 ELECTROCARDIOGRAM REPORT: CPT | Performed by: INTERNAL MEDICINE

## 2025-01-09 PROCEDURE — 85041 AUTOMATED RBC COUNT: CPT | Performed by: FAMILY MEDICINE

## 2025-01-09 PROCEDURE — 99285 EMERGENCY DEPT VISIT HI MDM: CPT | Mod: 25

## 2025-01-09 PROCEDURE — 87637 SARSCOV2&INF A&B&RSV AMP PRB: CPT | Performed by: FAMILY MEDICINE

## 2025-01-09 ASSESSMENT — COLUMBIA-SUICIDE SEVERITY RATING SCALE - C-SSRS
2. HAVE YOU ACTUALLY HAD ANY THOUGHTS OF KILLING YOURSELF IN THE PAST MONTH?: NO
6. HAVE YOU EVER DONE ANYTHING, STARTED TO DO ANYTHING, OR PREPARED TO DO ANYTHING TO END YOUR LIFE?: NO
1. IN THE PAST MONTH, HAVE YOU WISHED YOU WERE DEAD OR WISHED YOU COULD GO TO SLEEP AND NOT WAKE UP?: NO

## 2025-01-09 ASSESSMENT — ACTIVITIES OF DAILY LIVING (ADL)
ADLS_ACUITY_SCORE: 41

## 2025-01-09 ASSESSMENT — ENCOUNTER SYMPTOMS
CHEST TIGHTNESS: 1
SHORTNESS OF BREATH: 1

## 2025-01-09 NOTE — ED PROVIDER NOTES
"  History     Chief Complaint   Patient presents with    Chest Pain     Here with her daughter    The history is provided by the patient and a relative.     Hailey Shah is a 79 year old female who woke up at 11 PM with chest tightness on the left side of her chest. She felt like it was gas. By 1 AM it was worse, rated 3 or 4 of 10. She was up walking around the past five hours and finally checked her BP, which was 99/69. She had some SOB and felt dizzy. No cough, no N/V. She did feel hot at times but had no chills.    No history of this. She just started Entresto, a new heart medication for her, yesterday after she saw Dr Beltran in clinic. She has sCHF with dyspnea, HTN, CKD 3, CAD (cath 2013, mild disease, no stent), AAA (3.2 cm Nov 2024).    Allergies:  Allergies   Allergen Reactions    Codeine GI Disturbance     \"gets sick\" per patient     Morphine Nausea     \"gets sick\" per patient     Sulfa Antibiotics Unknown     Other reaction(s): *Unknown - Childhood Rxn per her mother       Problem List:    Patient Active Problem List    Diagnosis Date Noted    History of cardiac cath on 1/25/2013-[facility] 01/09/2024     Priority: Medium     Mild disease, no stent      Non-occlusive coronary artery disease 01/09/2024     Priority: Medium    On amiodarone therapy 01/09/2024     Priority: Medium    Abdominal aortic aneurysm (AAA) without rupture 08/30/2022     Priority: Medium    Chronic systolic congestive heart failure (H) 03/02/2021     Priority: Medium    Lung nodule 03/02/2021     Priority: Medium    Hypokalemia 11/17/2020     Priority: Medium    Vasovagal syncope 11/17/2020     Priority: Medium    Elevated troponin 11/17/2020     Priority: Medium    Community acquired pneumonia of right lower lobe of lung 11/17/2020     Priority: Medium    Family history of ischemic heart disease 01/16/2018     Priority: Medium     Overview:   Brother had fatal myocardial infarction at age 60.      Lumbago 01/16/2018     Priority: " Medium     Overview:   Chronic low back pain secondary to injury, 1995, with chronic pain syndrome.      Mixed hyperlipidemia 08/28/2017     Priority: Medium     Overview:   IMO Update 10/11      Essential hypertension 08/28/2017     Priority: Medium     Overview:   IMO Update      PVC's (premature ventricular contractions) 12/14/2016     Priority: Medium    At risk for amiodarone toxicity with long term use 06/09/2016     Priority: Medium    Arthralgia of right hip 04/26/2016     Priority: Medium    Idiopathic cardiomyopathy (H) 09/24/2014     Priority: Medium    CKD (chronic kidney disease) stage 3, GFR 30-59 ml/min (H) 02/24/2014     Priority: Medium    Raynaud's disease 01/25/2012     Priority: Medium    Arthropathy of shoulder region 05/28/2010     Priority: Medium     Overview:   IMO Update 10/11      RCT (rotator cuff tear) 05/28/2010     Priority: Medium     Overview:   IMO Update 10/11      Obesity 11/12/2008     Priority: Medium     Overview:   Updated per 10/1/17 IMO import      Raynaud's phenomenon (by history or observed) 11/12/2008     Priority: Medium     Overview:   IMO Update 10/11          Past Medical History:    Past Medical History:   Diagnosis Date    Chronic diastolic heart failure (H)     Chronic kidney disease, stage III (moderate) (H)     Essential (primary) hypertension     Hyperlipidemia     Low back pain     Other cardiomyopathies (CODE)     Personal history of other medical treatment (CODE)     Raynaud's syndrome without gangrene     Tinnitus     Ventricular premature depolarization     Ventricular tachycardia (H)        Past Surgical History:    Past Surgical History:   Procedure Laterality Date    ARTHROSCOPY SHOULDER Right 1989    Shoulder surgery for impingement    ARTHROSCOPY SHOULDER Left 1988     Left shoulder surgery for impingement    ARTHROSCOPY SHOULDER  08/2009    and debridement, distal clavical excision    Cardiac ablation for pvc      not successful    COLONOSCOPY       4/24/06,Inflammation noted, no further issues    COLONOSCOPY  07/28/2016 7/28/2016,wnl no need f/u    DILATION AND CURETTAGE      D&C for retained placentas x2    FINGER SURGERY Left 10/1989    fourth finger, cyst removal    HYSTERECTOMY TOTAL ABDOMINAL, BILATERAL SALPINGO-OOPHORECTOMY, COMBINED      1991,Soren-Clifton procedure    MAMMOPLASTY AUGMENTATION      1983    REPAIR BLADDER      7/1999, with [facility] and Juan Miguel procedure with cystoscopy       Family History:    Family History   Problem Relation Age of Onset    Heart Disease Mother         Heart Disease,CHF    Hypertension Mother         Hypertension    Other - See Comments Mother         rheumatic fever    Other - See Comments Father         Old age    Breast Cancer Sister     Heart Disease Brother         Heart Disease,MI    Blood Disease No family hx of         Blood Disease,no clotting disorders       Social History:  Marital Status:   [2]  Social History     Tobacco Use    Smoking status: Never    Smokeless tobacco: Never   Vaping Use    Vaping status: Never Used   Substance Use Topics    Alcohol use: No     Alcohol/week: 0.0 standard drinks of alcohol    Drug use: Never        Medications:    ascorbic acid (VITAMIN C) 500 MG tablet  aspirin 81 MG EC tablet  Calcium Carbonate-Vitamin D 500-125 MG-UNIT TABS  doxepin (SILENOR) 6 MG tablet  empagliflozin (JARDIANCE) 10 MG TABS tablet  fish oil-omega-3 fatty acids 1000 MG capsule  furosemide (LASIX) 20 MG tablet  levothyroxine (SYNTHROID/LEVOTHROID) 25 MCG tablet  metoprolol succinate ER (TOPROL XL) 50 MG 24 hr tablet  Multiple Vitamin (MULTI-VITAMINS) TABS  rosuvastatin (CRESTOR) 40 MG tablet  sacubitril-valsartan (ENTRESTO) 24-26 MG per tablet  spironolactone (ALDACTONE) 25 MG tablet  UNABLE TO FIND          Review of Systems   Respiratory:  Positive for chest tightness and shortness of breath.    Cardiovascular:  Positive for chest pain.   All other systems reviewed and are  negative.      Physical Exam   BP: 134/67  Pulse: 67  Resp: 18  SpO2: 97 %      Physical Exam  Vitals and nursing note reviewed.   Constitutional:       General: She is not in acute distress.     Appearance: She is not ill-appearing.   Cardiovascular:      Rate and Rhythm: Normal rate and regular rhythm.      Heart sounds: Normal heart sounds.   Pulmonary:      Effort: Pulmonary effort is normal. No respiratory distress.      Breath sounds: Normal breath sounds.   Musculoskeletal:      Right lower leg: No tenderness. No edema.      Left lower leg: No tenderness. No edema.   Skin:     General: Skin is warm and dry.   Neurological:      General: No focal deficit present.      Mental Status: She is alert and oriented to person, place, and time.         EKG: Sinus rhythm, occasional PVC's, rate 66, normal axis- no change from January 7, 2024    Results for orders placed or performed during the hospital encounter of 01/09/25 (from the past 24 hours)   CBC with platelets differential    Narrative    The following orders were created for panel order CBC with platelets differential.  Procedure                               Abnormality         Status                     ---------                               -----------         ------                     CBC with platelets and d...[[id number]]                      Final result                 Please view results for these tests on the individual orders.   Basic metabolic panel   Result Value Ref Range    Sodium 141 135 - 145 mmol/L    Potassium 3.8 3.4 - 5.3 mmol/L    Chloride 104 98 - 107 mmol/L    Carbon Dioxide (CO2) 24 22 - 29 mmol/L    Anion Gap 13 7 - 15 mmol/L    Urea Nitrogen 16.1 8.0 - 23.0 mg/dL    Creatinine 1.23 (H) 0.51 - 0.95 mg/dL    GFR Estimate 44 (L) >60 mL/min/1.73m2    Calcium 9.1 8.8 - 10.4 mg/dL    Glucose 110 (H) 70 - 99 mg/dL   Troponin T, High Sensitivity   Result Value Ref Range    Troponin T, High Sensitivity 15 (H) <=14 ng/L   CBC with platelets  and differential   Result Value Ref Range    WBC Count 6.2 4.0 - 11.0 10e3/uL    RBC Count 4.34 3.80 - 5.20 10e6/uL    Hemoglobin 13.3 11.7 - 15.7 g/dL    Hematocrit 38.8 35.0 - 47.0 %    MCV 89 78 - 100 fL    MCH 30.6 26.5 - 33.0 pg    MCHC 34.3 31.5 - 36.5 g/dL    RDW 13.7 10.0 - 15.0 %    Platelet Count 213 150 - 450 10e3/uL    % Neutrophils 51 %    % Lymphocytes 37 %    % Monocytes 8 %    % Eosinophils 3 %    % Basophils 1 %    % Immature Granulocytes 0 %    NRBCs per 100 WBC 0 <1 /100    Absolute Neutrophils 3.2 1.6 - 8.3 10e3/uL    Absolute Lymphocytes 2.3 0.8 - 5.3 10e3/uL    Absolute Monocytes 0.5 0.0 - 1.3 10e3/uL    Absolute Eosinophils 0.2 0.0 - 0.7 10e3/uL    Absolute Basophils 0.0 0.0 - 0.2 10e3/uL    Absolute Immature Granulocytes 0.0 <=0.4 10e3/uL    Absolute NRBCs 0.0 10e3/uL   Extra Tube    Narrative    The following orders were created for panel order Extra Tube.  Procedure                               Abnormality         Status                     ---------                               -----------         ------                     Extra Blue Top Tube[[id number]]                              In process                 Extra Red Top Tube[[id number]]                               In process                   Please view results for these tests on the individual orders.   XR Chest Port 1 View    Narrative    EXAM: XR CHEST PORT 1 VIEW  LOCATION: [facility]  DATE: 1/9/2025    INDICATION: chest pain  COMPARISON: 01/09/2024      Impression    IMPRESSION: Stable cardiomediastinal silhouette. 1.4 cm right basilar nodule unchanged. Atherosclerotic aorta. Remote left rib fracture. No focal consolidation or pleural effusion.       Medications - No data to display    Assessments & Plan (with Medical Decision Making)  Hailey Shah is a 79 year old female who woke up at 11 PM with chest tightness on the left side of her chest. She felt like it was gas. By 1 AM it was worse, rated 3 or 4  of 10. She was up walking around the past five hours and finally checked her BP, which was 99/69. She had some SOB and felt dizzy. No cough, no N/V. She did feel hot at times but had no chills.  No history of this. She just started Entresto, a new heart medication for her, yesterday after she saw Dr Beltran in clinic. She has sCHF with dyspnea, HTN, CKD 3, CAD (cath 2013, mild disease, no stent), AAA (3.2 cm Nov 2024).  VS in the ED /69   Pulse 64   Resp 14   LMP  (LMP Unknown)   SpO2 94%   Exam shows no concerns.   EKG stable.  Labs show CBC normal, BMP stable, troponin 15, 4 Plex pending.  Chest xray stable.  7 AM  I am signing out her care to Dr Tan at change of shift.      I have reviewed the nursing notes.    I have reviewed the findings, diagnosis, plan and need for follow up with the patient.  Medical Decision Making  The patient's presentation was of moderate complexity (an undiagnosed new problem with uncertain prognosis).    The patient's evaluation involved:  an assessment requiring an independent historian (see separate area of note for details)  ordering and/or review of 3+ test(s) in this encounter (see separate area of note for details)    The patient's management necessitated further care after sign-out to Dr Tan (see their note for further management).        Final diagnoses:   Chest tightness       1/9/2025   [facility] AND [facility], Dominic Mesa MD  01/09/25 0629

## 2025-01-09 NOTE — ED PROVIDER NOTES
[facility]  Emergency Department Sign Out Note      Transfer of care from Dr. Miller. See separate Emergency Department note.    Assessment and Plan:  The patient was evaluated by the previous provider for Chest Pain.     ED Course as of 01/09/25 0925   Thu Jan 09, 2025   0724 Influenza B: Negative   0724 Resp Syncytial Virus: Negative   0724 SARS CoV2 PCR: Negative   0924 Stop her Entresto.  I have let  Know this through messaging.  Can continue taking her Jardiance on the day he recommended   0925 Troponin T, High Sensitivity: 14       Diagnosis  1. Chest tightness        Disposition:  Home       Radha Tan MD  01/09/25 0925

## 2025-01-09 NOTE — ED TRIAGE NOTES
Pt arrives to ER via private vehicle with c/o L chest pain. Pt reports it feels like pressure at this time. Pt reports that the pain started around 2300 last night. Pt reports that around 0100 it started getting worse. Pt denies taking anything for the pain.

## 2025-01-09 NOTE — DISCHARGE INSTRUCTIONS
Discontinue your Entresto. I will message Dr Beltran and let him know about your ED visit. You can start your Jardiance at the date he recommended

## 2025-01-09 NOTE — TELEPHONE ENCOUNTER
"Patient has been up all night with discomfort in her chest. The patient is on a new medication and is wondering if that is the reason. Blood pressure is 99/59 which is low for her. Typical is 120s/70s.  Denies severe difficulty breathing but states it feels a little tight. Patient is speaking at ease on the phone. The \"discomfort\" is felt in her left chest and left arm.   Protocol recommends 911 for EMS  Patient asking if her daughter can drive her. Daughter lives with her and she just needs to wake her up. Informed the concern is that she may be having a cardiac event and the recommendation is for 911 so she can be treated en route by EMS. Patient states she will ask her  if she can call 911.   Samantha Benton RN   01/09/25 5:27 AM  [facility] Nurse Advisor    Reason for Disposition   Chest pain lasting longer than 5 minutes and ANY of the following:    history of heart disease  (i.e., heart attack, bypass surgery, angina, angioplasty, CHF; not just a heart murmur)    described as crushing, pressure-like, or heavy    age > 44    age > 30 AND at least one cardiac risk factor (i.e., hypertension, diabetes, obesity, smoker or strong family history of heart disease)    not relieved with nitroglycerin    Additional Information   Negative: SEVERE difficulty breathing (e.g., struggling for each breath, speaks in single words)   Negative: Difficult to awaken or acting confused (e.g., disoriented, slurred speech)   Negative: Shock suspected (e.g., cold/pale/clammy skin, too weak to stand, low BP, rapid pulse)   Negative: Passed out (i.e., lost consciousness, collapsed and was not responding)    Protocols used: Chest Pain-A-AH    "

## 2025-01-21 ENCOUNTER — LAB (OUTPATIENT)
Dept: LAB | Facility: OTHER | Age: 80
End: 2025-01-21
Attending: INTERNAL MEDICINE
Payer: COMMERCIAL

## 2025-01-21 DIAGNOSIS — N18.31 STAGE 3A CHRONIC KIDNEY DISEASE (H): ICD-10-CM

## 2025-01-21 DIAGNOSIS — I50.22 CHRONIC SYSTOLIC CONGESTIVE HEART FAILURE (H): ICD-10-CM

## 2025-01-21 LAB
ANION GAP SERPL CALCULATED.3IONS-SCNC: 14 MMOL/L (ref 7–15)
BUN SERPL-MCNC: 20 MG/DL (ref 8–23)
CALCIUM SERPL-MCNC: 10.3 MG/DL (ref 8.8–10.4)
CHLORIDE SERPL-SCNC: 102 MMOL/L (ref 98–107)
CREAT SERPL-MCNC: 1.42 MG/DL (ref 0.51–0.95)
EGFRCR SERPLBLD CKD-EPI 2021: 37 ML/MIN/1.73M2
ERYTHROCYTE [DISTWIDTH] IN BLOOD BY AUTOMATED COUNT: 13.7 % (ref 10–15)
GLUCOSE SERPL-MCNC: 93 MG/DL (ref 70–99)
HCO3 SERPL-SCNC: 28 MMOL/L (ref 22–29)
HCT VFR BLD AUTO: 40.6 % (ref 35–47)
HGB BLD-MCNC: 13.7 G/DL (ref 11.7–15.7)
MAGNESIUM SERPL-MCNC: 2.2 MG/DL (ref 1.7–2.3)
MCH RBC QN AUTO: 30.8 PG (ref 26.5–33)
MCHC RBC AUTO-ENTMCNC: 33.7 G/DL (ref 31.5–36.5)
MCV RBC AUTO: 91 FL (ref 78–100)
NT-PROBNP SERPL-MCNC: 768 PG/ML (ref 0–1800)
PLATELET # BLD AUTO: 240 10E3/UL (ref 150–450)
POTASSIUM SERPL-SCNC: 3.9 MMOL/L (ref 3.4–5.3)
RBC # BLD AUTO: 4.45 10E6/UL (ref 3.8–5.2)
SODIUM SERPL-SCNC: 144 MMOL/L (ref 135–145)
WBC # BLD AUTO: 7.8 10E3/UL (ref 4–11)

## 2025-01-21 PROCEDURE — 85027 COMPLETE CBC AUTOMATED: CPT | Mod: ZL

## 2025-01-21 PROCEDURE — 82310 ASSAY OF CALCIUM: CPT | Mod: ZL

## 2025-01-21 PROCEDURE — 80048 BASIC METABOLIC PNL TOTAL CA: CPT | Mod: ZL

## 2025-01-21 PROCEDURE — 83735 ASSAY OF MAGNESIUM: CPT | Mod: ZL

## 2025-01-21 PROCEDURE — 83880 ASSAY OF NATRIURETIC PEPTIDE: CPT | Mod: ZL

## 2025-01-21 PROCEDURE — 36415 COLL VENOUS BLD VENIPUNCTURE: CPT | Mod: ZL

## 2025-02-10 NOTE — PROGRESS NOTES
[facility] HEART CARE   CARDIOLOGY PROGRESS NOTE     Chief Complaint   Patient presents with    Follow Up     ED - medications          Diagnosis:   CHF-idiopathic.  -45-50% on 1/30/24.  -45-50% on 2/26/21.  -35-40% on 3/25/2013 cMRI.  2.  PVC's.   -7.8% on 6/10/2015.   -1.2% on 12/23/2016.   -PVC ablation  on 3/21/2013, [facility].    -H/O Multaq/amiodarone for PVC burden.  3.  Hypertension-controlled.  4.  Hyperlipidemia-controlled.  5.  CKD-3.  6.  CAD.   -Cardiac cath on 1/25/2013-[facility].    -Mild disease, no stent.  7.  AAA.   -3.2 cm on 11/29/2024.   -3.2 cm on 1/14/2023.         Assessment/Plan:   1.  She seen on 1/7/2025.  She was to follow-up in 1 year.  Plan for ultrasound for AAA in 1 year.  Echo at next appointment. CT on 1/9/2025.  Because of heart failure, she was started on Jardiance and Entresto.  She presented to the ER 1/9/2025.  First troponin was slightly elevated at 15 but second troponin increased.  She had presented after waking up with chest discomfort at around 11 PM.  Discomfort was rated 3-4/10.  Her blood pressure was slightly reduced at 99/69.  She was taken off Entresto but continued on Jardiance without recurrence of symptoms.  As result, we will continue off Entresto but continue on Jardiance.  Patient was looking for reassurance today.  2.  Follow-up in the future as planned.    Interval history:  See above.      HPI:    Mrs. Shah is being seen by cardiology to establish care.  She has been following up with [facility].  She is being seen locally as it is closer.  She has a history of systolic heart failure and should PVC induced, PVC's on Multaq and then amiodarone which was stopped on 1/9/2024, and a small AAA at 3.2 cm.     She has a history of frequent PVC's on with tach/amiodarone was discontinued on 1/9/2024, hypokalemia on potassium replacement, hypertension-controlled, CKD-3, hyperlipidemia-controlled, CKD-3, nonocclusive coronary disease on cath from 1/25/2013 with less than 50%  blockages, and concern for carotid artery stenosis.     Hailey has a long history of heart failure.  Her EF was as low as 35-40% on 3/25/2013.  It appears that her heart failure may have been PVC induced.  Records suggest idiopathic.  Most recently, her echocardiogram showed an EF of 50% on 8/3/2022.  She was also noted to have wall motion abnormalities which has been an ongoing finding on previous imaging.  She had a cardiac MRI on 3/25/2013 through [facility].  The MRI showed an EF of 35-40%.  There was mild left ventricular enlargement, mild right ventricular dysfunction, with myocardial fibrosis/fatty replacement in the base to mid-apical anterior and mid myocardial walls.  She was noted to have mild mitral insufficiency.  Cardiac catheterization through [facility] on 1/25/2013 showed 30% proximal LAD, 40% proximal LCx, and 30% mid RCA.  No pulmonary embolus was noted.  She does not have a history of myocardial infarction, NSTEMI, or CABG.  She has not had any chest pain or chest tightness.  She has been mildly dyspneic on exertion walking up stairs or walking at a fast pace.  She has not had significant swelling.  She denied chest tightness or anginal symptoms.  She has been diagnosed with idiopathic cardiomyopathy.     She also has a history of significantly symptomatic PVC's.  He had an ablation on 3/7/2014 for PVC's.  Summary stated very difficult multifocal PVC ablation.  3 dominant epicardial PV sites of origin.  Not all could be completely eliminated but overall PVC burden was reduced.  Greater than 5 additional unrelated PVC's of less frequency observed.  Findings of the studies were consistent with primary nonischemic cardiomyopathy with subsequent multifocal PVC's related to myocardial fibrosis. The PVC ablation appeared to go well, however, subsequent Holter monitor on 3/20/2014 again demonstrated a suboptimal result. 24.2% of all beats were ventricular ectopic beats, totaling 25,076.  PVC ablation to see if  it improves symptoms and improve systolic function.  Previously, was on dronedarone/Multaq on 4/10/2014.  Started on amiodarone 4/27/2015.  Has been on amiodarone until discontinued on 1/9/2024.  It was explained to her at time of consultation that she needs PFT's, chest x-ray, and eye exam yearly on amiodarone.  She needs labs and EKG every 6 to 12 months.  She states she has not had these consistently.  Explained it was time to have these testing done.  Amiodarone was discontinued on 1/9/2024 to see how she does.  It is possible she will need to go back on the medication.     She also has a history of a AAA.  At time of consultation, it was measured 3.2 cm 11/14/2023.  Previously, on 8/26/2022 was 3.2 cm.  We did discuss this abnormality at the time of her consultation.         Relevant testing:  Echo on 1/7/2025    US for AAA, 11/29/2024:  Relatively stable 3.2 cm abdominal aortic aneurysm. For  aneurysms this size recommend ultrasound follow-up in 3 years.    US abdominal aorta on 11/14/2023:  Stable abdominal aortic aneurysm measuring up to 3.2 cm.      US abdominal aorta on 8/26/2022:  Small fusiform aneurysm of the distal abdominal aorta up to 3.2 cm.     Echo on 8/3/2022 at [facility]:   1. Normal LV size, borderline wall thickness, normal global systolic function with an estimated EF of 50%.    2. Mid lateral segment, basal lateral segment, and basal inferior segment are hypokinetic.    3. Right ventricular cavity size is normal, global systolic RV function is normal with a borderline increase in the RVSP estimate.    4. Mildly enlarged left atrium.    5. Mild mitral regurgitation.      Echo on 2/26/2021:  The Ejection Fraction is estimated at 45-50%.  Mild to Moderate left ventricular dilation is present.  Right ventricular function, chamber size, wall motion, and thickness are  normal.  The inferior vena cava is normal.  No pericardial effusion is present.  There is no prior study for direct comparison.      Echo on 8/4/2015:   1. Moderately increased left ventricular size, normal wall thickness, mildly reduced global systolic function, calculated EF of 47 %.   2. Inferior wall, posterior wall, mid lateral segment, and basal lateral segment are abnormal.   3. Mildly enlarged left atrium.   4. The mitral valve is sclerotic, mild to moderate mitral regurgitation, eccentric jet directed posterolaterally. Etiology is like ischemic.   5. Compared to the previous echo images from 4/8/2015, the wall motion abnormalities, ejection fraction, and mitral regurgitation appear unchanged.     Ablation at [facility] on 3/7/2014:1.   Very difficult multifocal PVC ablation   ? 3 dominant epicardial PVC sites of origin.  Not all could be completely eliminated, but overall PVC burden was reduced.   ? >5 additional unrelated PVC sites of less frequency observed   ? Findings from the study are consistent with primary nonischemic cardiomyopathy with subsequent multifocal PVC's related to myocardial fibrosis      Cardiac MRI on 3/25/2013 at [facility]:  1. Idiopathic nonischemic cardiomyopathy.    A. Estimated ejection fraction 35-40% (ejection fraction is difficult to   assess because of very frequent ectopy and real time imaging).    B. Mild left ventricular enlargement.   2. Mild right ventricular dysfunction.   3. Diffuse myocardial fibrosis and fatty replacement in the base to   mid-apical anterior wall-mid myocardial.    A. Non-coronary in distribution.   4. Normal aortic valve.   5. Mild mitral insufficiency.   6. Normal thickening and abdominal aorta and branch vessels.   7. No pulmonary emboli by pulmonary MRA.      Cardiac cath on 1/25/2013 at [facility]:    The LMCA has mild luminal irregularities and is free of   significant disease.     30% stenosis in the Proximal LAD     40% stenosis in the Proximal Circumflex     30% stenosis in the Mid RCA.  Right dominant.     Left ventricular ejection fraction visually estimated at   35-40%. LV  Pressure = 128/6.         ICD-10-CM    1. Chronic systolic congestive heart failure (H)  I50.22       2. Essential hypertension  I10       3. Idiopathic cardiomyopathy (H)  I42.9       4. Non-occlusive coronary artery disease  I25.10       5. History of cardiac cath on 2013-[facility]  Z98.890       6. Family history of ischemic heart disease  Z82.49             Past Medical History:   Diagnosis Date    Chronic diastolic heart failure (H)     2014,ECHO 10/30/2013 - Final Impressions:  1. Mild left ventricular enlargement with a mild decrease in systolic function, estimated ejection fraction 45%.  2. Mild right ventricular enlargement with normal systolic function.  3. Mild left atrial enlargement.  4. Mild mitral regurgitation.  5. Trace tricuspid regurgitation.  6. Mild left ventricular diastolic dysfunction.  7. When compared t*    Chronic kidney disease, stage III (moderate) (H)     2014    Essential (primary) hypertension     No Comments Provided    Hyperlipidemia     No Comments Provided    Low back pain     Chronic low back pain secondary to injury, , with chronic pain syndrome.    Other cardiomyopathies (CODE)     2013,Diagnosed by angio and cath study 2013 at [facility]. EF of 35% at first study    Personal history of other medical treatment (CODE)      with all vaginal deliveries    Raynaud's syndrome without gangrene     No Comments Provided    Tinnitus     No Comments Provided    Ventricular premature depolarization     3/25/2013    Ventricular tachycardia (H)     3/4/2014       Past Surgical History:   Procedure Laterality Date    ARTHROSCOPY SHOULDER Right     Shoulder surgery for impingement    ARTHROSCOPY SHOULDER Left      Left shoulder surgery for impingement    ARTHROSCOPY SHOULDER  2009    and debridement, distal clavical excision    Cardiac ablation for pvc      not successful    COLONOSCOPY      06,Inflammation noted, no further issues    COLONOSCOPY   "07/28/2016 7/28/2016,wnl no need f/u    DILATION AND CURETTAGE      D&C for retained placentas x2    FINGER SURGERY Left 10/1989    fourth finger, cyst removal    HYSTERECTOMY TOTAL ABDOMINAL, BILATERAL SALPINGO-OOPHORECTOMY, COMBINED      1991,Soren-Clifton procedure    MAMMOPLASTY AUGMENTATION      1983    REPAIR BLADDER      7/1999, with [facility] and Juan Miguel procedure with cystoscopy       Allergies   Allergen Reactions    Codeine GI Disturbance     \"gets sick\" per patient     Morphine Nausea     \"gets sick\" per patient     Sulfa Antibiotics Unknown     Other reaction(s): *Unknown - Childhood Rxn per her mother       Current Outpatient Medications   Medication Sig Dispense Refill    ascorbic acid (VITAMIN C) 500 MG tablet Take 500 mg by mouth daily       aspirin 81 MG EC tablet Take 1 tablet (81 mg) by mouth daily. 90 tablet 3    Calcium Carbonate-Vitamin D 500-125 MG-UNIT TABS Take 1 tablet by mouth 2 times daily       doxepin (SILENOR) 6 MG tablet TAKE 1 TABLET (6MG) BY MOUTH EVERY NIGHT AS NEEDEDFOR SLEEP 90 tablet 0    empagliflozin (JARDIANCE) 10 MG TABS tablet Take 1 tablet (10 mg) by mouth daily. 90 tablet 3    fish oil-omega-3 fatty acids 1000 MG capsule Take 2 g by mouth 2 times daily      furosemide (LASIX) 20 MG tablet Take 1 tablet (20 mg) by mouth daily. 90 tablet 3    levothyroxine (SYNTHROID/LEVOTHROID) 25 MCG tablet TAKE 1 TABLET (25 MCG) BY MOUTH EVERY DAY 90 tablet 0    metoprolol succinate ER (TOPROL XL) 50 MG 24 hr tablet Take 1 tablet (50 mg) by mouth daily. 90 tablet 3    Multiple Vitamin (MULTI-VITAMINS) TABS Take 1 tablet by mouth      rosuvastatin (CRESTOR) 40 MG tablet TAKE 1 TABLET BY MOUTH NIGHTLY AT BEDTIME 90 tablet 3    spironolactone (ALDACTONE) 25 MG tablet Take 1 tablet (25 mg) by mouth daily. 90 tablet 3    UNABLE TO FIND MEDICATION NAME: OSTEO BIFLEX 1 tablet daily         Social History     Socioeconomic History    Marital status:      Spouse name: Not on file    Number " of children: Not on file    Years of education: Not on file    Highest education level: Not on file   Occupational History    Not on file   Tobacco Use    Smoking status: Never    Smokeless tobacco: Never   Vaping Use    Vaping status: Never Used   Substance and Sexual Activity    Alcohol use: No     Alcohol/week: 0.0 standard drinks of alcohol    Drug use: Never    Sexual activity: Yes     Partners: Male   Other Topics Concern    Not on file   Social History Narrative    . Gerardo - .  Retired from [employer] as a -retired 1/18/2012.  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    Children: Fariba Umana, 1963, Ravindra, [place], Shreya Esquivel, 1965, Dominic Dwyer, 1967, Fouzia Dwyer, 1968, Miles     Social Drivers of Health     Financial Resource Strain: Low Risk  (11/1/2024)    Financial Resource Strain     Within the past 12 months, have you or your family members you live with been unable to get utilities (heat, electricity) when it was really needed?: No   Food Insecurity: Low Risk  (11/1/2024)    Food Insecurity     Within the past 12 months, did you worry that your food would run out before you got money to buy more?: No     Within the past 12 months, did the food you bought just not last and you didn t have money to get more?: No   Transportation Needs: Low Risk  (11/1/2024)    Transportation Needs     Within the past 12 months, has lack of transportation kept you from medical appointments, getting your medicines, non-medical meetings or appointments, work, or from getting things that you need?: No   Physical Activity: Insufficiently Active (11/1/2024)    Exercise Vital Sign     Days of Exercise per Week: 3 days     Minutes of Exercise per Session: 20 min   Stress: No Stress Concern Present (11/1/2024)    [employer] [place] [employer] - Occupational Stress Questionnaire     Feeling of Stress : Only a little   Social Connections: Unknown  (11/1/2024)    Social Connection and Isolation Panel [NHANES]     Frequency of Communication with Friends and Family: Not on file     Frequency of Social Gatherings with Friends and Family: Twice a week     Attends Hindu Services: Not on file     Active Member of Clubs or Organizations: Not on file     Attends Club or Organization Meetings: Not on file     Marital Status: Not on file   Interpersonal Safety: Low Risk  (2/11/2025)    Interpersonal Safety     Do you feel physically and emotionally safe where you currently live?: Yes     Within the past 12 months, have you been hit, slapped, kicked or otherwise physically hurt by someone?: No     Within the past 12 months, have you been humiliated or emotionally abused in other ways by your partner or ex-partner?: No   Housing Stability: Low Risk  (11/1/2024)    Housing Stability     Do you have housing? : Yes     Are you worried about losing your housing?: No       LAB RESULTS:   No visits with results within 2 Month(s) from this visit.   Latest known visit with results is:   Office Visit on 11/01/2024   Component Date Value Ref Range Status    Sodium 11/01/2024 139  135 - 145 mmol/L Final    Potassium 11/01/2024 4.2  3.4 - 5.3 mmol/L Final    Carbon Dioxide (CO2) 11/01/2024 27  22 - 29 mmol/L Final    Anion Gap 11/01/2024 10  7 - 15 mmol/L Final    Urea Nitrogen 11/01/2024 17.2  8.0 - 23.0 mg/dL Final    Creatinine 11/01/2024 1.22 (H)  0.51 - 0.95 mg/dL Final    GFR Estimate 11/01/2024 45 (L)  >60 mL/min/1.73m2 Final    Calcium 11/01/2024 9.6  8.8 - 10.4 mg/dL Final    Chloride 11/01/2024 102  98 - 107 mmol/L Final    Glucose 11/01/2024 92  70 - 99 mg/dL Final    Alkaline Phosphatase 11/01/2024 50  40 - 150 U/L Final    AST 11/01/2024 30  0 - 45 U/L Final    ALT 11/01/2024 22  0 - 50 U/L Final    Protein Total 11/01/2024 7.6  6.4 - 8.3 g/dL Final    Albumin 11/01/2024 4.5  3.5 - 5.2 g/dL Final    Bilirubin Total 11/01/2024 0.6  <=1.2 mg/dL Final    Patient Fasting  "> 8hrs? 11/01/2024 Unknown   Final    Cholesterol 11/01/2024 150  <200 mg/dL Final    Triglycerides 11/01/2024 88  <150 mg/dL Final    Direct Measure HDL 11/01/2024 71  >=50 mg/dL Final    LDL Cholesterol Calculated 11/01/2024 61  <100 mg/dL Final    Non HDL Cholesterol 11/01/2024 79  <130 mg/dL Final    Patient Fasting > 8hrs? 11/01/2024 Unknown   Final    Creatinine Urine mg/dL 11/01/2024 12.7  mg/dL Final    Albumin Urine mg/L 11/01/2024 <12.0  mg/L Final    Albumin Urine mg/g Cr 11/01/2024    Final    WBC Count 11/01/2024 6.2  4.0 - 11.0 10e3/uL Final    RBC Count 11/01/2024 4.45  3.80 - 5.20 10e6/uL Final    Hemoglobin 11/01/2024 13.8  11.7 - 15.7 g/dL Final    Hematocrit 11/01/2024 41.3  35.0 - 47.0 % Final    MCV 11/01/2024 93  78 - 100 fL Final    MCH 11/01/2024 31.0  26.5 - 33.0 pg Final    MCHC 11/01/2024 33.4  31.5 - 36.5 g/dL Final    RDW 11/01/2024 13.7  10.0 - 15.0 % Final    Platelet Count 11/01/2024 205  150 - 450 10e3/uL Final    TSH 11/01/2024 2.71  0.30 - 4.20 uIU/mL Final        Review of systems: Negative except that which was noted in the HPI.    Physical examination:  /68 (BP Location: Right arm, Patient Position: Sitting, Cuff Size: Adult Large)   Pulse 80   Temp 98.4  F (36.9  C) (Temporal)   Resp 16   Ht 1.6 m (5' 2.99\")   Wt 81.2 kg (179 lb)   LMP  (LMP Unknown)   SpO2 96%   BMI 31.72 kg/m      GENERAL APPEARANCE: healthy, alert and no distress  CHEST: lungs clear to auscultation - no rales, rhonchi or wheezes, no use of accessory muscles, no retractions, respirations are unlabored, normal respiratory rate  CARDIOVASCULAR: regular rhythm, normal S1 with physiologic split S2, no S3 or S4 and no murmur, click or rub  EXTREMITIES: no clubbing, cyanosis or edema            Thank you for allowing me to participate in the care of your patient. Please do not hesitate to contact me if you have any questions.     Jean Beltran, DO                "

## 2025-02-11 ENCOUNTER — OFFICE VISIT (OUTPATIENT)
Dept: FAMILY MEDICINE | Facility: OTHER | Age: 80
End: 2025-02-11
Attending: INTERNAL MEDICINE
Payer: COMMERCIAL

## 2025-02-11 ENCOUNTER — OFFICE VISIT (OUTPATIENT)
Dept: CARDIOLOGY | Facility: OTHER | Age: 80
End: 2025-02-11
Attending: INTERNAL MEDICINE
Payer: COMMERCIAL

## 2025-02-11 VITALS
HEART RATE: 61 BPM | SYSTOLIC BLOOD PRESSURE: 114 MMHG | TEMPERATURE: 98.3 F | RESPIRATION RATE: 16 BRPM | DIASTOLIC BLOOD PRESSURE: 70 MMHG | BODY MASS INDEX: 31.93 KG/M2 | WEIGHT: 180.2 LBS | OXYGEN SATURATION: 97 %

## 2025-02-11 VITALS
OXYGEN SATURATION: 96 % | BODY MASS INDEX: 31.71 KG/M2 | HEIGHT: 63 IN | TEMPERATURE: 98.4 F | SYSTOLIC BLOOD PRESSURE: 108 MMHG | WEIGHT: 179 LBS | RESPIRATION RATE: 16 BRPM | DIASTOLIC BLOOD PRESSURE: 68 MMHG | HEART RATE: 80 BPM

## 2025-02-11 DIAGNOSIS — H60.391 INFECTIVE OTITIS EXTERNA, RIGHT: Primary | ICD-10-CM

## 2025-02-11 DIAGNOSIS — N18.32 STAGE 3B CHRONIC KIDNEY DISEASE (H): ICD-10-CM

## 2025-02-11 DIAGNOSIS — I10 ESSENTIAL HYPERTENSION: ICD-10-CM

## 2025-02-11 DIAGNOSIS — Z98.890 HISTORY OF CARDIAC CATH: ICD-10-CM

## 2025-02-11 DIAGNOSIS — I42.9 IDIOPATHIC CARDIOMYOPATHY (H): ICD-10-CM

## 2025-02-11 DIAGNOSIS — I25.10 NON-OCCLUSIVE CORONARY ARTERY DISEASE: ICD-10-CM

## 2025-02-11 DIAGNOSIS — Z82.49 FAMILY HISTORY OF ISCHEMIC HEART DISEASE: ICD-10-CM

## 2025-02-11 DIAGNOSIS — I50.22 CHRONIC SYSTOLIC CONGESTIVE HEART FAILURE (H): Primary | ICD-10-CM

## 2025-02-11 PROCEDURE — G0463 HOSPITAL OUTPT CLINIC VISIT: HCPCS

## 2025-02-11 PROCEDURE — G0463 HOSPITAL OUTPT CLINIC VISIT: HCPCS | Mod: 27

## 2025-02-11 RX ORDER — OFLOXACIN 3 MG/ML
10 SOLUTION AURICULAR (OTIC) DAILY
Qty: 5 ML | Refills: 1 | Status: SHIPPED | OUTPATIENT
Start: 2025-02-11 | End: 2025-02-18

## 2025-02-11 ASSESSMENT — PAIN SCALES - GENERAL
PAINLEVEL_OUTOF10: MILD PAIN (2)
PAINLEVEL_OUTOF10: NO PAIN (0)

## 2025-02-11 NOTE — NURSING NOTE
Chief Complaint   Patient presents with    Otalgia         Medication Reconciliation: complete    Josselin Bhakta, LPN

## 2025-02-11 NOTE — NURSING NOTE
"Chief Complaint   Patient presents with    Follow Up     ED - medications       Initial /68 (BP Location: Right arm, Patient Position: Sitting, Cuff Size: Adult Large)   Pulse 80   Temp 98.4  F (36.9  C) (Temporal)   Resp 16   Ht 1.6 m (5' 2.99\")   Wt 81.2 kg (179 lb)   LMP  (LMP Unknown)   SpO2 96%   BMI 31.72 kg/m   Estimated body mass index is 31.72 kg/m  as calculated from the following:    Height as of this encounter: 1.6 m (5' 2.99\").    Weight as of this encounter: 81.2 kg (179 lb).  Meds Reconciled: complete  Pt is on Aspirin  Pt is on a Statin  PHQ and/or SANGEETA reviewed. Pt referred to PCP/MH Provider as appropriate.    Viviane Tang LPN      "

## 2025-02-11 NOTE — PROGRESS NOTES
Nursing Notes:   CindyJosselin pederson, LPN  2/11/2025  4:08 PM  Sign at exiting of workspace  Chief Complaint   Patient presents with    Otalgia         Medication Reconciliation: complete    Josselin ALONSO. Anick, LPN      Subjective   Hailey is a 79 year old, presenting for the following health issues:  Otalgia        2/11/2025     4:07 PM   Additional Questions   Roomed by Josselin Bhakta     Hailey is here today for a complaint of ear pain.  Right ear hurts.  Has been bothering her for about a week.  No recent upper respiratory infection symptoms.  No fever or cough.  No water in her ear recently.  She does wear hearing aids.    History of Present Illness       Reason for visit:  Ear ache  Symptom onset:  3-7 days ago  Symptoms include:  Pain in ear  Symptom intensity:  Mild  Symptom progression:  Worsening  Had these symptoms before:  No   She is taking medications regularly.         Rt ear pain          Review of Systems  Constitutional, HEENT, cardiovascular, pulmonary, GI, , musculoskeletal, neuro, skin, endocrine and psych systems are negative, except as otherwise noted.      Objective    /70   Pulse 61   Temp 98.3  F (36.8  C) (Tympanic)   Resp 16   Wt 81.7 kg (180 lb 3.2 oz)   LMP  (LMP Unknown)   SpO2 97%   Breastfeeding No   BMI 31.93 kg/m    Body mass index is 31.93 kg/m .  Physical Exam  Constitutional:       Appearance: Normal appearance.   HENT:      Head: Normocephalic.      Right Ear: There is no impacted cerumen.      Left Ear: Tympanic membrane, ear canal and external ear normal. There is no impacted cerumen.      Ears:      Comments: Her right canal is swollen and red.  TM is retracted without redness.  Canal is tender as well.     Nose: Nose normal. No congestion or rhinorrhea.      Mouth/Throat:      Mouth: Mucous membranes are moist.      Pharynx: Oropharynx is clear. No oropharyngeal exudate or posterior oropharyngeal erythema.   Eyes:      Extraocular Movements: Extraocular movements  intact.      Pupils: Pupils are equal, round, and reactive to light.   Cardiovascular:      Rate and Rhythm: Normal rate and regular rhythm.      Heart sounds: Normal heart sounds. No murmur heard.  Pulmonary:      Effort: Pulmonary effort is normal.      Breath sounds: Normal breath sounds. No wheezing, rhonchi or rales.   Musculoskeletal:      Cervical back: Normal range of motion and neck supple.   Lymphadenopathy:      Cervical: No cervical adenopathy.   Neurological:      Mental Status: She is alert.   Psychiatric:         Mood and Affect: Mood normal.         Behavior: Behavior normal.              ICD-10-CM    1. Infective otitis externa, right  H60.391 ofloxacin (FLOXIN) 0.3 % otic solution      2. Stage 3b chronic kidney disease (H)  N18.32           Treat with ofloxacin otic drops.  She does wear hearing aids and recommended that she leave her hearing aid out for a week to allow of adequate treatment of this ear infection.  If not improving, follow-up.  She had labs done last month which showed stable renal function.    No follow-ups on file.     The longitudinal plan of care for the diagnosis(es)/condition(s) as documented were addressed during this visit. Due to the added complexity in care, I will continue to support Hailey in the subsequent management and with ongoing continuity of care.      Roxana Maki MD

## 2025-04-09 ENCOUNTER — HOSPITAL ENCOUNTER (OUTPATIENT)
Dept: CARDIOLOGY | Facility: OTHER | Age: 80
Discharge: HOME | End: 2025-04-09
Attending: INTERNAL MEDICINE
Payer: COMMERCIAL

## 2025-04-09 DIAGNOSIS — I42.9 IDIOPATHIC CARDIOMYOPATHY (H): ICD-10-CM

## 2025-04-09 DIAGNOSIS — I50.22 CHRONIC SYSTOLIC CONGESTIVE HEART FAILURE (H): ICD-10-CM

## 2025-04-09 LAB — LVEF ECHO: NORMAL

## 2025-04-09 PROCEDURE — 93306 TTE W/DOPPLER COMPLETE: CPT

## 2025-08-22 ENCOUNTER — HOSPITAL ENCOUNTER (OUTPATIENT)
Dept: MAMMOGRAPHY | Facility: OTHER | Age: 80
Discharge: HOME | End: 2025-08-22
Attending: FAMILY MEDICINE | Admitting: FAMILY MEDICINE
Payer: COMMERCIAL

## 2025-08-22 DIAGNOSIS — Z12.31 VISIT FOR SCREENING MAMMOGRAM: ICD-10-CM

## 2025-08-22 PROCEDURE — 77067 SCR MAMMO BI INCL CAD: CPT

## 2025-08-22 PROCEDURE — 77067 SCR MAMMO BI INCL CAD: CPT | Mod: 26 | Performed by: STUDENT IN AN ORGANIZED HEALTH CARE EDUCATION/TRAINING PROGRAM

## 2025-08-22 PROCEDURE — 77063 BREAST TOMOSYNTHESIS BI: CPT | Mod: 26 | Performed by: STUDENT IN AN ORGANIZED HEALTH CARE EDUCATION/TRAINING PROGRAM

## (undated) RX ORDER — BUPIVACAINE HYDROCHLORIDE 5 MG/ML
INJECTION, SOLUTION EPIDURAL; INTRACAUDAL
Status: DISPENSED
Start: 2021-10-26

## (undated) RX ORDER — ACETAMINOPHEN 500 MG
TABLET ORAL
Status: DISPENSED
Start: 2020-07-22

## (undated) RX ORDER — ONDANSETRON 4 MG/1
TABLET, ORALLY DISINTEGRATING ORAL
Status: DISPENSED
Start: 2020-10-15

## (undated) RX ORDER — IBUPROFEN 400 MG/1
TABLET, FILM COATED ORAL
Status: DISPENSED
Start: 2020-10-15

## (undated) RX ORDER — HYDROCODONE BITARTRATE AND ACETAMINOPHEN 5; 325 MG/1; MG/1
TABLET ORAL
Status: DISPENSED
Start: 2020-10-15

## (undated) RX ORDER — HYDROCODONE BITARTRATE AND ACETAMINOPHEN 5; 325 MG/1; MG/1
TABLET ORAL
Status: DISPENSED
Start: 2020-10-11

## (undated) RX ORDER — LEVOFLOXACIN 5 MG/ML
INJECTION, SOLUTION INTRAVENOUS
Status: DISPENSED
Start: 2020-11-17

## (undated) RX ORDER — AZITHROMYCIN 250 MG/1
TABLET, FILM COATED ORAL
Status: DISPENSED
Start: 2020-10-15

## (undated) RX ORDER — PROCHLORPERAZINE MALEATE 5 MG
TABLET ORAL
Status: DISPENSED
Start: 2020-10-15

## (undated) RX ORDER — SODIUM CHLORIDE 9 MG/ML
INJECTION, SOLUTION INTRAVENOUS
Status: DISPENSED
Start: 2020-11-17

## (undated) RX ORDER — LIDOCAINE HYDROCHLORIDE 10 MG/ML
INJECTION, SOLUTION INFILTRATION; PERINEURAL
Status: DISPENSED
Start: 2021-10-26

## (undated) RX ORDER — TRIAMCINOLONE ACETONIDE 40 MG/ML
INJECTION, SUSPENSION INTRA-ARTICULAR; INTRAMUSCULAR
Status: DISPENSED
Start: 2021-10-26

## (undated) RX ORDER — FENTANYL CITRATE 50 UG/ML
INJECTION, SOLUTION INTRAMUSCULAR; INTRAVENOUS
Status: DISPENSED
Start: 2020-10-11

## (undated) RX ORDER — POTASSIUM CHLORIDE 7.45 MG/ML
INJECTION INTRAVENOUS
Status: DISPENSED
Start: 2020-11-17

## (undated) RX ORDER — POTASSIUM CHLORIDE 7.45 MG/ML
INJECTION INTRAVENOUS
Status: DISPENSED
Start: 2020-11-18